# Patient Record
Sex: FEMALE | Race: WHITE | NOT HISPANIC OR LATINO | Employment: FULL TIME | URBAN - METROPOLITAN AREA
[De-identification: names, ages, dates, MRNs, and addresses within clinical notes are randomized per-mention and may not be internally consistent; named-entity substitution may affect disease eponyms.]

---

## 2023-09-28 DIAGNOSIS — M54.50 ACUTE BILATERAL LOW BACK PAIN, UNSPECIFIED WHETHER SCIATICA PRESENT: Primary | ICD-10-CM

## 2023-10-02 DIAGNOSIS — M54.50 ACUTE BILATERAL LOW BACK PAIN: Primary | ICD-10-CM

## 2023-10-04 PROBLEM — M25.552 LEFT HIP PAIN: Status: ACTIVE | Noted: 2019-10-09

## 2023-10-04 PROBLEM — I10 ESSENTIAL (PRIMARY) HYPERTENSION: Status: ACTIVE | Noted: 2018-07-30

## 2023-10-04 PROBLEM — S76.012A STRAIN OF LEFT HIP: Status: ACTIVE | Noted: 2019-10-09

## 2023-10-04 PROBLEM — N95.1 PERIMENOPAUSE: Status: ACTIVE | Noted: 2023-10-04

## 2023-10-04 PROBLEM — N91.4 SECONDARY OLIGOMENORRHEA: Status: ACTIVE | Noted: 2023-10-04

## 2023-10-04 PROBLEM — K21.9 GASTRO-ESOPHAGEAL REFLUX DISEASE WITHOUT ESOPHAGITIS: Status: ACTIVE | Noted: 2019-02-20

## 2023-10-04 PROBLEM — I20.9 CARDIAC ANGINA (CMS-HCC): Status: ACTIVE | Noted: 2023-10-04

## 2023-10-04 PROBLEM — E66.9 OBESITY: Status: ACTIVE | Noted: 2023-10-04

## 2023-10-04 PROBLEM — I71.9 AORTIC ANEURYSM (CMS-HCC): Status: ACTIVE | Noted: 2023-05-01

## 2023-10-04 PROBLEM — J40 BRONCHITIS: Status: ACTIVE | Noted: 2023-06-13

## 2023-10-04 PROBLEM — L23.7 POISON IVY DERMATITIS: Status: ACTIVE | Noted: 2021-08-18

## 2023-10-04 PROBLEM — N95.1 FEMALE CLIMACTERIC STATE: Status: ACTIVE | Noted: 2023-10-04

## 2023-10-04 PROBLEM — D64.9 ANEMIA: Status: ACTIVE | Noted: 2020-08-07

## 2023-10-04 PROBLEM — E78.00 PURE HYPERCHOLESTEROLEMIA: Status: ACTIVE | Noted: 2019-02-20

## 2023-10-04 PROBLEM — R93.1 ELEVATED CORONARY ARTERY CALCIUM SCORE: Status: ACTIVE | Noted: 2019-02-26

## 2023-10-04 RX ORDER — BENZONATATE 100 MG/1
100 CAPSULE ORAL 3 TIMES DAILY
COMMUNITY
Start: 2023-06-13 | End: 2023-11-13 | Stop reason: ALTCHOICE

## 2023-10-04 RX ORDER — ASPIRIN 81 MG/1
1 TABLET ORAL DAILY
COMMUNITY
Start: 2019-03-25 | End: 2023-11-13

## 2023-10-04 RX ORDER — OMEPRAZOLE 20 MG/1
20 CAPSULE, DELAYED RELEASE ORAL EVERY OTHER DAY
COMMUNITY
Start: 2019-03-25

## 2023-10-04 RX ORDER — LOSARTAN POTASSIUM 50 MG/1
50 TABLET ORAL DAILY
COMMUNITY
Start: 2023-05-16 | End: 2023-11-13 | Stop reason: ALTCHOICE

## 2023-10-04 RX ORDER — CHOLECALCIFEROL (VITAMIN D3) 50 MCG
1 TABLET ORAL DAILY
COMMUNITY
End: 2023-11-13

## 2023-10-04 RX ORDER — PENICILLIN V POTASSIUM 500 MG/1
TABLET, FILM COATED ORAL
COMMUNITY
Start: 2023-03-23

## 2023-10-04 RX ORDER — ALBUTEROL SULFATE 90 UG/1
2 AEROSOL, METERED RESPIRATORY (INHALATION) EVERY 4 HOURS PRN
COMMUNITY
Start: 2023-07-05 | End: 2024-05-13 | Stop reason: ALTCHOICE

## 2023-10-04 RX ORDER — LISINOPRIL 10 MG/1
10 TABLET ORAL DAILY
COMMUNITY
Start: 2019-03-25 | End: 2023-11-13 | Stop reason: ALTCHOICE

## 2023-10-04 RX ORDER — VALACYCLOVIR HYDROCHLORIDE 1 G/1
2 TABLET, FILM COATED ORAL DAILY PRN
COMMUNITY
End: 2024-04-16 | Stop reason: ALTCHOICE

## 2023-10-04 RX ORDER — DOXYCYCLINE HYCLATE 100 MG
100 TABLET ORAL DAILY
COMMUNITY
Start: 2023-06-19 | End: 2023-11-13 | Stop reason: ALTCHOICE

## 2023-10-04 RX ORDER — AMLODIPINE BESYLATE 5 MG/1
5 TABLET ORAL DAILY
COMMUNITY

## 2023-10-04 RX ORDER — EMOLLIENT COMBINATION NO.32
EMULSION, EXTENDED RELEASE TOPICAL
COMMUNITY
Start: 2023-08-15

## 2023-10-04 RX ORDER — IBUPROFEN 100 MG/5ML
1000 SUSPENSION, ORAL (FINAL DOSE FORM) ORAL DAILY
COMMUNITY

## 2023-10-04 RX ORDER — CHOLECALCIFEROL (VITAMIN D3) 125 MCG
CAPSULE ORAL
COMMUNITY
Start: 2019-03-25

## 2023-10-05 ENCOUNTER — TREATMENT (OUTPATIENT)
Dept: PHYSICAL THERAPY | Facility: CLINIC | Age: 58
End: 2023-10-05
Payer: COMMERCIAL

## 2023-10-05 DIAGNOSIS — M54.50 ACUTE BILATERAL LOW BACK PAIN, UNSPECIFIED WHETHER SCIATICA PRESENT: ICD-10-CM

## 2023-10-05 PROCEDURE — 97012 MECHANICAL TRACTION THERAPY: CPT | Mod: GP

## 2023-10-05 PROCEDURE — 97112 NEUROMUSCULAR REEDUCATION: CPT | Mod: GP

## 2023-10-05 PROCEDURE — 97014 ELECTRIC STIMULATION THERAPY: CPT | Mod: GP

## 2023-10-05 NOTE — PROGRESS NOTES
Physical Therapy Treatment    Patient Name: Tiki Gunter  MRN: [id number]  Today's Date: 10/5/2023  Time Calculation  Start Time: 1520  Stop Time: 1610  Time Calculation (min): 50 min    Current Problem   1. Acute bilateral low back pain, unspecified whether sciatica present  PT eval and treat          Subjective   General   General Comment: Cx last week due to illness. Was doing better until then, some symptoms still better but today having intense stiffness.  Pain   Pain Assessment:  (moderate stiffness lower LS region)    Objective   Findings:   R anterior innominate, FW bent with gait, L trunk lean, slow pete    Treatments:  Mechanical traction: Static: 85 lbs, for 12 minutes in Prone  Dry needling following informed consent: with e-stim; 5Hz, mA to tolerance, applied to R/L LPS and gluteals, for 10 minutes.    MET R anterior innominate, neutral pelvis achieved x8min    Assessment:   PT Assessment  Assessment Comment: More erect posture and fluid gait after tx    Plan:   OP PT Plan  Treatment/Interventions: Dry needling, Education/ Instruction, Electrical stimulation, Hot pack, Manual therapy, Mechanical traction, Neuromuscular re-education  PT Plan: Skilled PT  PT Frequency:  (1-2xwk)  Duration: 8visits  Number of Treatments Authorized: MN  Rehab Potential: Good  Plan of Care Agreement: Patient

## 2023-10-24 ENCOUNTER — TREATMENT (OUTPATIENT)
Dept: PHYSICAL THERAPY | Facility: CLINIC | Age: 58
End: 2023-10-24
Payer: COMMERCIAL

## 2023-10-24 DIAGNOSIS — M54.40 ACUTE BILATERAL LOW BACK PAIN WITH SCIATICA, SCIATICA LATERALITY UNSPECIFIED: Primary | ICD-10-CM

## 2023-10-24 PROCEDURE — 97112 NEUROMUSCULAR REEDUCATION: CPT | Mod: GP

## 2023-10-24 PROCEDURE — 97012 MECHANICAL TRACTION THERAPY: CPT | Mod: GP

## 2023-10-24 PROCEDURE — 97014 ELECTRIC STIMULATION THERAPY: CPT | Mod: GP

## 2023-10-24 NOTE — PROGRESS NOTES
Physical Therapy Treatment    Patient Name: Tiki Gunter  MRN: [id number]  Today's Date: 10/24/2023  Time Calculation  Start Time: 1140  Stop Time: 1240  Time Calculation (min): 60 min    Current Problem   1. Acute bilateral low back pain with sciatica, sciatica laterality unspecified              Subjective   General    Has been compliant with HEP but still in a lot of pain. Did have an hour drive to a wedding but was mostly seated, avoided dancing, and was not helping at this wedding like past one. Had exacerbation of pain that has since improved but still not finding relief last besides earlier in course of PT relief from burning has remained better. Feeling better after PT sessions is short-lived. Hard to tell if her back is causing her to move differently or her hip issues agitate her back. Has found that hooklying she can bear her hands into her thighs and get her back to pop. That gives relief. Still plans to look into inversion for home.  Pain    Severe recently, moderate today.    Objective   Neutral pelvis  Asymmetrical standing, FW bent    Treatments:  Mechanical traction: Static: 85 lbs, for 12 minutes in Prone  Dry needling following informed consent: with e-stim; 5Hz, mA to tolerance, applied to R/L LPS and gluteals, for 10 minutes.    Verbal review MET options, continues with alternating technique due to uncertainty with direction. Educated pt re potential benefit of change to aquatic PT.    Assessment:    Initial progress in relieving burning symptoms but more recently not retaining relief achieved at visits for other symptoms. Pt has f/u with primary in 2 wks. Anticipates referral to ortho or pain management.    Plan:    May benefit from aquatic PT. Will await physician follow up.

## 2023-10-30 ENCOUNTER — APPOINTMENT (OUTPATIENT)
Dept: PHYSICAL THERAPY | Facility: CLINIC | Age: 58
End: 2023-10-30
Payer: COMMERCIAL

## 2023-11-06 ENCOUNTER — APPOINTMENT (OUTPATIENT)
Dept: PHYSICAL THERAPY | Facility: CLINIC | Age: 58
End: 2023-11-06
Payer: COMMERCIAL

## 2023-11-09 ENCOUNTER — LAB (OUTPATIENT)
Dept: LAB | Facility: LAB | Age: 58
End: 2023-11-09
Payer: COMMERCIAL

## 2023-11-09 DIAGNOSIS — Z01.84 ENCOUNTER FOR ANTIBODY RESPONSE EXAMINATION: Primary | ICD-10-CM

## 2023-11-09 DIAGNOSIS — E78.00 PURE HYPERCHOLESTEROLEMIA, UNSPECIFIED: ICD-10-CM

## 2023-11-09 DIAGNOSIS — Z00.00 ENCOUNTER FOR GENERAL ADULT MEDICAL EXAMINATION WITHOUT ABNORMAL FINDINGS: ICD-10-CM

## 2023-11-09 DIAGNOSIS — I10 ESSENTIAL (PRIMARY) HYPERTENSION: ICD-10-CM

## 2023-11-09 LAB
ALBUMIN SERPL BCP-MCNC: 4.4 G/DL (ref 3.4–5)
ALP SERPL-CCNC: 81 U/L (ref 33–110)
ALT SERPL W P-5'-P-CCNC: 10 U/L (ref 7–45)
ANION GAP SERPL CALC-SCNC: 12 MMOL/L (ref 10–20)
APPEARANCE UR: ABNORMAL
AST SERPL W P-5'-P-CCNC: 18 U/L (ref 9–39)
BASOPHILS # BLD AUTO: 0.07 X10*3/UL (ref 0–0.1)
BASOPHILS NFR BLD AUTO: 1.5 %
BILIRUB SERPL-MCNC: 0.6 MG/DL (ref 0–1.2)
BILIRUB UR STRIP.AUTO-MCNC: NEGATIVE MG/DL
BUN SERPL-MCNC: 13 MG/DL (ref 6–23)
CALCIUM SERPL-MCNC: 9.7 MG/DL (ref 8.6–10.3)
CAOX CRY #/AREA UR COMP ASSIST: ABNORMAL /HPF
CHLORIDE SERPL-SCNC: 102 MMOL/L (ref 98–107)
CHOLEST SERPL-MCNC: 228 MG/DL (ref 0–199)
CHOLESTEROL/HDL RATIO: 3.6
CO2 SERPL-SCNC: 30 MMOL/L (ref 21–32)
COLOR UR: YELLOW
CREAT SERPL-MCNC: 0.73 MG/DL (ref 0.5–1.05)
EOSINOPHIL # BLD AUTO: 0.06 X10*3/UL (ref 0–0.7)
EOSINOPHIL NFR BLD AUTO: 1.3 %
ERYTHROCYTE [DISTWIDTH] IN BLOOD BY AUTOMATED COUNT: 15.8 % (ref 11.5–14.5)
GFR SERPL CREATININE-BSD FRML MDRD: >90 ML/MIN/1.73M*2
GLUCOSE SERPL-MCNC: 91 MG/DL (ref 74–99)
GLUCOSE UR STRIP.AUTO-MCNC: NEGATIVE MG/DL
HCT VFR BLD AUTO: 39.6 % (ref 36–46)
HDLC SERPL-MCNC: 63.1 MG/DL
HGB BLD-MCNC: 12.3 G/DL (ref 12–16)
HYALINE CASTS #/AREA URNS AUTO: ABNORMAL /LPF
IMM GRANULOCYTES # BLD AUTO: 0.01 X10*3/UL (ref 0–0.7)
IMM GRANULOCYTES NFR BLD AUTO: 0.2 % (ref 0–0.9)
KETONES UR STRIP.AUTO-MCNC: NEGATIVE MG/DL
LDLC SERPL CALC-MCNC: 142 MG/DL
LEUKOCYTE ESTERASE UR QL STRIP.AUTO: NEGATIVE
LYMPHOCYTES # BLD AUTO: 1.58 X10*3/UL (ref 1.2–4.8)
LYMPHOCYTES NFR BLD AUTO: 33.3 %
MCH RBC QN AUTO: 28 PG (ref 26–34)
MCHC RBC AUTO-ENTMCNC: 31.1 G/DL (ref 32–36)
MCV RBC AUTO: 90 FL (ref 80–100)
MONOCYTES # BLD AUTO: 0.38 X10*3/UL (ref 0.1–1)
MONOCYTES NFR BLD AUTO: 8 %
MUCOUS THREADS #/AREA URNS AUTO: ABNORMAL /LPF
NEUTROPHILS # BLD AUTO: 2.64 X10*3/UL (ref 1.2–7.7)
NEUTROPHILS NFR BLD AUTO: 55.7 %
NITRITE UR QL STRIP.AUTO: NEGATIVE
NON HDL CHOLESTEROL: 165 MG/DL (ref 0–149)
NRBC BLD-RTO: 0 /100 WBCS (ref 0–0)
PH UR STRIP.AUTO: 6 [PH]
PLATELET # BLD AUTO: 416 X10*3/UL (ref 150–450)
POTASSIUM SERPL-SCNC: 3.9 MMOL/L (ref 3.5–5.3)
PROT SERPL-MCNC: 7.1 G/DL (ref 6.4–8.2)
PROT UR STRIP.AUTO-MCNC: ABNORMAL MG/DL
RBC # BLD AUTO: 4.4 X10*6/UL (ref 4–5.2)
RBC # UR STRIP.AUTO: ABNORMAL /UL
RBC #/AREA URNS AUTO: ABNORMAL /HPF
SODIUM SERPL-SCNC: 140 MMOL/L (ref 136–145)
SP GR UR STRIP.AUTO: 1.02
SQUAMOUS #/AREA URNS AUTO: ABNORMAL /HPF
TRIGL SERPL-MCNC: 114 MG/DL (ref 0–149)
UROBILINOGEN UR STRIP.AUTO-MCNC: <2 MG/DL
VARICELLA ZOSTER IGG INDEX: <0.2 IA
VLDL: 23 MG/DL (ref 0–40)
VZV IGG SER QL IA: NEGATIVE
WBC # BLD AUTO: 4.7 X10*3/UL (ref 4.4–11.3)
WBC #/AREA URNS AUTO: ABNORMAL /HPF

## 2023-11-09 PROCEDURE — 86787 VARICELLA-ZOSTER ANTIBODY: CPT

## 2023-11-09 PROCEDURE — 85025 COMPLETE CBC W/AUTO DIFF WBC: CPT

## 2023-11-09 PROCEDURE — 81001 URINALYSIS AUTO W/SCOPE: CPT

## 2023-11-09 PROCEDURE — 80061 LIPID PANEL: CPT

## 2023-11-09 PROCEDURE — 80053 COMPREHEN METABOLIC PANEL: CPT

## 2023-11-09 PROCEDURE — 36415 COLL VENOUS BLD VENIPUNCTURE: CPT

## 2023-11-13 ENCOUNTER — OFFICE VISIT (OUTPATIENT)
Dept: PRIMARY CARE | Facility: CLINIC | Age: 58
End: 2023-11-13
Payer: COMMERCIAL

## 2023-11-13 VITALS
WEIGHT: 152 LBS | HEART RATE: 89 BPM | DIASTOLIC BLOOD PRESSURE: 78 MMHG | SYSTOLIC BLOOD PRESSURE: 124 MMHG | BODY MASS INDEX: 26.93 KG/M2 | RESPIRATION RATE: 16 BRPM | HEIGHT: 63 IN | OXYGEN SATURATION: 97 %

## 2023-11-13 DIAGNOSIS — I10 ESSENTIAL (PRIMARY) HYPERTENSION: Primary | ICD-10-CM

## 2023-11-13 DIAGNOSIS — E78.00 PURE HYPERCHOLESTEROLEMIA: ICD-10-CM

## 2023-11-13 DIAGNOSIS — Z00.00 WELL ADULT EXAM: ICD-10-CM

## 2023-11-13 DIAGNOSIS — K21.9 GASTRO-ESOPHAGEAL REFLUX DISEASE WITHOUT ESOPHAGITIS: ICD-10-CM

## 2023-11-13 PROBLEM — L23.7 POISON IVY DERMATITIS: Status: RESOLVED | Noted: 2021-08-18 | Resolved: 2023-11-13

## 2023-11-13 PROBLEM — J40 BRONCHITIS: Status: RESOLVED | Noted: 2023-06-13 | Resolved: 2023-11-13

## 2023-11-13 PROBLEM — M54.50 ACUTE BILATERAL LOW BACK PAIN: Status: RESOLVED | Noted: 2023-10-02 | Resolved: 2023-11-13

## 2023-11-13 PROBLEM — N91.4 SECONDARY OLIGOMENORRHEA: Status: RESOLVED | Noted: 2023-10-04 | Resolved: 2023-11-13

## 2023-11-13 PROBLEM — D64.9 ANEMIA: Status: RESOLVED | Noted: 2020-08-07 | Resolved: 2023-11-13

## 2023-11-13 PROBLEM — I20.9 CARDIAC ANGINA (CMS-HCC): Status: RESOLVED | Noted: 2023-10-04 | Resolved: 2023-11-13

## 2023-11-13 PROBLEM — M25.552 LEFT HIP PAIN: Status: RESOLVED | Noted: 2019-10-09 | Resolved: 2023-11-13

## 2023-11-13 PROBLEM — S76.012A STRAIN OF LEFT HIP: Status: RESOLVED | Noted: 2019-10-09 | Resolved: 2023-11-13

## 2023-11-13 PROCEDURE — 3074F SYST BP LT 130 MM HG: CPT | Performed by: FAMILY MEDICINE

## 2023-11-13 PROCEDURE — 99212 OFFICE O/P EST SF 10 MIN: CPT | Performed by: FAMILY MEDICINE

## 2023-11-13 PROCEDURE — 3078F DIAST BP <80 MM HG: CPT | Performed by: FAMILY MEDICINE

## 2023-11-13 PROCEDURE — 1036F TOBACCO NON-USER: CPT | Performed by: FAMILY MEDICINE

## 2023-11-13 PROCEDURE — 99396 PREV VISIT EST AGE 40-64: CPT | Performed by: FAMILY MEDICINE

## 2023-11-13 ASSESSMENT — PROMIS GLOBAL HEALTH SCALE
RATE_AVERAGE_PAIN: 6
RATE_SOCIAL_SATISFACTION: GOOD
CARRYOUT_SOCIAL_ACTIVITIES: GOOD
EMOTIONAL_PROBLEMS: RARELY
CARRYOUT_PHYSICAL_ACTIVITIES: COMPLETELY
RATE_QUALITY_OF_LIFE: GOOD
RATE_MENTAL_HEALTH: GOOD
RATE_PHYSICAL_HEALTH: GOOD
RATE_AVERAGE_FATIGUE: MILD
RATE_GENERAL_HEALTH: GOOD

## 2023-11-13 ASSESSMENT — ENCOUNTER SYMPTOMS
LOSS OF SENSATION IN FEET: 0
DEPRESSION: 0
OCCASIONAL FEELINGS OF UNSTEADINESS: 0

## 2023-11-13 ASSESSMENT — PATIENT HEALTH QUESTIONNAIRE - PHQ9
SUM OF ALL RESPONSES TO PHQ9 QUESTIONS 1 AND 2: 0
1. LITTLE INTEREST OR PLEASURE IN DOING THINGS: NOT AT ALL
2. FEELING DOWN, DEPRESSED OR HOPELESS: NOT AT ALL

## 2023-11-13 ASSESSMENT — PAIN SCALES - GENERAL: PAINLEVEL: 6

## 2023-11-13 NOTE — PROGRESS NOTES
Subjective   Patient ID: Tee Gunter is a 58 y.o. female who presents for Annual Exam.    HPI   1. TEE is seen for for her comprehensive physical exam. PMH, PSH, family history and social history were reviewed and updated. Colonoscopy in 5/19 by Dr. Poon showed internal hemorrhoids.  GYN history -.  Sees a Gynecologist.     2. TEE is here also for follow up of benign essential hypertension.  She is on amlodipine 5 daily.  This was switched from losartan which was causing fatigue.I found her home BP to be inaccurate today.  Repeat BP by me was 130/78.     3. TEE is seen today also for follow up of allergies.  She takes Zyrtec and Singulair on a PRN basis.     4. TEE is seen today also for follow up of High cholesterol. She is on no medication. She was on Zetia but this was stopped by cardiologist. She did not tolerate Crestor (rash) lovastatin and atorvastatin (leg cramps) in the past. Recent LDL is 142.     5. TEE is seen for also for for GERD.    She is on omeprazole QD. She had stopped it in 2020 but her symptoms returned so started back up again.  She states she still gets breakthrough heartburn intermittently.     6. She is also here for follow up of being over weight. She was on Phentermine and topiramate for 3 months in early 2015. She lost 10 lbs. but gained it all back since.      7.  She is also here for anemia.  Recent hemoglobin is normal.    In 2020 she had hemoglobin 10.1.  2 months later was repeated at 11.6.  She does donate blood about every 2 months.      8.    She is also here for degenerative joint disease.  She is status post left hip replacement in 2021. She has ongoing intermittent right and left hip pain and some back pain.  She is currently seeing physical therapy and she plans on going back to the orthopedic surgeon to check on her hip.  Review of Systems  Constitutional symptoms: No fever, loss of appetite, headaches, fatigue.  Eyes: Negative for vision loss or blurred or double  "vision.  ENT: Negative for hearing loss, tinnitus, nasal congestion, rhinorrhea, nosebleeds, teeth problems, mouth sores, sore throat or dysphagia.  Cardiovascular: Negative for chest pain/pressure, palpitations, edema, claudication.  Respiratory: Negative for shortness of breath, dyspnea on exertion, pain with breathing or coughing.  Breast: Negative for tenderness, masses, gynecomastia or discharge.  Gastrointestinal: Negative for anorexia, nausea, vomiting, indigestion, pain, change in bowel habits or blood in stool.  Musculoskeletal: Negative for joint pain, joint swelling, myalgias or cramps.  Skin: Negative for rash, changing skin lesions.  Neurological: Negative for headache, numbness, tingling, weakness or tremors.  Psychiatric: Negative for depression or anxiety.  Endocrine: Negative for marked change in weight, heat or cold intolerance, polyuria, polydipsia or polyphagia.  Hematologic: Negative for bruising or abnormal bleeding.    Objective   /78 (BP Location: Left arm, Patient Position: Sitting, BP Cuff Size: Large adult)   Pulse 89   Resp 16   Ht 1.6 m (5' 3\")   Wt 68.9 kg (152 lb)   SpO2 97%   BMI 26.93 kg/m²     Physical Exam  General Appearance: TEE is in no acute distress. She is overweight. The patient is awake and alert and appears stated age. TEE is cooperative with exam.  Head:   TEE's hair pattern is normal for patients age and The scalp is normal . The skull is normocephalic, atraumatic. The face is unremarkable with no facial droop.  Eyes: PERRLA, EOMI, no scleral icterus. Ophthalmoscopic exam of shows normal cup to disk ratio reveals normal light reflex.  Ears, Nose, Mouth, Throat: Both canals are normal.  Tympanic membranes are pearly gray, normal landmarks, good light reflex.   NOSE: Nasal mucosa is normal with no polyps, ulcers or lesions in Septum has no deviation. Tonsils are normal with no lesion. Uvula is normal. Posterior pharynx without lesions.  Neck: Inspection of the " neck reveals no enlarged nodes, no masses, no JVD, euthyroid and symmetric, trachea is midline, no mass or JVD . Palpation shows normal pulsation, no adenopathy, or mass.   Carotid auscultation reveals No murmur appreciated with No carotid bruit present.  Chest: Lungs are clear to auscultation and percussion, no respiratory distress.  Cardiovascular: Heart is regular in rhythm. No murmurs or gallops DP pulses are present. Extremities: No edema, clubbing or cyanosis.  Abdomen: Bowel sounds normal, non-tender no mass or organomegaly, no bruit. Auscultation: No tenderness, hepatosplenomegaly, or mass detection.. Light palpation reveals no tenderness, hepatosplenomegaly or mass detection..  Genitourinary: Genital/Rectal exam is deferred.  Musculoskeletal: Extremities: No deformity. No cyanosis, clubbing or edema. Right lower extremity is normal on inspection. Right hip has no deformity. Palpation reveals no tenderness and Hip range of motion is full ROM with mild pain .  Both feet have no evidence of lesions and nails are in good repair.  Neurological: Awake, alert and oriented X3. Cranial nerves II - XII are grossly intact  Assessment/Plan   Problem List Items Addressed This Visit             ICD-10-CM       High    Essential (primary) hypertension - Primary I10     Continue current medications.  Recheck in 6 months.           Gastro-esophageal reflux disease without esophagitis K21.9     Continue current medications.  Recheck in 6 months.           Pure hypercholesterolemia E78.00     Keep off medication due to side effects to them.  Encouraged her to follow a low-fat diet.  Increase exercise when she can after she hopefully gets improvement with her hip pain through physical therapy.         Well adult exam Z00.00     Anticipatory guidance given.

## 2023-11-13 NOTE — ASSESSMENT & PLAN NOTE
Keep off medication due to side effects to them.  Encouraged her to follow a low-fat diet.  Increase exercise when she can after she hopefully gets improvement with her hip pain through physical therapy.

## 2024-02-19 ENCOUNTER — DOCUMENTATION (OUTPATIENT)
Dept: PHYSICAL THERAPY | Facility: CLINIC | Age: 59
End: 2024-02-19
Payer: COMMERCIAL

## 2024-02-19 NOTE — PROGRESS NOTES
Physical Therapy    Discharge Summary    Name: Tiki Gunter  MRN: [id number]  : 1965  Date: 24    Discharge Summary: PT    Discharge Information: Date of discharge 2024, Date of last visit 10/24/2023, Date of evaluation 2023, Number of attended visits 12, Referred by El Tang MD, and Referred for LBP    Therapy Summary: Pt responded well to POC, but relief plateaued.     Discharge Status: Goals partly met, inconsistent relief.     Rehab Discharge Reason: Initial progress in relieving burning symptoms but more recently not retaining relief achieved at visits for other symptoms. Pt had upcoming f/u with primary, anticipated referral to ortho or pain management. Transfer to aquatic PT also considered.

## 2024-03-28 ENCOUNTER — HOSPITAL ENCOUNTER (OUTPATIENT)
Dept: RADIOLOGY | Facility: HOSPITAL | Age: 59
Discharge: HOME | End: 2024-03-28
Payer: COMMERCIAL

## 2024-03-28 DIAGNOSIS — M19.012 PRIMARY OSTEOARTHRITIS, LEFT SHOULDER: ICD-10-CM

## 2024-03-28 PROCEDURE — 73221 MRI JOINT UPR EXTREM W/O DYE: CPT | Mod: LEFT SIDE | Performed by: RADIOLOGY

## 2024-03-28 PROCEDURE — 73200 CT UPPER EXTREMITY W/O DYE: CPT | Mod: LT

## 2024-03-28 PROCEDURE — 73221 MRI JOINT UPR EXTREM W/O DYE: CPT | Mod: LT

## 2024-04-05 ENCOUNTER — PRE-ADMISSION TESTING (OUTPATIENT)
Dept: PREADMISSION TESTING | Facility: HOSPITAL | Age: 59
End: 2024-04-05
Payer: COMMERCIAL

## 2024-04-05 VITALS
DIASTOLIC BLOOD PRESSURE: 89 MMHG | SYSTOLIC BLOOD PRESSURE: 121 MMHG | WEIGHT: 153 LBS | OXYGEN SATURATION: 100 % | BODY MASS INDEX: 26.12 KG/M2 | RESPIRATION RATE: 16 BRPM | TEMPERATURE: 97.2 F | HEIGHT: 64 IN | HEART RATE: 98 BPM

## 2024-04-05 DIAGNOSIS — Z01.818 PRE-OP TESTING: Primary | ICD-10-CM

## 2024-04-05 LAB
ANION GAP SERPL CALC-SCNC: 10 MMOL/L
APPEARANCE UR: CLEAR
BASOPHILS # BLD AUTO: 0.1 X10*3/UL (ref 0–0.1)
BASOPHILS NFR BLD AUTO: 2 %
BILIRUB UR STRIP.AUTO-MCNC: NEGATIVE MG/DL
BUN SERPL-MCNC: 13 MG/DL (ref 8–25)
CALCIUM SERPL-MCNC: 9.9 MG/DL (ref 8.5–10.4)
CHLORIDE SERPL-SCNC: 98 MMOL/L (ref 97–107)
CO2 SERPL-SCNC: 31 MMOL/L (ref 24–31)
COLOR UR: YELLOW
CREAT SERPL-MCNC: 0.8 MG/DL (ref 0.4–1.6)
EGFRCR SERPLBLD CKD-EPI 2021: 87 ML/MIN/1.73M*2
EOSINOPHIL # BLD AUTO: 0.11 X10*3/UL (ref 0–0.7)
EOSINOPHIL NFR BLD AUTO: 2.2 %
ERYTHROCYTE [DISTWIDTH] IN BLOOD BY AUTOMATED COUNT: 15.9 % (ref 11.5–14.5)
GLUCOSE SERPL-MCNC: 101 MG/DL (ref 65–99)
GLUCOSE UR STRIP.AUTO-MCNC: NORMAL MG/DL
HCT VFR BLD AUTO: 36.4 % (ref 36–46)
HGB BLD-MCNC: 11.8 G/DL (ref 12–16)
HYALINE CASTS #/AREA URNS AUTO: ABNORMAL /LPF
IMM GRANULOCYTES # BLD AUTO: 0.02 X10*3/UL (ref 0–0.7)
IMM GRANULOCYTES NFR BLD AUTO: 0.4 % (ref 0–0.9)
KETONES UR STRIP.AUTO-MCNC: NEGATIVE MG/DL
LEUKOCYTE ESTERASE UR QL STRIP.AUTO: NEGATIVE
LYMPHOCYTES # BLD AUTO: 1.89 X10*3/UL (ref 1.2–4.8)
LYMPHOCYTES NFR BLD AUTO: 37.4 %
MCH RBC QN AUTO: 28.2 PG (ref 26–34)
MCHC RBC AUTO-ENTMCNC: 32.4 G/DL (ref 32–36)
MCV RBC AUTO: 87 FL (ref 80–100)
MONOCYTES # BLD AUTO: 0.45 X10*3/UL (ref 0.1–1)
MONOCYTES NFR BLD AUTO: 8.9 %
MUCOUS THREADS #/AREA URNS AUTO: ABNORMAL /LPF
NEUTROPHILS # BLD AUTO: 2.49 X10*3/UL (ref 1.2–7.7)
NEUTROPHILS NFR BLD AUTO: 49.1 %
NITRITE UR QL STRIP.AUTO: NEGATIVE
NRBC BLD-RTO: 0 /100 WBCS (ref 0–0)
PH UR STRIP.AUTO: 6.5 [PH]
PLATELET # BLD AUTO: 445 X10*3/UL (ref 150–450)
POTASSIUM SERPL-SCNC: 3.9 MMOL/L (ref 3.4–5.1)
PROT UR STRIP.AUTO-MCNC: ABNORMAL MG/DL
RBC # BLD AUTO: 4.19 X10*6/UL (ref 4–5.2)
RBC # UR STRIP.AUTO: ABNORMAL /UL
RBC #/AREA URNS AUTO: ABNORMAL /HPF
SODIUM SERPL-SCNC: 139 MMOL/L (ref 133–145)
SP GR UR STRIP.AUTO: 1.02
SQUAMOUS #/AREA URNS AUTO: ABNORMAL /HPF
UROBILINOGEN UR STRIP.AUTO-MCNC: NORMAL MG/DL
WBC # BLD AUTO: 5.1 X10*3/UL (ref 4.4–11.3)
WBC #/AREA URNS AUTO: ABNORMAL /HPF

## 2024-04-05 PROCEDURE — 85025 COMPLETE CBC W/AUTO DIFF WBC: CPT

## 2024-04-05 PROCEDURE — 36415 COLL VENOUS BLD VENIPUNCTURE: CPT

## 2024-04-05 PROCEDURE — 81001 URINALYSIS AUTO W/SCOPE: CPT

## 2024-04-05 PROCEDURE — 87081 CULTURE SCREEN ONLY: CPT | Mod: TRILAB,WESLAB

## 2024-04-05 PROCEDURE — 99204 OFFICE O/P NEW MOD 45 MIN: CPT | Performed by: PHYSICIAN ASSISTANT

## 2024-04-05 PROCEDURE — 80048 BASIC METABOLIC PNL TOTAL CA: CPT

## 2024-04-05 RX ORDER — CALCIUM CARBONATE 300MG(750)
400 TABLET,CHEWABLE ORAL DAILY
COMMUNITY

## 2024-04-05 RX ORDER — CHLORHEXIDINE GLUCONATE ORAL RINSE 1.2 MG/ML
SOLUTION DENTAL
Qty: 473 ML | Refills: 0 | Status: SHIPPED | OUTPATIENT
Start: 2024-04-18 | End: 2024-04-19

## 2024-04-05 ASSESSMENT — DUKE ACTIVITY SCORE INDEX (DASI)
CAN YOU DO YARD WORK LIKE RAKING LEAVES, WEEDING OR PUSHING A MOWER: YES
CAN YOU DO HEAVY WORK AROUND THE HOUSE LIKE SCRUBBING FLOORS OR LIFTING AND MOVING HEAVY FURNITURE: YES
CAN YOU HAVE SEXUAL RELATIONS: YES
CAN YOU PARTICIPATE IN STRENOUS SPORTS LIKE SWIMMING, SINGLES TENNIS, FOOTBALL, BASKETBALL, OR SKIING: NO
CAN YOU WALK A BLOCK OR TWO ON LEVEL GROUND: YES
DASI METS SCORE: 8
CAN YOU CLIMB A FLIGHT OF STAIRS OR WALK UP A HILL: YES
CAN YOU RUN A SHORT DISTANCE: NO
CAN YOU WALK INDOORS, SUCH AS AROUND YOUR HOUSE: YES
CAN YOU DO LIGHT WORK AROUND THE HOUSE LIKE DUSTING OR WASHING DISHES: YES
TOTAL_SCORE: 42.7
CAN YOU PARTICIPATE IN MODERATE RECREATIONAL ACTIVITIES LIKE GOLF, BOWLING, DANCING, DOUBLES TENNIS OR THROWING A BASEBALL OR FOOTBALL: YES
CAN YOU DO MODERATE WORK AROUND THE HOUSE LIKE VACUUMING, SWEEPING FLOORS OR CARRYING GROCERIES: YES
CAN YOU TAKE CARE OF YOURSELF (EAT, DRESS, BATHE, OR USE TOILET): YES

## 2024-04-05 ASSESSMENT — CHADS2 SCORE
CHF: NO
PRIOR STROKE OR TIA OR THROMBOEMBOLISM: NO
HYPERTENSION: YES
DIABETES: NO
CHADS2 SCORE: 1
AGE GREATER THAN OR EQUAL TO 75: NO

## 2024-04-05 ASSESSMENT — ENCOUNTER SYMPTOMS: ARTHRALGIAS: 1

## 2024-04-05 NOTE — CPM/PAT H&P
"CPM/PAT Evaluation       Name: Tiki Gunter (Tiki Gunter)  /Age: 1965/58 y.o.     In-Person       Chief Complaint: \"shoulder pain\"    HPI  The patient is a 58 year old female.  For the past 5 years she has experienced gradual, but progressive left shoulder ache with rest and shoulder pain with reaching, lifting and dressing.  The pain can radiate to the left upper arm.  She has associated left shoulder weakness and occasional numbness and tingling of the left arm, but no swelling.  She has had 3 cortisone injections in the past few years and the last injection in  was helpful for 6 weeks.  Surgical intervention is recommended at this time.    Past Medical History:   Diagnosis Date    Arthritis     Ascending aorta dilatation (CMS/HCC)     Delayed emergence from general anesthesia     GERD (gastroesophageal reflux disease)     Hyperlipidemia     Hypertension        Past Surgical History:   Procedure Laterality Date    BUNIONECTOMY Bilateral     COLONOSCOPY  2019    HIP ARTHROPLASTY Left     WA BREAST AUGMENTATION WITH IMPLANT      TONSILLECTOMY       Social History     Tobacco Use    Smoking status: Former     Packs/day: 0.25     Years: 1.00     Additional pack years: 0.00     Total pack years: 0.25     Types: Cigarettes     Quit date:      Years since quittin.2     Passive exposure: Never    Smokeless tobacco: Never   Substance Use Topics    Alcohol use: Yes     Alcohol/week: 2.0 standard drinks of alcohol     Types: 2 Glasses of wine per week     Social History     Substance and Sexual Activity   Drug Use Never     Allergies   Allergen Reactions    Rosuvastatin Rash and Myalgia    Statins-Hmg-Coa Reductase Inhibitors Rash and Myalgia     Current Outpatient Medications   Medication Sig Dispense Refill    albuterol 90 mcg/actuation inhaler Inhale 2 puffs every 4 hours if needed for wheezing.      amLODIPine (Norvasc) 5 mg tablet Take 1 tablet (5 mg) by mouth once daily.  " "    ascorbic acid (Vitamin C) 1,000 mg tablet Take 1 tablet (1,000 mg) by mouth once daily.      [START ON 4/18/2024] chlorhexidine (Peridex) 0.12 % solution Use as directed - patient may  prescription prior to 4/18/2024. Do not start before April 18, 2024. 473 mL 0    cholecalciferol (Vitamin D-3) 125 MCG (5000 UT) capsule Take by mouth.      EpiCeram lotion Apply topically. 2-3 times per day      magnesium oxide (Mag-Ox) 400 mg tablet Take 1 tablet (400 mg) by mouth once daily.      omeprazole (PriLOSEC) 20 mg DR capsule Take 1 capsule (20 mg) by mouth every other day.      penicillin v potassium (Veetid) 500 mg tablet TAKE 4 TABLETS BY MOUTH 1 HOUR PRIOR TO PROCEDURE, AND 2 TABLETS EVERY 6 HOURS POST PROCEDURE      valACYclovir (Valtrex) 1 gram tablet Take 2 tablets (2,000 mg) by mouth once daily as needed (COLD SORES).       No current facility-administered medications for this visit.     Review of Systems   Musculoskeletal:  Positive for arthralgias.   All other systems reviewed and are negative.    /89   Pulse 98   Temp 36.2 °C (97.2 °F) (Temporal)   Resp 16   Ht 1.626 m (5' 4\")   Wt 69.4 kg (153 lb)   SpO2 100%   BMI 26.26 kg/m²     Physical Exam  Vitals reviewed.   Constitutional:       Appearance: Normal appearance.   HENT:      Head: Normocephalic and atraumatic.      Mouth/Throat:      Mouth: Mucous membranes are moist.      Pharynx: Oropharynx is clear.   Eyes:      Extraocular Movements: Extraocular movements intact.      Pupils: Pupils are equal, round, and reactive to light.   Cardiovascular:      Rate and Rhythm: Normal rate and regular rhythm.      Heart sounds: Normal heart sounds.   Pulmonary:      Breath sounds: Normal breath sounds.   Abdominal:      General: Bowel sounds are normal.      Palpations: Abdomen is soft.   Musculoskeletal:      Comments: Tenderness with palpation and limited range of motion left shoulder.    Skin:     General: Skin is warm and dry. "   Neurological:      General: No focal deficit present.      Mental Status: She is alert and oriented to person, place, and time.   Psychiatric:         Mood and Affect: Mood normal.         Behavior: Behavior normal.        PAT AIRWAY:   Airway:     Mallampati::  II    TM distance::  >3 FB    Neck ROM::  Full   Teeth intact    ASA:  II  DASI RISK SCORE:  42.7  METS SCORE:  8  CHAD2 SCORE:  2.8%  REVISED CARDIAC RISK INDEX:  0.4%  STOP BANG SCORE:  4    Assessment and Plan:     Primary osteoarthritis of left shoulder:  Left reverse total shoulder arthroplasty  Essential hypertension - currently taking Norvasc  Dilated ascending thoracic aorta - 4.0 cm per 2017 CT - cardiac clearance by Marlen Sky CNP (Cardiology)  4/16/2024     Gayle Rosas PA-C

## 2024-04-05 NOTE — PREPROCEDURE INSTRUCTIONS
Why must I stop eating and drinking near surgery time?  With sedation, food or liquid in your stomach can enter your lungs causing serious complications  Increases nausea and vomiting    When do I need to stop eating and drinking before my surgery?   Do not eat or drink after midnight the night before your surgery/procedure.  You may have small sips of water to take your medication.    PAT DISCHARGE INSTRUCTIONS    Please call the Same Day Surgery (SDS) Department of the hospital where your procedure will be performed after 2:00 PM the day before your surgery. If you are scheduled on a Monday, or a Tuesday following a Monday holiday, you will need to call on the last business day prior to your surgery.    [place] [id number] [place] [id number] [phone]      Please let your surgeon know if:      You develop any open sores, shingles, burning or painful urination as these may increase your risk of an infection.   You no longer wish to have the surgery.   Any other personal circumstances change that may lead to the need to cancel or defer this surgery-such as being sick or getting admitted to any hospital within one week of your planned procedure.    Your contact details change, such as a change of address or phone number.    Starting now:     Please DO NOT drink alcohol or smoke for 24 hours before surgery. It is well known that quitting smoking can make a huge difference to your health and recovery from surgery. The longer you abstain from smoking, the better your chances of a healthy recovery. If you need help with quitting, call 3-478-QUIT-NOW to be connected to a trained counselor who will discuss the best methods to help you quit.     Before your surgery:    Please stop all supplements 7 days prior to surgery. Or as directed by your surgeon.   Please stop taking NSAID pain medicine such as Advil and Motrin 7 days before surgery.    If you develop any fever,  cough, cold, rashes, cuts, scratches, scrapes, urinary symptoms or infection anywhere on your body (including teeth and gums) prior to surgery, please call your surgeon’s office as soon as possible. This may require treatment to reduce the chance of cancellation on the day of surgery.    The day before your surgery:   DIET- Please follow the diet instructions at the top of your packet.   Get a good night’s rest.  Use the special soap for bathing if you have been instructed to use one.    Scheduled surgery times may change and you will be notified if this occurs - please check your personal voicemail for any updates.     On the morning of surgery:   Wear comfortable, loose fitting clothes which open in the front. Please do not wear moisturizers, creams, lotions, makeup or perfume.    Please bring with you to surgery:   Photo ID and insurance card   Current list of medicines and allergies   Pacemaker/ Defibrillator/Heart stent cards   CPAP machine and mask    Slings/ splints/ crutches   A copy of your complete advanced directive/DHPOA.    Please do NOT bring with you to surgery:   All jewelry and valuables should be left at home.   Prosthetic devices such as contact lenses, hearing aids, dentures, eyelash extensions, hairpins and body piercings must be removed prior to going in to the surgical suite.    After outpatient surgery:   A responsible adult MUST accompany you at the time of discharge and stay with you for 24 hours after your surgery. You may NOT drive yourself home after surgery.    Do not drive, operate machinery, make critical decisions or do activities that require co-ordination or balance until after a night’s sleep.   Do not drink alcoholic beverages for 24 hours.   Instructions for resuming your medications will be provided by your surgeon.    CALL YOUR DOCTOR AFTER SURGERY IF YOU HAVE:     Chills and/or a fever of 101° F or higher.    Redness, swelling, pus or drainage from your surgical wound or a  bad smell from the wound.    Lightheadedness, fainting or confusion.    Persistent vomiting (throwing up) and are not able to eat or drink for 12 hours.    Three or more loose, watery bowel movements in 24 hours (diarrhea).   Difficulty or pain while urinating( after non-urological surgery)    Pain and swelling in your legs, especially if it is only on one side.    Difficulty breathing or are breathing faster than normal.    Any new concerning symptoms.        Patient Information: Pre-Operative Infection Prevention Measures     Why did I have my nose, under my arms, and groin swabbed?  The purpose of the swab is to identify Staphylococcus aureus inside your nose or on your skin.  The swab was sent to the laboratory for culture.  A positive swab/culture for Staphylococcus aureus is called colonization or carriage.      What is Staphylococcus aureus?  Staphylococcus aureus, also known as “staph”, is a germ found on the skin or in the nose of healthy people.  Sometimes Staphylococcus aureus can get into the body and cause an infection.  This can be minor (such as pimples, boils, or other skin problems).  It might also be serious (such as a blood infection, pneumonia, or a surgical site infection).    What is Staphylococcus aureus colonization or carriage?  Colonization or carriage means that a person has the germ but is not sick from it.  These bacteria can be spread on the hands or when breathing or sneezing.    How is Staphylococcus aureus spread?  It is most often spread by close contact with a person or item that carries it.    What happens if my culture is positive for Staphylococcus aureus?  Your doctor/medical team will use this information to guide any antibiotic treatment which may be necessary.  Regardless of the culture results, we will clean the inside of your nose with a betadine swab just before you have your surgery.      Will I get an infection if I have Staphylococcus aureus in my nose or on my  skin?  Anyone can get an infection with Staphylococcus aureus.  However, the best way to reduce your risk of infection is to follow the instructions provided to you for the use of your CHG soap and dental rinse.        Patient Information: Oral/Dental Rinse    What is oral/dental rinse?   It is a mouthwash. It is a way of cleaning the mouth with a germ-killing solution before your surgery.  The solution contains chlorhexidine, commonly known as CHG.   It is used inside the mouth to kill a bacteria known as Staphylococcus aureus.  Let your doctor know if you are allergic to Chlorhexidine.    Why do I need to use CHG oral/dental rinse?  The CHG oral/dental rinse helps to kill a bacteria in your mouth known as Staphylococcus aureus.     This reduces the risk of infection at the surgical site.      Using your CHG oral/dental rinse  STEPS:  Use your CHG oral/dental rinse after you brush your teeth the night before (at bedtime) and the morning of your surgery.  Follow all directions on your prescription label.    Use the cap on the container to measure 15ml   Swish (gargle if you can) the mouthwash in your mouth for at least 30 seconds, (do not swallow) and spit out  After you use your CHG rinse, do not rinse your mouth with water, drink or eat.  Please refer to the prescription label for the appropriate time to resume oral intake      What side effects might I have using the CHG oral/dental rinse?  CHG rinse will stick to plaque on the teeth.  Brush and floss just before use.  Teeth brushing will help avoid staining of plaque during use.      Patient Information: Home Preoperative Antibacterial Shower      What is a home preoperative antibacterial shower?  This shower is a way of cleaning the skin with a germ-killing solution before surgery.  The solution contains chlorhexidine, commonly known as CHG.  CHG is a skin cleanser with germ-killing ability.  Let your doctor know if you are allergic to chlorhexidine.    Why do  I need to take a preoperative antibacterial shower?  Skin is not sterile.  It is best to try to make your skin as free of germs as possible before surgery.  Proper cleansing with a germ-killing soap before surgery can lower the number of germs on your skin.  This helps to reduce the risk of infection at the surgical site.  Following the instructions listed below will help you prepare your skin for surgery.      How do I use the solution?  Steps:  Begin using your CHG soap 5 days before your scheduled surgery on ________________________.    First, wash and rinse your hair using the CHG soap. Keep CHG soap away from ear canals and eyes.  Rinse completely, do not condition.  Hair extensions should be removed.  Wash your face with your normal soap and rinse.    Apply the CHG solution to a clean wet washcloth.  Turn the water off or move away from the water spray to avoid premature rinsing of the CHG soap as you are applying.   Firmly lather your entire body from the neck down.  Do not use on your face.  Pay special attention to the area(s) where your incision(s) will be located unless they are on your face.  Avoid scrubbing your skin too hard.  The important point is to have the CHG soap sit on your skin for 3 minutes.    When the 3 minutes are up, turn on the water and rinse the CHG solution off your body completely.   DO NOT wash with regular soap after you have used the CHG soap solution  Pat yourself dry with a clean, freshly-laundered towel.  DO NOT apply powders, deodorants, or lotions.  Dress in clean, freshly laundered nightclothes.    Be sure to sleep with clean, freshly laundered sheets.  Be aware that CHG will cause stains on fabrics; if you wash them with bleach after use.  Rinse your washcloth and other linens that have contact with CHG completely.  Use only non-chlorine detergents to launder the items used.   The morning of surgery is the fifth day.  Repeat the above steps and dress in clean comfortable  clothing     Whom should I contact if I have any questions regarding the use of CHG soap?  Call the [facility] at [phone] if you have any questions.              Medication List            Accurate as of April 5, 2024 10:10 AM. Always use your most recent med list.                albuterol 90 mcg/actuation inhaler  Notes to patient: Take as needed.     amLODIPine 5 mg tablet  Commonly known as: Norvasc  Medication Adjustments for Surgery: Take morning of surgery with sip of water, no other fluids     chlorhexidine 0.12 % solution  Commonly known as: Peridex  Use as directed - patient may  prescription prior to 4/18/2024. Do not start before April 18, 2024.  Start taking on: April 18, 2024     cholecalciferol 125 MCG (5000 UT) capsule  Commonly known as: Vitamin D-3  Medication Adjustments for Surgery: Stop 7 days before surgery     EpiCeram lotion  Generic drug: emollient combination no.32  Notes to patient: Take as needed.     magnesium oxide 400 mg tablet  Commonly known as: Mag-Ox  Medication Adjustments for Surgery: Stop 7 days before surgery     omeprazole 20 mg DR capsule  Commonly known as: PriLOSEC  Medication Adjustments for Surgery: Take morning of surgery with sip of water, no other fluids     penicillin v potassium 500 mg tablet  Commonly known as: Veetid  Notes to patient: Take as needed.     valACYclovir 1 gram tablet  Commonly known as: Valtrex  Notes to patient: Take as needed.     Vitamin C 1,000 mg tablet  Generic drug: ascorbic acid  Medication Adjustments for Surgery: Stop 7 days before surgery

## 2024-04-05 NOTE — NURSING NOTE
Patient seen in PAT today. States had ECG in February with [facility] in Dr. Citlalli Prater office. Called office and they ar no longer with office. Transferred to medical records. Message left.

## 2024-04-07 LAB — STAPHYLOCOCCUS SPEC CULT: NORMAL

## 2024-04-16 ENCOUNTER — OFFICE VISIT (OUTPATIENT)
Dept: CARDIOLOGY | Facility: HOSPITAL | Age: 59
End: 2024-04-16
Payer: COMMERCIAL

## 2024-04-16 VITALS
DIASTOLIC BLOOD PRESSURE: 81 MMHG | SYSTOLIC BLOOD PRESSURE: 122 MMHG | HEART RATE: 92 BPM | OXYGEN SATURATION: 98 % | WEIGHT: 154.98 LBS | BODY MASS INDEX: 26.6 KG/M2

## 2024-04-16 DIAGNOSIS — Z01.818 PRE-OPERATIVE CLEARANCE: ICD-10-CM

## 2024-04-16 DIAGNOSIS — I77.810 ASCENDING AORTA DILATION (CMS-HCC): Primary | ICD-10-CM

## 2024-04-16 PROCEDURE — 99204 OFFICE O/P NEW MOD 45 MIN: CPT | Performed by: NURSE PRACTITIONER

## 2024-04-16 PROCEDURE — 93010 ELECTROCARDIOGRAM REPORT: CPT | Performed by: INTERNAL MEDICINE

## 2024-04-16 PROCEDURE — 3074F SYST BP LT 130 MM HG: CPT | Performed by: NURSE PRACTITIONER

## 2024-04-16 PROCEDURE — 3079F DIAST BP 80-89 MM HG: CPT | Performed by: NURSE PRACTITIONER

## 2024-04-16 PROCEDURE — 99214 OFFICE O/P EST MOD 30 MIN: CPT | Performed by: NURSE PRACTITIONER

## 2024-04-16 PROCEDURE — 1036F TOBACCO NON-USER: CPT | Performed by: NURSE PRACTITIONER

## 2024-04-16 PROCEDURE — 93005 ELECTROCARDIOGRAM TRACING: CPT | Performed by: NURSE PRACTITIONER

## 2024-04-16 RX ORDER — EZETIMIBE 10 MG/1
10 TABLET ORAL DAILY
Qty: 90 TABLET | Refills: 3 | Status: SHIPPED | OUTPATIENT
Start: 2024-04-16 | End: 2025-04-16

## 2024-04-16 NOTE — H&P (VIEW-ONLY)
Referred by Gayle KAMARA for Pre-operative cardiac evaluation prior to non-cardiac surgery      History Of Present Illness:    Tiki Gunter is a 58 y.o. female with h/o hypertension, dyslipidemia (allergic to statins-- hives/rash and leg pain), elevated CCS (229.34 11/6/2017, Dilated AscAo 4cm (CT for CCS 11/2017), GERD, osteoarthritis presenting today to the [facility] for pre-operative cardiac evaluation prior to upcoming shoulder surgery as well as surveillance of dilated ascending aorta.  Tiki denies having symptoms of chest pain or pressure, SOB, dizziness.  She is able to ambulate up a flight of stairs without anginal equivalent symptoms.        Past Medical History:  She has a past medical history of Arthritis, Ascending aorta dilatation (CMS-HCC), Delayed emergence from general anesthesia, GERD (gastroesophageal reflux disease), Hyperlipidemia, and Hypertension.    Past Surgical History:  She has a past surgical history that includes Colonoscopy (2019); pr breast augmentation with implant (2006); Tonsillectomy; Hip Arthroplasty (Left, 2021); and Bunionectomy (Bilateral).      Social History:  She reports that she quit smoking about 27 years ago. Her smoking use included cigarettes. She started smoking about 28 years ago. She has a 0.3 pack-year smoking history. She has never been exposed to tobacco smoke. She has never used smokeless tobacco. She reports current alcohol use of about 2.0 standard drinks of alcohol per week. She reports that she does not use drugs.    Family History:  No family history on file.     Allergies:  Rosuvastatin and Statins-hmg-coa reductase inhibitors    Outpatient Medications:  Current Outpatient Medications   Medication Instructions    albuterol 90 mcg/actuation inhaler 2 puffs, inhalation, Every 4 hours PRN    amLODIPine (NORVASC) 5 mg, oral, Daily    ascorbic acid (VITAMIN C) 1,000 mg, oral, Daily    [START ON 4/18/2024] chlorhexidine (Peridex)  0.12 % solution Use as directed - patient may  prescription prior to 4/18/2024.    cholecalciferol (Vitamin D-3) 125 MCG (5000 UT) capsule oral    EpiCeram lotion Topical, 2-3 times per day    ezetimibe (ZETIA) 10 mg, oral, Daily    magnesium oxide (MAG-OX) 400 mg, oral, Daily    omeprazole (PRILOSEC) 20 mg, oral, Every other day    penicillin v potassium (Veetid) 500 mg tablet TAKE 4 TABLETS BY MOUTH 1 HOUR PRIOR TO PROCEDURE, AND 2 TABLETS EVERY 6 HOURS POST PROCEDURE        Last Recorded Vitals:  Vitals:    04/16/24 1303   BP: 122/81   Pulse: 92   SpO2: 98%   Weight: 70.3 kg (154 lb 15.7 oz)       Physical Exam:  Constitutional: Pleasant, Awake/Alert/Oriented to person place and time. No distress  Head: Atraumatic, Normocephalic  Eyes: EOMI. DAMION  Neck:  No JVD  Cardiovascular: Regular rate and rhythm, S1, S2. No extra heart sounds or murmurs  Respiratory: Clear to auscultation bilaterally. No wheezing, rales or rhonchi. Good chest wall expansion  Abdomen: Soft, Nontender. Bowel sounds appreciated  Musculoskeletal: ROM intact. Muscle strength grossly intact upper and lower extremities 5/5.   Neurological: CNII-XII intact. Sensation grossly intact  Extremities: Warm and dry. No acute rashes and lesions  Psychiatric: Appropriate mood and affect         Last Labs:  CBC -  Lab Results   Component Value Date    WBC 5.1 04/05/2024    HGB 11.8 (L) 04/05/2024    HCT 36.4 04/05/2024    MCV 87 04/05/2024     04/05/2024       CMP -  Lab Results   Component Value Date    CALCIUM 9.9 04/05/2024    PROT 7.1 11/09/2023    ALBUMIN 4.4 11/09/2023    AST 18 11/09/2023    ALT 10 11/09/2023    ALKPHOS 81 11/09/2023    BILITOT 0.6 11/09/2023       LIPID PANEL -   Lab Results   Component Value Date    CHOL 228 (H) 11/09/2023    TRIG 114 11/09/2023    HDL 63.1 11/09/2023    CHHDL 3.6 11/09/2023    VLDL 23 11/09/2023    NHDL 165 (H) 11/09/2023       RENAL FUNCTION PANEL -   Lab Results   Component Value Date    GLUCOSE 101  "(H) 2024     2024    K 3.9 2024    CL 98 2024    CO2 31 2024    ANIONGAP 10 2024    ANIONGAP 12 2023    BUN 13 2024    CREATININE 0.80 2024    CALCIUM 9.9 2024    ALBUMIN 4.4 2023        No results found for: \"BNP\", \"HGBA1C\"    Last Cardiology Tests:  ECG:  NSR, HR 88bpm     Stress Test:  2019  IMPRESSION:  1. Normal stress myocardial perfusion imaging without evidence of  stress induced ischemia or prior myocardial infarction.  2. Well-maintained left ventricular function.  LV ejection fraction  of 65%.  3. LV size within normal limits.    Cardiac Imagin2017 CCS  FINDINGS:  The score and distribution of calcium in the coronary arteries is as  follows:     LM 0  .87  LCx 8.37  RCA 45.1     Total   229.34     The visualized mid/lower ascending thoracic aorta measures 4 cm in  diameter. The heart is normal in size. No pericardial effusion.     No gross mediastinal or hilar adenopathy. The visualized lungs are  grossly clear. Bilateral breast implants are noted.     The visualized upper abdomen is grossly unremarkable.    Lab review: I have personally reviewed the laboratory result(s)   Diagnostic review: I have personally reviewed the result(s) of the EKG, stress test, and CCS     Assessment/Plan   Very pleasant 58 y.o. female with h/o hypertension, dyslipidemia (allergic to statins-- hives/rash and leg pain), elevated CCS (229.34 2017, Dilated AscAo 4cm (CT for CCS 2017), GERD, osteoarthritis presenting today to the [facility] for pre-operative cardiac evaluation prior to upcoming shoulder surgery as well as surveillance of dilated ascending aorta.    Plan:  May proceed to the OR with mild risk for perioperative cardiovascular event.     Recommend obtaining an echocardiogram after recovery from shoulder surgery for surveillance of dilated AscAo.  Will call with results to discuss/arrange " further follow up pending results.     Continue amlodipine 5mg daily and restart zetia 10mg daily       Marlen Sky, APRN-CNP

## 2024-04-16 NOTE — PROGRESS NOTES
Referred by Gayle KAMARA for Pre-operative cardiac evaluation prior to non-cardiac surgery      History Of Present Illness:    Tiki Gunter is a 58 y.o. female with h/o hypertension, dyslipidemia (allergic to statins-- hives/rash and leg pain), elevated CCS (229.34 11/6/2017, Dilated AscAo 4cm (CT for CCS 11/2017), GERD, osteoarthritis presenting today to the [facility] for pre-operative cardiac evaluation prior to upcoming shoulder surgery as well as surveillance of dilated ascending aorta.  Tiki denies having symptoms of chest pain or pressure, SOB, dizziness.  She is able to ambulate up a flight of stairs without anginal equivalent symptoms.        Past Medical History:  She has a past medical history of Arthritis, Ascending aorta dilatation (CMS-HCC), Delayed emergence from general anesthesia, GERD (gastroesophageal reflux disease), Hyperlipidemia, and Hypertension.    Past Surgical History:  She has a past surgical history that includes Colonoscopy (2019); pr breast augmentation with implant (2006); Tonsillectomy; Hip Arthroplasty (Left, 2021); and Bunionectomy (Bilateral).      Social History:  She reports that she quit smoking about 27 years ago. Her smoking use included cigarettes. She started smoking about 28 years ago. She has a 0.3 pack-year smoking history. She has never been exposed to tobacco smoke. She has never used smokeless tobacco. She reports current alcohol use of about 2.0 standard drinks of alcohol per week. She reports that she does not use drugs.    Family History:  No family history on file.     Allergies:  Rosuvastatin and Statins-hmg-coa reductase inhibitors    Outpatient Medications:  Current Outpatient Medications   Medication Instructions    albuterol 90 mcg/actuation inhaler 2 puffs, inhalation, Every 4 hours PRN    amLODIPine (NORVASC) 5 mg, oral, Daily    ascorbic acid (VITAMIN C) 1,000 mg, oral, Daily    [START ON 4/18/2024] chlorhexidine (Peridex)  0.12 % solution Use as directed - patient may  prescription prior to 4/18/2024.    cholecalciferol (Vitamin D-3) 125 MCG (5000 UT) capsule oral    EpiCeram lotion Topical, 2-3 times per day    ezetimibe (ZETIA) 10 mg, oral, Daily    magnesium oxide (MAG-OX) 400 mg, oral, Daily    omeprazole (PRILOSEC) 20 mg, oral, Every other day    penicillin v potassium (Veetid) 500 mg tablet TAKE 4 TABLETS BY MOUTH 1 HOUR PRIOR TO PROCEDURE, AND 2 TABLETS EVERY 6 HOURS POST PROCEDURE        Last Recorded Vitals:  Vitals:    04/16/24 1303   BP: 122/81   Pulse: 92   SpO2: 98%   Weight: 70.3 kg (154 lb 15.7 oz)       Physical Exam:  Constitutional: Pleasant, Awake/Alert/Oriented to person place and time. No distress  Head: Atraumatic, Normocephalic  Eyes: EOMI. DAMION  Neck:  No JVD  Cardiovascular: Regular rate and rhythm, S1, S2. No extra heart sounds or murmurs  Respiratory: Clear to auscultation bilaterally. No wheezing, rales or rhonchi. Good chest wall expansion  Abdomen: Soft, Nontender. Bowel sounds appreciated  Musculoskeletal: ROM intact. Muscle strength grossly intact upper and lower extremities 5/5.   Neurological: CNII-XII intact. Sensation grossly intact  Extremities: Warm and dry. No acute rashes and lesions  Psychiatric: Appropriate mood and affect         Last Labs:  CBC -  Lab Results   Component Value Date    WBC 5.1 04/05/2024    HGB 11.8 (L) 04/05/2024    HCT 36.4 04/05/2024    MCV 87 04/05/2024     04/05/2024       CMP -  Lab Results   Component Value Date    CALCIUM 9.9 04/05/2024    PROT 7.1 11/09/2023    ALBUMIN 4.4 11/09/2023    AST 18 11/09/2023    ALT 10 11/09/2023    ALKPHOS 81 11/09/2023    BILITOT 0.6 11/09/2023       LIPID PANEL -   Lab Results   Component Value Date    CHOL 228 (H) 11/09/2023    TRIG 114 11/09/2023    HDL 63.1 11/09/2023    CHHDL 3.6 11/09/2023    VLDL 23 11/09/2023    NHDL 165 (H) 11/09/2023       RENAL FUNCTION PANEL -   Lab Results   Component Value Date    GLUCOSE 101  "(H) 2024     2024    K 3.9 2024    CL 98 2024    CO2 31 2024    ANIONGAP 10 2024    ANIONGAP 12 2023    BUN 13 2024    CREATININE 0.80 2024    CALCIUM 9.9 2024    ALBUMIN 4.4 2023        No results found for: \"BNP\", \"HGBA1C\"    Last Cardiology Tests:  ECG:  NSR, HR 88bpm     Stress Test:  2019  IMPRESSION:  1. Normal stress myocardial perfusion imaging without evidence of  stress induced ischemia or prior myocardial infarction.  2. Well-maintained left ventricular function.  LV ejection fraction  of 65%.  3. LV size within normal limits.    Cardiac Imagin2017 CCS  FINDINGS:  The score and distribution of calcium in the coronary arteries is as  follows:     LM 0  .87  LCx 8.37  RCA 45.1     Total   229.34     The visualized mid/lower ascending thoracic aorta measures 4 cm in  diameter. The heart is normal in size. No pericardial effusion.     No gross mediastinal or hilar adenopathy. The visualized lungs are  grossly clear. Bilateral breast implants are noted.     The visualized upper abdomen is grossly unremarkable.    Lab review: I have personally reviewed the laboratory result(s)   Diagnostic review: I have personally reviewed the result(s) of the EKG, stress test, and CCS     Assessment/Plan   Very pleasant 58 y.o. female with h/o hypertension, dyslipidemia (allergic to statins-- hives/rash and leg pain), elevated CCS (229.34 2017, Dilated AscAo 4cm (CT for CCS 2017), GERD, osteoarthritis presenting today to the [facility] for pre-operative cardiac evaluation prior to upcoming shoulder surgery as well as surveillance of dilated ascending aorta.    Plan:  May proceed to the OR with mild risk for perioperative cardiovascular event.     Recommend obtaining an echocardiogram after recovery from shoulder surgery for surveillance of dilated AscAo.  Will call with results to discuss/arrange " further follow up pending results.     Continue amlodipine 5mg daily and restart zetia 10mg daily       Marlen Sky, APRN-CNP

## 2024-04-17 LAB
ATRIAL RATE: 88 BPM
P AXIS: 30 DEGREES
P OFFSET: 199 MS
P ONSET: 151 MS
PR INTERVAL: 140 MS
Q ONSET: 221 MS
QRS COUNT: 15 BEATS
QRS DURATION: 86 MS
QT INTERVAL: 364 MS
QTC CALCULATION(BAZETT): 440 MS
QTC FREDERICIA: 413 MS
R AXIS: 11 DEGREES
T AXIS: 8 DEGREES
T OFFSET: 403 MS
VENTRICULAR RATE: 88 BPM

## 2024-04-19 ENCOUNTER — APPOINTMENT (OUTPATIENT)
Dept: RADIOLOGY | Facility: HOSPITAL | Age: 59
End: 2024-04-19
Payer: COMMERCIAL

## 2024-04-19 ENCOUNTER — HOSPITAL ENCOUNTER (OUTPATIENT)
Facility: HOSPITAL | Age: 59
Setting detail: OUTPATIENT SURGERY
Discharge: HOME | End: 2024-04-19
Attending: ORTHOPAEDIC SURGERY | Admitting: ORTHOPAEDIC SURGERY
Payer: COMMERCIAL

## 2024-04-19 ENCOUNTER — ANESTHESIA (OUTPATIENT)
Dept: OPERATING ROOM | Facility: HOSPITAL | Age: 59
End: 2024-04-19
Payer: COMMERCIAL

## 2024-04-19 ENCOUNTER — ANESTHESIA EVENT (OUTPATIENT)
Dept: OPERATING ROOM | Facility: HOSPITAL | Age: 59
End: 2024-04-19
Payer: COMMERCIAL

## 2024-04-19 ENCOUNTER — PHARMACY VISIT (OUTPATIENT)
Dept: PHARMACY | Facility: CLINIC | Age: 59
End: 2024-04-19
Payer: MEDICARE

## 2024-04-19 VITALS
SYSTOLIC BLOOD PRESSURE: 132 MMHG | HEIGHT: 64 IN | OXYGEN SATURATION: 98 % | BODY MASS INDEX: 26.29 KG/M2 | TEMPERATURE: 97.2 F | WEIGHT: 154 LBS | HEART RATE: 93 BPM | DIASTOLIC BLOOD PRESSURE: 88 MMHG | RESPIRATION RATE: 16 BRPM

## 2024-04-19 DIAGNOSIS — M19.012 PRIMARY OSTEOARTHRITIS OF LEFT SHOULDER: ICD-10-CM

## 2024-04-19 DIAGNOSIS — M19.019 SHOULDER ARTHRITIS: Primary | ICD-10-CM

## 2024-04-19 PROCEDURE — 7100000002 HC RECOVERY ROOM TIME - EACH INCREMENTAL 1 MINUTE: Performed by: ORTHOPAEDIC SURGERY

## 2024-04-19 PROCEDURE — A23472 PR RECONSTR TOTAL SHOULDER IMPLANT: Performed by: ANESTHESIOLOGIST ASSISTANT

## 2024-04-19 PROCEDURE — 2500000004 HC RX 250 GENERAL PHARMACY W/ HCPCS (ALT 636 FOR OP/ED): Performed by: ORTHOPAEDIC SURGERY

## 2024-04-19 PROCEDURE — 2780000003 HC OR 278 NO HCPCS: Performed by: ORTHOPAEDIC SURGERY

## 2024-04-19 PROCEDURE — 64415 NJX AA&/STRD BRCH PLXS IMG: CPT | Performed by: ANESTHESIOLOGY

## 2024-04-19 PROCEDURE — 2720000007 HC OR 272 NO HCPCS: Performed by: ORTHOPAEDIC SURGERY

## 2024-04-19 PROCEDURE — C1713 ANCHOR/SCREW BN/BN,TIS/BN: HCPCS | Performed by: ORTHOPAEDIC SURGERY

## 2024-04-19 PROCEDURE — A23472 PR RECONSTR TOTAL SHOULDER IMPLANT: Performed by: ANESTHESIOLOGY

## 2024-04-19 PROCEDURE — 7100000010 HC PHASE TWO TIME - EACH INCREMENTAL 1 MINUTE: Performed by: ORTHOPAEDIC SURGERY

## 2024-04-19 PROCEDURE — 23472 RECONSTRUCT SHOULDER JOINT: CPT

## 2024-04-19 PROCEDURE — 73030 X-RAY EXAM OF SHOULDER: CPT | Mod: LT

## 2024-04-19 PROCEDURE — C1776 JOINT DEVICE (IMPLANTABLE): HCPCS | Performed by: ORTHOPAEDIC SURGERY

## 2024-04-19 PROCEDURE — 3700000001 HC GENERAL ANESTHESIA TIME - INITIAL BASE CHARGE: Performed by: ORTHOPAEDIC SURGERY

## 2024-04-19 PROCEDURE — 2500000001 HC RX 250 WO HCPCS SELF ADMINISTERED DRUGS (ALT 637 FOR MEDICARE OP): Performed by: ANESTHESIOLOGY

## 2024-04-19 PROCEDURE — 3600000005 HC OR TIME - INITIAL BASE CHARGE - PROCEDURE LEVEL FIVE: Performed by: ORTHOPAEDIC SURGERY

## 2024-04-19 PROCEDURE — 2500000004 HC RX 250 GENERAL PHARMACY W/ HCPCS (ALT 636 FOR OP/ED): Performed by: ANESTHESIOLOGY

## 2024-04-19 PROCEDURE — 7100000001 HC RECOVERY ROOM TIME - INITIAL BASE CHARGE: Performed by: ORTHOPAEDIC SURGERY

## 2024-04-19 PROCEDURE — 73030 X-RAY EXAM OF SHOULDER: CPT | Mod: LEFT SIDE | Performed by: RADIOLOGY

## 2024-04-19 PROCEDURE — 2500000001 HC RX 250 WO HCPCS SELF ADMINISTERED DRUGS (ALT 637 FOR MEDICARE OP): Performed by: ORTHOPAEDIC SURGERY

## 2024-04-19 PROCEDURE — RXMED WILLOW AMBULATORY MEDICATION CHARGE

## 2024-04-19 PROCEDURE — 2500000004 HC RX 250 GENERAL PHARMACY W/ HCPCS (ALT 636 FOR OP/ED): Mod: JZ | Performed by: ORTHOPAEDIC SURGERY

## 2024-04-19 PROCEDURE — 7100000009 HC PHASE TWO TIME - INITIAL BASE CHARGE: Performed by: ORTHOPAEDIC SURGERY

## 2024-04-19 PROCEDURE — 3600000010 HC OR TIME - EACH INCREMENTAL 1 MINUTE - PROCEDURE LEVEL FIVE: Performed by: ORTHOPAEDIC SURGERY

## 2024-04-19 PROCEDURE — 2500000005 HC RX 250 GENERAL PHARMACY W/O HCPCS: Performed by: ORTHOPAEDIC SURGERY

## 2024-04-19 PROCEDURE — 2500000005 HC RX 250 GENERAL PHARMACY W/O HCPCS: Performed by: ANESTHESIOLOGIST ASSISTANT

## 2024-04-19 PROCEDURE — 3700000002 HC GENERAL ANESTHESIA TIME - EACH INCREMENTAL 1 MINUTE: Performed by: ORTHOPAEDIC SURGERY

## 2024-04-19 PROCEDURE — 2500000004 HC RX 250 GENERAL PHARMACY W/ HCPCS (ALT 636 FOR OP/ED): Performed by: ANESTHESIOLOGIST ASSISTANT

## 2024-04-19 DEVICE — IMPLANTABLE DEVICE: Type: IMPLANTABLE DEVICE | Site: SHOULDER | Status: FUNCTIONAL

## 2024-04-19 DEVICE — DYNANITE VIP GLENOID PIN, NITINOL, 2.8MM
Type: IMPLANTABLE DEVICE | Site: SHOULDER | Status: FUNCTIONAL
Brand: ARTHREX®

## 2024-04-19 DEVICE — FULL DOSE BONE CEMENT, 10 PACK CATALOG NUMBER IS 6191-1-010
Type: IMPLANTABLE DEVICE | Site: SHOULDER | Status: FUNCTIONAL
Brand: SIMPLEX

## 2024-04-19 RX ORDER — FAMOTIDINE 20 MG/1
20 TABLET, FILM COATED ORAL ONCE
Status: COMPLETED | OUTPATIENT
Start: 2024-04-19 | End: 2024-04-19

## 2024-04-19 RX ORDER — OXYCODONE AND ACETAMINOPHEN 5; 325 MG/1; MG/1
1 TABLET ORAL EVERY 6 HOURS PRN
Qty: 25 TABLET | Refills: 0 | Status: SHIPPED | OUTPATIENT
Start: 2024-04-19 | End: 2024-04-26

## 2024-04-19 RX ORDER — TRANEXAMIC ACID 100 MG/ML
INJECTION, SOLUTION INTRAVENOUS AS NEEDED
Status: DISCONTINUED | OUTPATIENT
Start: 2024-04-19 | End: 2024-04-19 | Stop reason: HOSPADM

## 2024-04-19 RX ORDER — ROCURONIUM BROMIDE 10 MG/ML
INJECTION, SOLUTION INTRAVENOUS AS NEEDED
Status: DISCONTINUED | OUTPATIENT
Start: 2024-04-19 | End: 2024-04-19

## 2024-04-19 RX ORDER — FENTANYL CITRATE 50 UG/ML
INJECTION, SOLUTION INTRAMUSCULAR; INTRAVENOUS AS NEEDED
Status: DISCONTINUED | OUTPATIENT
Start: 2024-04-19 | End: 2024-04-19

## 2024-04-19 RX ORDER — ALBUTEROL SULFATE 0.83 MG/ML
2.5 SOLUTION RESPIRATORY (INHALATION) ONCE
Status: DISCONTINUED | OUTPATIENT
Start: 2024-04-19 | End: 2024-04-19 | Stop reason: HOSPADM

## 2024-04-19 RX ORDER — ONDANSETRON HYDROCHLORIDE 2 MG/ML
INJECTION, SOLUTION INTRAVENOUS AS NEEDED
Status: DISCONTINUED | OUTPATIENT
Start: 2024-04-19 | End: 2024-04-19

## 2024-04-19 RX ORDER — ACETAMINOPHEN 500 MG
500 TABLET ORAL ONCE
Status: COMPLETED | OUTPATIENT
Start: 2024-04-19 | End: 2024-04-19

## 2024-04-19 RX ORDER — IBUPROFEN 200 MG
600 TABLET ORAL EVERY 6 HOURS
COMMUNITY

## 2024-04-19 RX ORDER — DIPHENHYDRAMINE HYDROCHLORIDE 50 MG/ML
12.5 INJECTION INTRAMUSCULAR; INTRAVENOUS ONCE AS NEEDED
Status: DISCONTINUED | OUTPATIENT
Start: 2024-04-19 | End: 2024-04-19 | Stop reason: HOSPADM

## 2024-04-19 RX ORDER — CELECOXIB 200 MG/1
400 CAPSULE ORAL ONCE
Status: COMPLETED | OUTPATIENT
Start: 2024-04-19 | End: 2024-04-19

## 2024-04-19 RX ORDER — MIDAZOLAM HYDROCHLORIDE 1 MG/ML
2 INJECTION, SOLUTION INTRAMUSCULAR; INTRAVENOUS ONCE
Status: COMPLETED | OUTPATIENT
Start: 2024-04-19 | End: 2024-04-19

## 2024-04-19 RX ORDER — MEPERIDINE HYDROCHLORIDE 25 MG/ML
12.5 INJECTION INTRAMUSCULAR; INTRAVENOUS; SUBCUTANEOUS EVERY 10 MIN PRN
Status: DISCONTINUED | OUTPATIENT
Start: 2024-04-19 | End: 2024-04-19 | Stop reason: HOSPADM

## 2024-04-19 RX ORDER — KETOROLAC TROMETHAMINE 30 MG/ML
15 INJECTION, SOLUTION INTRAMUSCULAR; INTRAVENOUS ONCE AS NEEDED
Status: DISCONTINUED | OUTPATIENT
Start: 2024-04-19 | End: 2024-04-19 | Stop reason: HOSPADM

## 2024-04-19 RX ORDER — LABETALOL HYDROCHLORIDE 5 MG/ML
10 INJECTION, SOLUTION INTRAVENOUS ONCE AS NEEDED
Status: DISCONTINUED | OUTPATIENT
Start: 2024-04-19 | End: 2024-04-19 | Stop reason: HOSPADM

## 2024-04-19 RX ORDER — NORETHINDRONE AND ETHINYL ESTRADIOL 0.5-0.035
KIT ORAL AS NEEDED
Status: DISCONTINUED | OUTPATIENT
Start: 2024-04-19 | End: 2024-04-19

## 2024-04-19 RX ORDER — FENTANYL CITRATE 50 UG/ML
50 INJECTION, SOLUTION INTRAMUSCULAR; INTRAVENOUS ONCE
Status: COMPLETED | OUTPATIENT
Start: 2024-04-19 | End: 2024-04-19

## 2024-04-19 RX ORDER — PREGABALIN 75 MG/1
75 CAPSULE ORAL ONCE
Status: COMPLETED | OUTPATIENT
Start: 2024-04-19 | End: 2024-04-19

## 2024-04-19 RX ORDER — HYDRALAZINE HYDROCHLORIDE 20 MG/ML
10 INJECTION INTRAMUSCULAR; INTRAVENOUS EVERY 30 MIN PRN
Status: DISCONTINUED | OUTPATIENT
Start: 2024-04-19 | End: 2024-04-19 | Stop reason: HOSPADM

## 2024-04-19 RX ORDER — CEFAZOLIN SODIUM 2 G/100ML
2 INJECTION, SOLUTION INTRAVENOUS ONCE
Status: COMPLETED | OUTPATIENT
Start: 2024-04-19 | End: 2024-04-19

## 2024-04-19 RX ORDER — LIDOCAINE HYDROCHLORIDE 10 MG/ML
INJECTION INFILTRATION; PERINEURAL AS NEEDED
Status: DISCONTINUED | OUTPATIENT
Start: 2024-04-19 | End: 2024-04-19

## 2024-04-19 RX ORDER — ONDANSETRON HYDROCHLORIDE 2 MG/ML
4 INJECTION, SOLUTION INTRAVENOUS ONCE AS NEEDED
Status: DISCONTINUED | OUTPATIENT
Start: 2024-04-19 | End: 2024-04-19 | Stop reason: HOSPADM

## 2024-04-19 RX ORDER — VANCOMYCIN HYDROCHLORIDE 1 G/20ML
INJECTION, POWDER, LYOPHILIZED, FOR SOLUTION INTRAVENOUS AS NEEDED
Status: DISCONTINUED | OUTPATIENT
Start: 2024-04-19 | End: 2024-04-19 | Stop reason: HOSPADM

## 2024-04-19 RX ORDER — PROPOFOL 10 MG/ML
INJECTION, EMULSION INTRAVENOUS AS NEEDED
Status: DISCONTINUED | OUTPATIENT
Start: 2024-04-19 | End: 2024-04-19

## 2024-04-19 RX ORDER — ACETAMINOPHEN 500 MG
1000 TABLET ORAL EVERY 6 HOURS PRN
COMMUNITY

## 2024-04-19 RX ORDER — METOCLOPRAMIDE 10 MG/1
10 TABLET ORAL ONCE
Status: COMPLETED | OUTPATIENT
Start: 2024-04-19 | End: 2024-04-19

## 2024-04-19 RX ORDER — ALBUTEROL SULFATE 0.83 MG/ML
2.5 SOLUTION RESPIRATORY (INHALATION) ONCE AS NEEDED
Status: DISCONTINUED | OUTPATIENT
Start: 2024-04-19 | End: 2024-04-19 | Stop reason: HOSPADM

## 2024-04-19 RX ORDER — PHENYLEPHRINE HYDROCHLORIDE 10 MG/ML
INJECTION INTRAVENOUS AS NEEDED
Status: DISCONTINUED | OUTPATIENT
Start: 2024-04-19 | End: 2024-04-19

## 2024-04-19 RX ORDER — TRANEXAMIC ACID 100 MG/ML
INJECTION, SOLUTION INTRAVENOUS AS NEEDED
Status: DISCONTINUED | OUTPATIENT
Start: 2024-04-19 | End: 2024-04-19

## 2024-04-19 RX ADMIN — FENTANYL CITRATE 50 MCG: 50 INJECTION INTRAMUSCULAR; INTRAVENOUS at 11:01

## 2024-04-19 RX ADMIN — PHENYLEPHRINE HYDROCHLORIDE 100 MCG: 10 INJECTION INTRAVENOUS at 12:18

## 2024-04-19 RX ADMIN — MIDAZOLAM HYDROCHLORIDE 2 MG: 1 INJECTION, SOLUTION INTRAMUSCULAR; INTRAVENOUS at 11:01

## 2024-04-19 RX ADMIN — PHENYLEPHRINE HYDROCHLORIDE 100 MCG: 10 INJECTION INTRAVENOUS at 12:14

## 2024-04-19 RX ADMIN — ONDANSETRON HYDROCHLORIDE 4 MG: 2 INJECTION INTRAMUSCULAR; INTRAVENOUS at 11:56

## 2024-04-19 RX ADMIN — EPHEDRINE SULFATE 7.5 MG: 50 INJECTION, SOLUTION INTRAVENOUS at 12:26

## 2024-04-19 RX ADMIN — EPHEDRINE SULFATE 10 MG: 50 INJECTION, SOLUTION INTRAVENOUS at 13:19

## 2024-04-19 RX ADMIN — METOCLOPRAMIDE 10 MG: 10 TABLET ORAL at 09:49

## 2024-04-19 RX ADMIN — SUGAMMADEX 200 MG: 100 INJECTION, SOLUTION INTRAVENOUS at 14:22

## 2024-04-19 RX ADMIN — EPHEDRINE SULFATE 2.5 MG: 50 INJECTION, SOLUTION INTRAVENOUS at 12:32

## 2024-04-19 RX ADMIN — SODIUM CHLORIDE, SODIUM LACTATE, POTASSIUM CHLORIDE, AND CALCIUM CHLORIDE: 600; 310; 30; 20 INJECTION, SOLUTION INTRAVENOUS at 11:52

## 2024-04-19 RX ADMIN — PROPOFOL 160 MG: 10 INJECTION, EMULSION INTRAVENOUS at 11:56

## 2024-04-19 RX ADMIN — PHENYLEPHRINE HYDROCHLORIDE 100 MCG: 10 INJECTION INTRAVENOUS at 12:11

## 2024-04-19 RX ADMIN — EPHEDRINE SULFATE 10 MG: 50 INJECTION, SOLUTION INTRAVENOUS at 12:46

## 2024-04-19 RX ADMIN — EPHEDRINE SULFATE 10 MG: 50 INJECTION, SOLUTION INTRAVENOUS at 12:38

## 2024-04-19 RX ADMIN — ROCURONIUM BROMIDE 50 MG: 10 INJECTION, SOLUTION INTRAVENOUS at 11:56

## 2024-04-19 RX ADMIN — POVIDONE-IODINE 1 APPLICATION: 5 SOLUTION TOPICAL at 09:56

## 2024-04-19 RX ADMIN — PHENYLEPHRINE HYDROCHLORIDE 100 MCG: 10 INJECTION INTRAVENOUS at 12:16

## 2024-04-19 RX ADMIN — CEFAZOLIN SODIUM 2 G: 2 INJECTION, SOLUTION INTRAVENOUS at 11:56

## 2024-04-19 RX ADMIN — EPHEDRINE SULFATE 10 MG: 50 INJECTION, SOLUTION INTRAVENOUS at 13:45

## 2024-04-19 RX ADMIN — SODIUM CHLORIDE, SODIUM LACTATE, POTASSIUM CHLORIDE, AND CALCIUM CHLORIDE: 600; 310; 30; 20 INJECTION, SOLUTION INTRAVENOUS at 12:55

## 2024-04-19 RX ADMIN — DEXAMETHASONE SODIUM PHOSPHATE 8 MG: 4 INJECTION, SOLUTION INTRAMUSCULAR; INTRAVENOUS at 11:56

## 2024-04-19 RX ADMIN — TRANEXAMIC ACID 1000 MG: 100 INJECTION, SOLUTION INTRAVENOUS at 12:02

## 2024-04-19 RX ADMIN — PREGABALIN 75 MG: 75 CAPSULE ORAL at 09:50

## 2024-04-19 RX ADMIN — FAMOTIDINE 20 MG: 20 TABLET ORAL at 09:49

## 2024-04-19 RX ADMIN — LIDOCAINE HYDROCHLORIDE 50 MG: 10 INJECTION, SOLUTION INFILTRATION; PERINEURAL at 11:56

## 2024-04-19 RX ADMIN — ROCURONIUM BROMIDE 10 MG: 10 INJECTION, SOLUTION INTRAVENOUS at 13:32

## 2024-04-19 RX ADMIN — ROCURONIUM BROMIDE 10 MG: 10 INJECTION, SOLUTION INTRAVENOUS at 12:59

## 2024-04-19 RX ADMIN — ACETAMINOPHEN 500 MG: 500 TABLET ORAL at 09:45

## 2024-04-19 RX ADMIN — FENTANYL CITRATE 100 MCG: 0.05 INJECTION, SOLUTION INTRAMUSCULAR; INTRAVENOUS at 11:56

## 2024-04-19 RX ADMIN — CELECOXIB 400 MG: 200 CAPSULE ORAL at 09:47

## 2024-04-19 ASSESSMENT — PAIN - FUNCTIONAL ASSESSMENT
PAIN_FUNCTIONAL_ASSESSMENT: 0-10

## 2024-04-19 ASSESSMENT — PAIN SCALES - GENERAL
PAIN_LEVEL: 0
PAINLEVEL_OUTOF10: 0 - NO PAIN
PAINLEVEL_OUTOF10: 5 - MODERATE PAIN
PAINLEVEL_OUTOF10: 0 - NO PAIN
PAINLEVEL_OUTOF10: 0 - NO PAIN
PAINLEVEL_OUTOF10: 5 - MODERATE PAIN
PAINLEVEL_OUTOF10: 0 - NO PAIN
PAINLEVEL_OUTOF10: 0 - NO PAIN

## 2024-04-19 ASSESSMENT — PAIN DESCRIPTION - DESCRIPTORS
DESCRIPTORS: NUMBNESS
DESCRIPTORS: NUMBNESS
DESCRIPTORS: ACHING;DULL

## 2024-04-19 ASSESSMENT — COLUMBIA-SUICIDE SEVERITY RATING SCALE - C-SSRS
6. HAVE YOU EVER DONE ANYTHING, STARTED TO DO ANYTHING, OR PREPARED TO DO ANYTHING TO END YOUR LIFE?: NO
2. HAVE YOU ACTUALLY HAD ANY THOUGHTS OF KILLING YOURSELF?: NO
1. IN THE PAST MONTH, HAVE YOU WISHED YOU WERE DEAD OR WISHED YOU COULD GO TO SLEEP AND NOT WAKE UP?: NO

## 2024-04-19 ASSESSMENT — PAIN DESCRIPTION - LOCATION: LOCATION: SHOULDER

## 2024-04-19 ASSESSMENT — PAIN DESCRIPTION - ORIENTATION: ORIENTATION: LEFT

## 2024-04-19 NOTE — OP NOTE
left anatomic shoulder (L) Operative Note     Date: 2024  OR Location: [facility]    Name: Tiki Gunter, : 1965, Age: 58 y.o., MRN: [id number], Sex: female    Diagnosis  Pre-op Diagnosis     * Primary osteoarthritis of left shoulder [M19.012] Post-op Diagnosis     * Primary osteoarthritis of left shoulder [M19.012]     Procedures  left anatomic shoulder  85008 - [place] ARTHROPLASTY GLENOHUMERAL JOINT TOTAL SHOULDER      Surgeons      * Walt Reece - Primary    Resident/Fellow/Other Assistant:  Surgeons and Role:  * No surgeons found with a matching role *    Procedure Summary  Anesthesia: General  ASA: III  Anesthesia Staff: Anesthesiologist: Roscoe Hardy DO  C-AA: FRANK Yuen  Estimated Blood Loss: 100 cc mL  Intra-op Medications:   Administrations occurring from 1045 to 1345 on 24:   Medication Name Total Dose   vancomycin (Vancocin) vial for injection 1 g   tranexamic acid (Cyklokapron) injection 1 g   ceFAZolin in dextrose (iso-os) (Ancef) IVPB 2 g 2 g   fentaNYL PF (Sublimaze) injection 50 mcg 50 mcg   midazolam (Versed) injection 2 mg 2 mg              Anesthesia Record               Intraprocedure I/O Totals          Intake    LR bolus 1500.00 mL    Tranexamic Acid 0.00 mL    The total shown is the total volume documented since Anesthesia Start was filed.    ceFAZolin in dextrose (iso-os) (Ancef) IVPB 2 g 100.00 mL    Total Intake 1600 mL       Output    Urine 0 mL    Est. Blood Loss 150 mL    Total Output 150 mL       Net    Net Volume 1450 mL          Specimen: No specimens collected     Staff:   Circulator: Justus Chang RN  Scrub Person: Jose Roblero         Drains and/or Catheters: * None in log *    Tourniquet Times:         Implants:  Implants       Type Name Action Serial No.      Joint GLENOID PIN, TARGETER, 2.8MM, STAINLESS - [id number] Implanted      Implant CEMENT, BONE, SIMPLEX P, RADIOPAQUE, FULL DOSE, 40 GM - [id number] Implanted       SIZE 15 ARTHREX  VAULTLOCK AUGMENTED GLENOID, LEFT, MEDIUM Implanted [id number]     Joint SCREW, ECLIPSE CAGE, SMALL, 30MM - [id number] Implanted      Joint ECLIPSE TRUNNION, SLOTTED, TPS CAP, 43MM - [id number] Implanted       SIZE 43/18 ARTHREX ECLIPSE HUMERAL HEAD Implanted               Findings: Same    Indications: Tiki Gunter is an 58 y.o. female who is having surgery for LEFT SHOULDER ARTHRITIS.  Plan is to proceed with left anatomic total shoulder replacement.    The patient was seen in the preoperative area. The risks, benefits, complications, treatment options, non-operative alternatives, expected recovery and outcomes were discussed with the patient. The possibilities of reaction to medication, pulmonary aspiration, injury to surrounding structures, bleeding, recurrent infection, the need for additional procedures, failure to diagnose a condition, and creating a complication requiring transfusion or operation were discussed with the patient. The patient concurred with the proposed plan, giving informed consent.  The site of surgery was properly noted/marked if necessary per policy. The patient has been actively warmed in preoperative area. Preoperative antibiotics have been ordered and given within 1 hours of incision. Venous thrombosis prophylaxis are not indicated.    Procedure Details:   Patient was taken to room and administered a preoperative block and general anesthesia.  She was then prepped and draped in the usual sterile fashion in the beachchair position.  Bony landmarks were identified and marked.  Standard deltopectoral incision was made starting the coracoid process extending over the upper arm.  Subcu tissues were dissected with the Bovie.  Flaps were developed.  Cephalic vein was carefully dissected laterally with the deltoid.  Small branches were cauterized as needed.  The deltoid was mobilized on the humeral head.  Brown retractor was inserted.  The clavipectoral fascia was incised.  The axillary nerve was  identified and protected throughout the case.  Subscapularis peel was performed.  Stay sutures were applied into the edge of the subscapularis.  The bicipital groove was opened.  The biceps was traced proximally through the rotator interval which was released.  The upper biceps was then tenodesed to the upper end of the pectoralis tendon with a #2 FiberWire suture.  Remainder the biceps was tenotomized and removed.  The humeral head was delivered into the field.  Inferior capsule was released.  Anatomic head cut was performed.  Peripheral osteophytes were removed.  The central portion was then sized to a size 43 trunnion.  This was the sizer was impacted down.  A  was then used to ream the upper end of the humerus.  Chest piece was inserted.  The cage screw was sized to a small.  Endplate was inserted.  Attention was directed to the glenoid.  The subscapularis was released superior medial and inferior with my finger over the axillary nerve.  Biceps stump and peripheral labral tissue was removed.  The inferior capsule was released with my finger over the axillary nerve.  Findings demonstrated significant retroversion of the glenoid of approximate 20 degrees which is seen on preoperative planning.  There was no B2 glenoid present.  And there was minimal humeral subluxation on preoperative planning.  Therefore based on patient's young age decision was made to proceed with a augmented glenoid.  This was sized to a 15 based on preoperative planning.  Next using a 15 degree guide the guidewire was inserted.  The native glenoid was reamed first.  Next using a 15 degree guide the oblique reaming was performed.  A trial was used to make sure that the surfaces were smooth and flat and conform well.  At that point central hole was drilled the drill guide was then inserted and superior inferior holes were drilled.  The inferior holes were impacted centrally and peripherally.  A trial medium sized glenoid was inserted  and appeared to fit well.  At that point the medium sized glenoid with a 15 degree augment was prepared with bone from the humeral head around the central post.  The glenoid was irrigated and a dry sponge was inserted.  Cement was then mixed to doughy consistency.  It was impacted and superior and inferior holes 3 times.  Additional cement was placed into the vault.  The glenoid was impacted down.  Attention was directed towards the humerus.  The chess piece was inserted.  Anchors were placed into the medial rim of the glenoid.  Superior and inferior anchors were placed.  Drill holes were placed in the greater tuberosity and bicipital groove for anchor placement.  Next several sizes were trialed for the humeral head.  Felt that a size 43 x 18 was appropriate size and allowed for 40 degrees external, 50 degrees of posterior translation with good pushback, and 60 degrees internal rotation.  The trial was removed the implant was prepared was impacted down the shoulder was reduced attention was directed subscapularis repair.  I fiber loop suture was used to pass the 6 sutures through the superior and inferior portion of the subscapularis.  A figure-of-eight suture was placed in the rotator interval and tied down.  A ripstop suture was then connected and passed through the subscap.  There was tension down through it was snug.  Attention was directed towards anchor placement.  1 limb from each of the fiber tape sutures were placed through a 3.5 corkscrew anchor.  They were first placed in the greater tuberosity with excellent purchase.  There is remaining 2 limbs were placed in the humeral shaft.  Excellent repair was obtained.  Extensive rogation performed.  Vancomycin powder was inserted.  Deltopectoral closure was performed with a 0.0 Vicryl.  Subcuticular was 3.0 Vicryl.  Skin was closed with 4 Monocryl subcuticular sutures.  Wound closure system was applied.  A patient placed in a sterile silver dressing and a DonJoy  eligio.                Complications:  None; patient tolerated the procedure well.    Disposition: PACU - hemodynamically stable.  Condition: stable         Additional Details: None    Attending Attestation: I was present and scrubbed for the entire procedure.    Walt Reece  Phone Number: [phone]

## 2024-04-19 NOTE — POST-PROCEDURE NOTE
Patient sat at the bedside, denies dizziness. Assisted her with getting dressed. Patient ambulated to the restroom and voided without difficulty.

## 2024-04-19 NOTE — ANESTHESIA PROCEDURE NOTES
Airway  Date/Time: 4/19/2024 11:59 AM  Urgency: elective    Airway not difficult    Staffing  Performed: FRANK   Authorized by: Roscoe Hardy DO    Performed by: FRANK Yuen  Patient location during procedure: OR    Indications and Patient Condition  Indications for airway management: airway protection and anesthesia  Spontaneous ventilation: present  Sedation level: deep  Preoxygenated: yes  Patient position: sniffing  MILS maintained throughout  Mask difficulty assessment: 1 - vent by mask    Final Airway Details  Final airway type: endotracheal airway      Successful airway: ETT  Cuffed: yes   Successful intubation technique: direct laryngoscopy  Facilitating devices/methods: intubating stylet  Endotracheal tube insertion site: oral  Blade: Laura  Blade size: #3  ETT size (mm): 7.0  Cormack-Lehane Classification: grade I - full view of glottis  Placement verified by: chest auscultation and capnometry   Measured from: lips  ETT to lips (cm): 21  Number of attempts at approach: 1  Number of other approaches attempted: 0    Additional Comments  Easy grade I view. Teeth and lips in pre-op condition. Soft bite block placed.

## 2024-04-19 NOTE — DISCHARGE INSTRUCTIONS
Left arm sling.  Keep dressing clean and dry.  Ice to left shoulder as needed.  Okay to move left elbow wrist and hand.  No motion left shoulder.  Patient should have PT appointment in 1 week.  Patient to follow-up with me in 10 days.

## 2024-04-19 NOTE — ANESTHESIA PROCEDURE NOTES
Peripheral Block    Patient location during procedure: pre-op  Start time: 4/19/2024 11:08 AM  End time: 4/19/2024 11:10 AM  Reason for block: at surgeon's request and post-op pain management  Staffing  Performed: attending   Authorized by: Roscoe Hardy DO    Performed by: Roscoe Hardy DO  Preanesthetic Checklist  Completed: patient identified, IV checked, site marked, risks and benefits discussed, surgical consent, monitors and equipment checked, pre-op evaluation and timeout performed   Timeout performed at: 4/19/2024 11:05 AM  Peripheral Block  Patient position: laying flat  Prep: ChloraPrep  Patient monitoring: heart rate, cardiac monitor and continuous pulse ox  Block type: brachial plexus  Laterality: left  Injection technique: single-shot  Guidance: nerve stimulator and ultrasound guided  Local infiltration: bupivicaine  Infiltration strength: 0.5 %  Dose: 22 mL  Needle  Needle type: short-bevel   Needle gauge: 20 G  Needle length: 8 cm  Needle localization: anatomical landmarks, nerve stimulator and ultrasound guidance  Needle insertion depth: 5 cm  Assessment  Injection assessment: no paresthesia on injection, incremental injection, local visualized surrounding nerve on ultrasound and negative aspiration for heme  Paresthesia pain: none  Heart rate change: no  Slow fractionated injection: no

## 2024-04-19 NOTE — POST-PROCEDURE NOTE
Patient pulled from PACU, report from TERI Pelaez. Patient awake, dressing to left shoulder dry and intact. Patient denies pain, able to wiggle fingers but feels numbness. Fingers pink and warm, good cap refill. Head to toe assessment is unchanged from previously. Patient has an ice pack on shoulder and sling in place. Patient give cookies and ginger ale, family called to room.    1540- Discharge instructions reviewed with patient and her sister, both verbalize understanding. RX to go here with scripts.

## 2024-04-19 NOTE — ANESTHESIA PREPROCEDURE EVALUATION
Patient: Tiki Gunter    Procedure Information       Date/Time: 04/19/24 1045    Procedure: Arthroplasty Reverse Shoulder (Left: Shoulder) - ARTHREX VIP SYSTEM - ECLIPCE, AND APEX AND REVERSE    [facility] / [facility]    Surgeons: Walt Reece MD            Relevant Problems   Cardiac   (+) Aortic aneurysm (CMS-HCC)   (+) Essential (primary) hypertension   (+) Pure hypercholesterolemia      GI   (+) Gastro-esophageal reflux disease without esophagitis      Endocrine   (+) Obesity       Clinical information reviewed:                   NPO Detail:  No data recorded     Physical Exam    Airway  Mallampati: II     Cardiovascular   Rhythm: regular  Rate: normal     Dental - normal exam     Pulmonary   Breath sounds clear to auscultation     Abdominal   Abdomen: soft             Anesthesia Plan    History of general anesthesia?: yes  History of complications of general anesthesia?: no    ASA 3     general     intravenous induction   Postoperative administration of opioids is intended.  Anesthetic plan and risks discussed with patient.    Plan discussed with CRNA, attending and CAA.

## 2024-04-19 NOTE — ANESTHESIA POSTPROCEDURE EVALUATION
Patient: Tiki Gunter    Procedure Summary       Date: 04/19/24 Room / Location: [facility]    Anesthesia Start: 1152 Anesthesia Stop: 1439    Procedure: left anatomic shoulder (Left: Shoulder) Diagnosis:       Primary osteoarthritis of left shoulder      (LEFT SHOULDER ARTHRITIS)    Surgeons: Walt Reece MD Responsible Provider: Roscoe Hardy DO    Anesthesia Type: general ASA Status: 3            Anesthesia Type: general    Vitals Value Taken Time   /98 04/19/24 1445   Temp nf 04/19/24 1446   Pulse 97 04/19/24 1446   Resp 19 04/19/24 1446   SpO2 100 % 04/19/24 1446   Vitals shown include unfiled device data.    Anesthesia Post Evaluation    Patient location during evaluation: bedside  Patient participation: complete - patient participated  Level of consciousness: awake  Pain score: 0  Pain management: adequate  Airway patency: patent  Two or more strategies used to mitigate risk of obstructive sleep apnea  Cardiovascular status: acceptable  Respiratory status: acceptable  Hydration status: acceptable  Postoperative Nausea and Vomiting: none        No notable events documented.

## 2024-05-13 ENCOUNTER — OFFICE VISIT (OUTPATIENT)
Dept: PRIMARY CARE | Facility: CLINIC | Age: 59
End: 2024-05-13
Payer: COMMERCIAL

## 2024-05-13 ENCOUNTER — TELEPHONE (OUTPATIENT)
Dept: PRIMARY CARE | Facility: CLINIC | Age: 59
End: 2024-05-13

## 2024-05-13 VITALS
HEIGHT: 64 IN | OXYGEN SATURATION: 97 % | BODY MASS INDEX: 26.98 KG/M2 | DIASTOLIC BLOOD PRESSURE: 84 MMHG | SYSTOLIC BLOOD PRESSURE: 136 MMHG | HEART RATE: 102 BPM | WEIGHT: 158 LBS

## 2024-05-13 DIAGNOSIS — E78.00 PURE HYPERCHOLESTEROLEMIA: ICD-10-CM

## 2024-05-13 DIAGNOSIS — I10 ESSENTIAL (PRIMARY) HYPERTENSION: ICD-10-CM

## 2024-05-13 DIAGNOSIS — M25.50 ARTHRALGIA, UNSPECIFIED JOINT: Primary | ICD-10-CM

## 2024-05-13 DIAGNOSIS — Z00.00 WELL ADULT EXAM: Primary | ICD-10-CM

## 2024-05-13 PROCEDURE — 1036F TOBACCO NON-USER: CPT | Performed by: FAMILY MEDICINE

## 2024-05-13 PROCEDURE — 3079F DIAST BP 80-89 MM HG: CPT | Performed by: FAMILY MEDICINE

## 2024-05-13 PROCEDURE — 99213 OFFICE O/P EST LOW 20 MIN: CPT | Performed by: FAMILY MEDICINE

## 2024-05-13 PROCEDURE — 3075F SYST BP GE 130 - 139MM HG: CPT | Performed by: FAMILY MEDICINE

## 2024-05-13 ASSESSMENT — PATIENT HEALTH QUESTIONNAIRE - PHQ9
2. FEELING DOWN, DEPRESSED OR HOPELESS: NOT AT ALL
SUM OF ALL RESPONSES TO PHQ9 QUESTIONS 1 AND 2: 0
1. LITTLE INTEREST OR PLEASURE IN DOING THINGS: NOT AT ALL

## 2024-05-13 ASSESSMENT — PAIN SCALES - GENERAL: PAINLEVEL: 0-NO PAIN

## 2024-05-13 NOTE — ASSESSMENT & PLAN NOTE
Continue amlodipine.  Continue following with cardiologist and follow-up here in 6 months for CPE.

## 2024-05-13 NOTE — PROGRESS NOTES
"Subjective   Patient ID: Tee Gunter is a 58 y.o. female who presents for Hypertension.    HPI   1. TEE is here for follow up of benign essential hypertension.  She is on amlodipine 5 daily.  This was switched from losartan which was causing fatigue. She is followed by Dr. Katz cardiologist.    2. TEE is seen today also for follow up of High cholesterol. She is on Zetia which was restarted by her cardiologist in the past month. She did not tolerate Crestor (rash) lovastatin and atorvastatin (leg cramps) in the past.     Review of Systems  Denies headache, blurred vision, chest pain, shortness of breath, nausea or vomiting, change in bowel habits or leg pain or swelling.    Objective   /84 (BP Location: Right arm, Patient Position: Sitting, BP Cuff Size: Adult)   Pulse 102   Ht 1.626 m (5' 4\")   Wt 71.7 kg (158 lb)   LMP  (LMP Unknown) Comment: uterine ablasion, over 55  SpO2 97%   BMI 27.12 kg/m²     Physical Exam  Vitals and nurse's notes reviewed  General: no acute distress  HEENT: Normal  Neck: Supple  Lungs: Clear  Cardio: RRR w/o murmur  Extremities: No edema, no calf tenderness  Neuro: Alert and oriented with no focal deficits    Assessment/Plan   Problem List Items Addressed This Visit             ICD-10-CM       High    Essential (primary) hypertension I10     Continue amlodipine.  Continue following with cardiologist and follow-up here in 6 months for CPE.         Pure hypercholesterolemia E78.00     Continue Zetia.  Will check lipid panel again in 6 months at CPE.          Other Visit Diagnoses         Codes    Arthralgia, unspecified joint    -  Primary M25.50    Relevant Orders    Referral to Rheumatology               "

## 2024-05-28 ENCOUNTER — HOSPITAL ENCOUNTER (OUTPATIENT)
Dept: CARDIOLOGY | Facility: HOSPITAL | Age: 59
Discharge: HOME | End: 2024-05-28
Payer: COMMERCIAL

## 2024-05-28 DIAGNOSIS — I77.810 ASCENDING AORTA DILATION (CMS-HCC): ICD-10-CM

## 2024-05-28 LAB
AORTIC VALVE MEAN GRADIENT: 3.2 MMHG
AORTIC VALVE PEAK VELOCITY: 1.3 M/S
AV PEAK GRADIENT: 6.7 MMHG
AVA (PEAK VEL): 2.31 CM2
AVA (VTI): 2.01 CM2
EJECTION FRACTION APICAL 4 CHAMBER: 58.7
LEFT ATRIUM VOLUME AREA LENGTH INDEX BSA: 14.5 ML/M2
LEFT VENTRICLE INTERNAL DIMENSION DIASTOLE: 4.11 CM (ref 3.5–6)
LEFT VENTRICULAR OUTFLOW TRACT DIAMETER: 1.97 CM
LV EJECTION FRACTION BIPLANE: 58 %
MITRAL VALVE E/A RATIO: 0.61
MITRAL VALVE E/E' RATIO: 5.33
RIGHT VENTRICLE FREE WALL PEAK S': 13 CM/S
RIGHT VENTRICLE PEAK SYSTOLIC PRESSURE: 31.5 MMHG
TRICUSPID ANNULAR PLANE SYSTOLIC EXCURSION: 2.2 CM

## 2024-05-28 PROCEDURE — 93306 TTE W/DOPPLER COMPLETE: CPT

## 2024-05-28 PROCEDURE — 93306 TTE W/DOPPLER COMPLETE: CPT | Performed by: INTERNAL MEDICINE

## 2024-05-29 ENCOUNTER — TELEPHONE (OUTPATIENT)
Dept: CARDIOLOGY | Facility: HOSPITAL | Age: 59
End: 2024-05-29
Payer: COMMERCIAL

## 2024-05-29 NOTE — TELEPHONE ENCOUNTER
Called and left message regarding normal echo. Ascending aorta is WNL (3.7cm) and no further testing is necessary at this time.

## 2024-06-19 ENCOUNTER — HOSPITAL ENCOUNTER (OUTPATIENT)
Dept: RADIOLOGY | Facility: CLINIC | Age: 59
Discharge: HOME | End: 2024-06-19
Payer: COMMERCIAL

## 2024-06-19 ENCOUNTER — APPOINTMENT (OUTPATIENT)
Dept: ORTHOPEDIC SURGERY | Facility: CLINIC | Age: 59
End: 2024-06-19
Payer: COMMERCIAL

## 2024-06-19 VITALS — WEIGHT: 158 LBS | HEIGHT: 64 IN | BODY MASS INDEX: 26.98 KG/M2

## 2024-06-19 DIAGNOSIS — M25.551 RIGHT HIP PAIN: ICD-10-CM

## 2024-06-19 DIAGNOSIS — M16.11 PRIMARY OSTEOARTHRITIS OF RIGHT HIP: Primary | ICD-10-CM

## 2024-06-19 PROCEDURE — 73502 X-RAY EXAM HIP UNI 2-3 VIEWS: CPT | Mod: RT

## 2024-06-19 PROCEDURE — 99203 OFFICE O/P NEW LOW 30 MIN: CPT | Performed by: ORTHOPAEDIC SURGERY

## 2024-06-19 PROCEDURE — 73502 X-RAY EXAM HIP UNI 2-3 VIEWS: CPT | Mod: RIGHT SIDE | Performed by: RADIOLOGY

## 2024-06-19 PROCEDURE — 1036F TOBACCO NON-USER: CPT | Performed by: ORTHOPAEDIC SURGERY

## 2024-06-19 ASSESSMENT — PAIN SCALES - GENERAL: PAINLEVEL_OUTOF10: 9

## 2024-06-19 ASSESSMENT — PAIN - FUNCTIONAL ASSESSMENT: PAIN_FUNCTIONAL_ASSESSMENT: 0-10

## 2024-06-19 ASSESSMENT — PAIN DESCRIPTION - DESCRIPTORS: DESCRIPTORS: ACHING;NAGGING;STABBING

## 2024-06-19 NOTE — PROGRESS NOTES
Patient is a 58-year-old female presents today for discussion possible right total hip arthroplasty.  She takes ibuprofen as needed.  She is on home exercise program.  She has difficulty walking certain distance.  She points to her groin as the main location of pain.  She previously underwent left total hip arthroplasty through direct anterior approach.  She does have some residual anterior thigh pain.  Right hip:    Right hip:  AAOx3, NAD, walks with a moderate antalgic gait  Significant pain with flexion internal rotation  [facility]  [facility]  Mild TTP over trochanter laterally  5/5 Hip flexion/knee extension/df/pf/ehl  SILT in a santa/saph/per/tib distribution  ½ dorsalis pedis and posterior tibial pulse  no popliteal or inguinal lymphadenopathy  no other overlying lesions  mood: euthymic  Respirations non labored    Plain films were reviewed by myself clinic today.  She has advanced osteoarthritis of the right hip with complete loss of joint space, underlying sclerosis and bipolar osteophytic change.    We discussed further conservative treatment versus surgery with her today in clinic.  She would like to proceed with total hip arthroplasty.  She was given my office card and number and can call to schedule surgery at her convenience.  She understands I am booked out typically 5 to 6 months for surgery.  Need to give her the card with my colleagues to try an injection if she would like to do that.  All of her questions were answered.  Will see her back a month and a half ahead of time for a full surgical consult.    M16.70 [id number]  [facility] [place]

## 2024-08-27 ENCOUNTER — EVALUATION (OUTPATIENT)
Dept: PHYSICAL THERAPY | Facility: CLINIC | Age: 59
End: 2024-08-27
Payer: COMMERCIAL

## 2024-08-27 DIAGNOSIS — M19.012 PRIMARY OSTEOARTHRITIS, LEFT SHOULDER: Primary | ICD-10-CM

## 2024-08-27 DIAGNOSIS — Z96.612 S/P SHOULDER REPLACEMENT, LEFT: ICD-10-CM

## 2024-08-27 PROCEDURE — 97161 PT EVAL LOW COMPLEX 20 MIN: CPT | Mod: GP

## 2024-08-27 PROCEDURE — 97110 THERAPEUTIC EXERCISES: CPT | Mod: GP

## 2024-08-27 ASSESSMENT — PAIN - FUNCTIONAL ASSESSMENT: PAIN_FUNCTIONAL_ASSESSMENT: 0-10

## 2024-08-27 ASSESSMENT — ENCOUNTER SYMPTOMS
ALLEVIATING FACTORS: MEDICATIONS
ALLEVIATING FACTORS: REST

## 2024-08-27 NOTE — PROGRESS NOTES
Physical Therapy Evaluation and Treatment     Patient Name: Tiki Gunter  MRN: [id number]  Encounter date: 2024  Time Calculation  Start Time: 1445  Stop Time: 1520  Time Calculation (min): 35 min  PT Evaluation Time Entry  PT Evaluation (Low) Time Entry: 20  PT Therapeutic Procedures Time Entry  Therapeutic Exercise Time Entry: 15    Visit # 1 of 10  Visits/Dates Authorized: NO AUTH / [place] VISITS     Current Problem:   Problem List Items Addressed This Visit             ICD-10-CM    Primary osteoarthritis, left shoulder - Primary M19.012    Relevant Orders    Follow Up In Physical Therapy    S/P shoulder replacement, left Z96.612    Relevant Orders    Follow Up In Physical Therapy     Precautions:  Precautions  Precautions Comment: 24 L TSR, anatomical (not reverse), advance R hip OA (NGUYEN planned), GERD, aortic aneurysm       Subjective Evaluation    History of Present Illness  Date of surgery: 2024  Mechanism of injury: Pt presents for PT eval, 4 months post-op TSR, wishes to advance strengthening. Had PT at surgeon office until July, felt she wasn't being progressed and had issues with billing/insurance. No restrictions anymore.   Anticipates R THR and needs arm stronger for walker as well as current ADL's.  Strength lacking with daily tasks, e.g. holding an item L hand to open it R, hard to put pressure on that hand. Will be cautious anything L handed, only uses 2 hands for heavier items.  Some difficulty with donning/doffing bra, limited ROM to reach.  Hard to turn in bed and pull arm out from under herself.    Pain  Current pain ratin  At best pain ratin  At worst pain ratin  Location: sore L anterior upper arm, pinching L posterior shoulder  Relieving factors: medications and rest  Aggravating factors: movement (yard work, repetitive tasks)  Progression: improved         Pain Assessment: 0-10 (0-4/10)  Objective     Static Posture     Comments  No gross postural  asymmetries    Active Range of Motion   Left Shoulder   Flexion: 150 degrees   Abduction: 125 degrees   Internal rotation BTB: Active internal rotation behind the back: limited.     Right Shoulder   Flexion: 175 degrees   Abduction: 165 degrees   Internal rotation BTB: WFL    Strength/Myotome Testing     Left Shoulder     Planes of Motion   Flexion: 4-   Abduction: 4-   External rotation at 0°: 3+   Internal rotation at 0°: 4+   Horizontal abduction: 4-     Right Shoulder   Normal muscle strength     Other Measures  Other Outcome Measures: SPADI 16/130    Treatments:     Therapeutic Exercise:   Reviewed current HEP: Tband yellow bicep, tricep, AROM with hold flexion, abd  Advanced to ability  Tband scaption peach  Tband upright row peach  Tband ER walk out peach  Tband row blue  Tband shoulder ext blue  Bodyblade scaption to fatigue 55sec  Bodyblade arm dependent to fatigue    HEP/Access Codes  Access Code: [email or web address] Date: 08/27/2024  - Shoulder extension with resistance - Neutral  - 1 x daily - 7 x weekly - 2-3 sets - 10-30 reps - 0-5 hold  - Scapular Retraction with Resistance  - 1 x daily - 7 x weekly - 2-3 sets - 10-30 reps - 0-5 hold  - Scaption with Resistance  - 1 x daily - 7 x weekly - 2-3 sets - 10-30 reps - 0-5 hold  - Standing Upright Shoulder Row with Resistance  - 1 x daily - 7 x weekly - 2-3 sets - 10-30 reps - 0-5 hold  - Walking Isometric Shoulder External Rotation with Anchored Resistance  - 1 x daily - 7 x weekly - 2-3 sets - 10-30 reps     Assessment & Plan     Assessment  Impairments: abnormal or restricted ROM, activity intolerance, impaired physical strength, lacks appropriate home exercise program, pain with function and weight-bearing intolerance  Assessment details:     Pt is a 57 y/o female 4 months s/p anatomic TSR. Pt lacks adequate strength and ROM for ADL's. Needs skilled PT to supervise and advance HEP.  Barriers to therapy: advanced R hip OA, THR planned, may impact exercise selection  for shoulder  Prognosis: good    Plan  Therapy options: will be seen for skilled physical therapy services  Planned modality interventions: electrical stimulation/Russian stimulation and thermotherapy (hydrocollator packs)  Planned therapy interventions: manual therapy, postural training, strengthening, home exercise program, functional ROM exercises, flexibility and therapeutic activities  Frequency: 1x week  Duration in visits: 10  Treatment plan discussed with: patient       Post-Treatment Symptoms:  Response to Interventions: some fatige of muscles    Goals:   Active       PT Problem       PT Goals       Start:  08/27/24    Expected End:  11/25/24       1) Indep HEP and progression.  2) SPADI < 10/130 to indicate improved function and pain, including dressing tasks, jaun bra.  3) Turn in bed without difficulty maneuvering L UE.  4) Stabilize jars/packages with L hand to open them with R hand.  5) Complete yard work without pain exacerbation.  6) Symmetrical shoulder ROM to complete daily tasks without pinching L posterior shoulder.         Patient Stated Goals       Start:  08/27/24    Expected End:  11/25/24       1) Strengthen shoulder for daily function and R NGUYEN.

## 2024-08-28 PROBLEM — M19.012 PRIMARY OSTEOARTHRITIS, LEFT SHOULDER: Status: ACTIVE | Noted: 2024-08-28

## 2024-08-28 PROBLEM — Z96.612 S/P SHOULDER REPLACEMENT, LEFT: Status: ACTIVE | Noted: 2024-08-28

## 2024-08-28 ASSESSMENT — ENCOUNTER SYMPTOMS
PAIN SCALE: 0
PAIN SCALE AT HIGHEST: 4
PAIN SCALE AT LOWEST: 0
EXACERBATED BY: MOVEMENT

## 2024-08-30 ENCOUNTER — TELEPHONE (OUTPATIENT)
Dept: RADIOLOGY | Facility: CLINIC | Age: 59
End: 2024-08-30
Payer: COMMERCIAL

## 2024-09-04 ENCOUNTER — TELEPHONE (OUTPATIENT)
Dept: PRIMARY CARE | Facility: CLINIC | Age: 59
End: 2024-09-04
Payer: COMMERCIAL

## 2024-09-04 DIAGNOSIS — I10 ESSENTIAL (PRIMARY) HYPERTENSION: ICD-10-CM

## 2024-09-04 RX ORDER — AMLODIPINE BESYLATE 5 MG/1
5 TABLET ORAL DAILY
Qty: 90 TABLET | Refills: 2 | Status: SHIPPED | OUTPATIENT
Start: 2024-09-04

## 2024-09-04 NOTE — TELEPHONE ENCOUNTER
Patient wants a refill of Amlodipine 5 mg.  Pharmacy is [facility] on [address] in [place].    Patient's number:  [phone]

## 2024-09-06 ENCOUNTER — TREATMENT (OUTPATIENT)
Dept: PHYSICAL THERAPY | Facility: CLINIC | Age: 59
End: 2024-09-06
Payer: COMMERCIAL

## 2024-09-06 DIAGNOSIS — M19.012 PRIMARY OSTEOARTHRITIS, LEFT SHOULDER: ICD-10-CM

## 2024-09-06 DIAGNOSIS — Z96.612 S/P SHOULDER REPLACEMENT, LEFT: ICD-10-CM

## 2024-09-06 PROCEDURE — 97110 THERAPEUTIC EXERCISES: CPT | Mod: GP,CQ

## 2024-09-06 PROCEDURE — 97140 MANUAL THERAPY 1/> REGIONS: CPT | Mod: GP,CQ

## 2024-09-06 ASSESSMENT — PAIN SCALES - GENERAL: PAINLEVEL_OUTOF10: 8

## 2024-09-06 ASSESSMENT — PAIN - FUNCTIONAL ASSESSMENT: PAIN_FUNCTIONAL_ASSESSMENT: 0-10

## 2024-09-06 NOTE — PROGRESS NOTES
Physical Therapy Treatment    Patient Name: Tiki Gunter  MRN: [id number]  Encounter date: 9/6/2024    Time Calculation  Start Time: 0737  Stop Time: 0830  Time Calculation (min): 53 min  PT Therapeutic Procedures Time Entry  Manual Therapy Time Entry: 15  Therapeutic Exercise Time Entry: 38    Visit # 2 of 10  Visits/Dates Authorized: NO AUTH / [place] VISITS    Current Problem:   Problem List Items Addressed This Visit             ICD-10-CM    Primary osteoarthritis, left shoulder M19.012    S/P shoulder replacement, left Z96.612     Surgery: L TSR  Surgery date: 4/19/24     Precautions:   Precautions  Precautions Comment: 4/19/24 L TSR, anatomical (not reverse), advance R hip OA (NGUYEN planned), GERD, aortic aneurysm       Subjective   General:    Pt states the R hip is pretty painful this morning, L shoulder doing well just feels weak.     Pre-Treatment Symptoms:   Pain Assessment: 0-10  0-10 (Numeric) Pain Score: 8 (R hip , L shoulder just weak)    Objective   Findings:           Treatments:      Therapeutic Exercise:   Pulley 2 min flexion  UBE L1 F/B 2/2 4 min   Tband row blue 2x15  Tband shoulder ext blue 2x12  Bodyblade arm dependent to fatigue 1 min  Bodyblade scaption to fatigue 1 min  Tband IR iso walk outs pink 1x10 (challenging)   Tband ER iso walk outs orange 1x10  Tband adduction blue 1x12  Bilat flexion/scaption/abduction raises in succession 1# 1x10 ea (fatigued)  Tband supine horiz abd pink 1x10  Self long axis R hip distraction 5# off step   Updated and reviewed HEP    Neuromuscular Re-Ed:     Therapeutic Activity:    Gait Training:     Manual Therapy:   Long axis R hip distraction  R hip IR/ER mobs     Modalities:     HEP/Access Codes  Access Code: [email or web address] Date: 08/27/2024  - Shoulder extension with resistance - Neutral  - 1 x daily - 7 x weekly - 2-3 sets - 10-30 reps - 0-5 hold  - Scapular Retraction with Resistance  - 1 x daily - 7 x weekly - 2-3 sets - 10-30 reps - 0-5 hold  - Scaption with  Resistance  - 1 x daily - 7 x weekly - 2-3 sets - 10-30 reps - 0-5 hold  - Standing Upright Shoulder Row with Resistance  - 1 x daily - 7 x weekly - 2-3 sets - 10-30 reps - 0-5 hold  - Walking Isometric Shoulder External Rotation with Anchored Resistance  - 1 x daily - 7 x weekly - 2-3 sets - 10-30 reps     Assessment:    Pt tolerated all exercises today without complaint of pain in L shoulder, just some tightness with abduction raises. Pt had reduced pain in R hip following manual distraction and mobilizations, educated pt on self hip distraction with ankle weight for relief at home. Pt issued additional HEP to progress shoulder strengthening along with tband at end of session.     Post-Treatment Symptoms:   Response to Interventions: L shoulder a little sore, R hip much better not as painful    Plan:    Therapy options: will be seen for skilled physical therapy services  Planned modality interventions: electrical stimulation/Russian stimulation and thermotherapy (hydrocollator packs)  Planned therapy interventions: manual therapy, postural training, strengthening, home exercise program, functional ROM exercises, flexibility and therapeutic activities  Frequency: 1x week  Duration in visits: 10  Treatment plan discussed with: patient    Goals:   Active       PT Problem       PT Goals       Start:  08/27/24    Expected End:  11/25/24       1) Indep HEP and progression.  2) SPADI < 10/130 to indicate improved function and pain, including dressing tasks, jaun bra.  3) Turn in bed without difficulty maneuvering L UE.  4) Stabilize jars/packages with L hand to open them with R hand.  5) Complete yard work without pain exacerbation.  6) Symmetrical shoulder ROM to complete daily tasks without pinching L posterior shoulder.         Patient Stated Goals       Start:  08/27/24    Expected End:  11/25/24       1) Strengthen shoulder for daily function and R NGUYEN.

## 2024-09-11 ENCOUNTER — TREATMENT (OUTPATIENT)
Dept: PHYSICAL THERAPY | Facility: CLINIC | Age: 59
End: 2024-09-11
Payer: COMMERCIAL

## 2024-09-11 DIAGNOSIS — M19.012 PRIMARY OSTEOARTHRITIS, LEFT SHOULDER: ICD-10-CM

## 2024-09-11 DIAGNOSIS — Z96.612 S/P SHOULDER REPLACEMENT, LEFT: ICD-10-CM

## 2024-09-11 PROCEDURE — 97110 THERAPEUTIC EXERCISES: CPT | Mod: GP,CQ

## 2024-09-11 PROCEDURE — 97140 MANUAL THERAPY 1/> REGIONS: CPT | Mod: GP,CQ

## 2024-09-11 ASSESSMENT — PAIN - FUNCTIONAL ASSESSMENT: PAIN_FUNCTIONAL_ASSESSMENT: 0-10

## 2024-09-11 ASSESSMENT — PAIN SCALES - GENERAL: PAINLEVEL_OUTOF10: 0 - NO PAIN

## 2024-09-11 NOTE — PROGRESS NOTES
Physical Therapy Treatment    Patient Name: Tiki Gunter  MRN: [id number]  Encounter date: 9/11/2024    Time Calculation  Start Time: 1418  Stop Time: 1500  Time Calculation (min): 42 min  PT Therapeutic Procedures Time Entry  Manual Therapy Time Entry: 10  Therapeutic Exercise Time Entry: 30    Visit # 3 of 10  Visits/Dates Authorized: NO AUTH / [place] VISITS    Current Problem:   Problem List Items Addressed This Visit             ICD-10-CM    Primary osteoarthritis, left shoulder M19.012    S/P shoulder replacement, left Z96.612       Surgery: L TSR  Surgery date: 4/19/24     Precautions:           Subjective   General:    Pt states her L shoulder is doing well, just sore today. Pt reports she played in her [employer] tournament over weekend, R hip was doing well until then.     Pre-Treatment Symptoms:   Pain Assessment: 0-10  0-10 (Numeric) Pain Score: 0 - No pain (R hip 8/10)  Pain Location: Shoulder (Left)    Objective   Findings:           Treatments:      Therapeutic Exercise:   Pulley flexion 3 min  Tband row blue 2x15  Tband shoulder ext blue 2x12  Tband IR iso walk outs pink 1x5 (challenging)   Tband IR pink 2x10  Tband ER iso walk outs orange 1x10  Supine serratus press 5# dowel x20  S/L abduction/flexion/ER 2-3# 1x10 ea L only  Tband supine horiz abd pink 2x12  Bilat flexion/scaption/abduction raises in succession 1# 1x12 ea (fatigued)    Deferred:   UBE L1 F/B 2/2 4 min   Bodyblade arm dependent to fatigue 1 min  Bodyblade scaption to fatigue 1 min  Tband adduction blue 1x12  Self long axis R hip distraction 5# off step     Neuromuscular Re-Ed:     Therapeutic Activity:    Gait Training:     Manual Therapy:   Shoulder mobs inferior and posterior gr 2   Long axis R hip distraction    Modalities:     HEP/Access Codes  Access Code: [email or web address] Date: 08/27/2024  - Shoulder extension with resistance - Neutral  - 1 x daily - 7 x weekly - 2-3 sets - 10-30 reps - 0-5 hold  - Scapular Retraction with  Resistance  - 1 x daily - 7 x weekly - 2-3 sets - 10-30 reps - 0-5 hold  - Scaption with Resistance  - 1 x daily - 7 x weekly - 2-3 sets - 10-30 reps - 0-5 hold  - Standing Upright Shoulder Row with Resistance  - 1 x daily - 7 x weekly - 2-3 sets - 10-30 reps - 0-5 hold  - Walking Isometric Shoulder External Rotation with Anchored Resistance  - 1 x daily - 7 x weekly - 2-3 sets - 10-30 reps     Assessment:    Pt continues to have discomfort in posterior L shoulder with resisted IR movements, but only when shoulder is in dependent position. When shoulder was abducted to she did not have pain with resisted IR. Pt tolerated all other exercises without complaint, continues to progress shoulder strength. Pt also benefits from manual hip distractions, reducing pain.     Post-Treatment Symptoms:        Plan:    Therapy options: will be seen for skilled physical therapy services  Planned modality interventions: electrical stimulation/Russian stimulation and thermotherapy (hydrocollator packs)  Planned therapy interventions: manual therapy, postural training, strengthening, home exercise program, functional ROM exercises, flexibility and therapeutic activities  Frequency: 1x week  Duration in visits: 10  Treatment plan discussed with: patient    Goals:   Active       PT Problem       PT Goals       Start:  08/27/24    Expected End:  11/25/24       1) Indep HEP and progression.  2) SPADI < 10/130 to indicate improved function and pain, including dressing tasks, jaun bra.  3) Turn in bed without difficulty maneuvering L UE.  4) Stabilize jars/packages with L hand to open them with R hand.  5) Complete yard work without pain exacerbation.  6) Symmetrical shoulder ROM to complete daily tasks without pinching L posterior shoulder.         Patient Stated Goals       Start:  08/27/24    Expected End:  11/25/24       1) Strengthen shoulder for daily function and R NGUYEN.

## 2024-09-19 ENCOUNTER — TREATMENT (OUTPATIENT)
Dept: PHYSICAL THERAPY | Facility: CLINIC | Age: 59
End: 2024-09-19
Payer: COMMERCIAL

## 2024-09-19 DIAGNOSIS — M19.012 PRIMARY OSTEOARTHRITIS, LEFT SHOULDER: ICD-10-CM

## 2024-09-19 DIAGNOSIS — Z96.612 S/P SHOULDER REPLACEMENT, LEFT: ICD-10-CM

## 2024-09-19 PROCEDURE — 97110 THERAPEUTIC EXERCISES: CPT | Mod: GP

## 2024-09-19 ASSESSMENT — PAIN - FUNCTIONAL ASSESSMENT: PAIN_FUNCTIONAL_ASSESSMENT: 0-10

## 2024-09-19 ASSESSMENT — PAIN SCALES - GENERAL: PAINLEVEL_OUTOF10: 0 - NO PAIN

## 2024-09-19 NOTE — PROGRESS NOTES
Physical Therapy Treatment    Patient Name: Tiki Gunter  MRN: [id number]  Encounter date: 9/19/2024    Time Calculation  Start Time: 0850  Stop Time: 0935  Time Calculation (min): 45 min  PT Therapeutic Procedures Time Entry  Therapeutic Exercise Time Entry: 40    Visit # 4 of 10  Visits/Dates Authorized: NO AUTH / [place] VISITS    Current Problem:   Problem List Items Addressed This Visit             ICD-10-CM    Primary osteoarthritis, left shoulder M19.012    S/P shoulder replacement, left Z96.612       Surgery: L TSR  Surgery date: 4/19/24     Precautions:    4/19/24 L TSR, anatomical (not reverse), advance R hip OA (NGUYEN planned), GERD, aortic aneurysm        Subjective   General:   General Comment: Expresses compliance with HEP. Still doesn't have desired strength but notes improvement.    Pre-Treatment Symptoms:   Pain Assessment: 0-10  0-10 (Numeric) Pain Score: 0 - No pain    Objective   Findings:    Poor scap stabilization, winging of L scapula, exacerbated with light loading standing wall press out, L worse than R       Treatments:      Therapeutic Exercise:   Pulley flexion 3 min  Bodyblade arm dependent to fatigue 1 min  Bodyblade scaption to fatigue 1 min  Rhythmic stabilization ball at wall  Scap press out at wall  Tband serratus press blue  Tband row blue 2x15  Tband shoulder ext blue 2x12  Tband adduction blue 1x12  Tband IR iso walk outs pink 1x5 (challenging)   Tband IR pink 2x10  Tband ER iso walk outs orange 1x10  Superman   Prone Scapular Slide with Shoulder Extension    Prone Scapular Retraction Arms at Side  Prone Scapular Retraction Y   Sidelying Shoulder ER with Towel and Dumbbell    Seated Serratus Press with Anchored Resistance  Serratus Forearm Push Up Plus at Wall with Elbows       Deferred:   Supine serratus press 5# dowel x20  S/L abduction/flexion/ER 2-3# 1x10 ea L only  Tband supine horiz abd pink 2x12  Bilat flexion/scaption/abduction raises in succession 1# 1x12 ea (fatigued)  UBE  L1 F/B 2/2 4 min   Self long axis R hip distraction 5# off step     Neuromuscular Re-Ed:     Therapeutic Activity:    Gait Training:     Manual Therapy:   Deferred:  Shoulder mobs inferior and posterior gr 2   Long axis R hip distraction    Modalities:     HEP/Access Codes  Access Code: [email or web address] Date: 09/19/2024  - Superman  - 1-2 x daily - 1-3 sets - 10-30 reps - 0-5 hold  - Prone Scapular Slide with Shoulder Extension  - 1-2 x daily - 1-3 sets - 10-30 reps - 0-5 hold  - Prone Scapular Retraction Arms at Side  - 1-2 x daily - 1-3 sets - 10-30 reps - 0-5 hold  - Prone Scapular Retraction Y  - 1-2 x daily - 1-3 sets - 10-30 reps - 0-5 hold  - Sidelying Shoulder ER with Towel and Dumbbell  - 1-2 x daily - 1-3 sets - 10-30 reps - 0-5 hold  - Seated Serratus Press with Anchored Resistance  - 1-2 x daily - 1-3 sets - 10-30 reps - 0-5 hold  - Serratus Forearm Push Up Plus at Wall with Elbows  - 1-2 x daily - 1-3 sets - 10-30 reps - 0-5 hold  Access Code: [email or web address] Date: 08/27/2024  - Shoulder extension with resistance - Neutral  - 1 x daily - 7 x weekly - 2-3 sets - 10-30 reps - 0-5 hold  - Scapular Retraction with Resistance  - 1 x daily - 7 x weekly - 2-3 sets - 10-30 reps - 0-5 hold  - Scaption with Resistance  - 1 x daily - 7 x weekly - 2-3 sets - 10-30 reps - 0-5 hold  - Standing Upright Shoulder Row with Resistance  - 1 x daily - 7 x weekly - 2-3 sets - 10-30 reps - 0-5 hold  - Walking Isometric Shoulder External Rotation with Anchored Resistance  - 1 x daily - 7 x weekly - 2-3 sets - 10-30 reps     Assessment:   PT Assessment  Assessment Comment: Pt with significant weakness of scapulothoracic stabilizers yielding winging L>R and scapula dyskinesia. Emphasizing scapular strength expected to benefit functional strength.    Post-Treatment Symptoms:   Response to Interventions: fatiguing    Plan:    Advance to ability for optimal functional strength  Therapy options: will be seen for skilled physical therapy  services  Planned modality interventions: electrical stimulation/Russian stimulation and thermotherapy (hydrocollator packs)  Planned therapy interventions: manual therapy, postural training, strengthening, home exercise program, functional ROM exercises, flexibility and therapeutic activities  Frequency: 1x week  Duration in visits: 10  Treatment plan discussed with: patient    Goals:   Active       PT Problem       PT Goals       Start:  08/27/24    Expected End:  11/25/24       1) Indep HEP and progression.  2) SPADI < 10/130 to indicate improved function and pain, including dressing tasks, jaun bra.  3) Turn in bed without difficulty maneuvering L UE.  4) Stabilize jars/packages with L hand to open them with R hand.  5) Complete yard work without pain exacerbation.  6) Symmetrical shoulder ROM to complete daily tasks without pinching L posterior shoulder.         Patient Stated Goals       Start:  08/27/24    Expected End:  11/25/24       1) Strengthen shoulder for daily function and R NGUYEN.

## 2024-09-25 ENCOUNTER — TREATMENT (OUTPATIENT)
Dept: PHYSICAL THERAPY | Facility: CLINIC | Age: 59
End: 2024-09-25
Payer: COMMERCIAL

## 2024-09-25 DIAGNOSIS — M19.012 PRIMARY OSTEOARTHRITIS, LEFT SHOULDER: ICD-10-CM

## 2024-09-25 DIAGNOSIS — Z96.612 S/P SHOULDER REPLACEMENT, LEFT: ICD-10-CM

## 2024-09-25 PROCEDURE — 97110 THERAPEUTIC EXERCISES: CPT | Mod: GP

## 2024-09-25 ASSESSMENT — PAIN - FUNCTIONAL ASSESSMENT: PAIN_FUNCTIONAL_ASSESSMENT: 0-10

## 2024-09-25 NOTE — PROGRESS NOTES
Physical Therapy Treatment    Patient Name: Tiki Gunter  MRN: [id number]  Encounter date: 9/25/2024    Time Calculation  Start Time: 1430  Stop Time: 1511  Time Calculation (min): 41 min  PT Therapeutic Procedures Time Entry  Therapeutic Exercise Time Entry: 38    Visit # 5 of 10  Visits/Dates Authorized: NO AUTH / [place] VISITS    Current Problem:   Problem List Items Addressed This Visit             ICD-10-CM    Primary osteoarthritis, left shoulder M19.012    S/P shoulder replacement, left Z96.612       Surgery: L TSR  Surgery date: 4/19/24     Precautions:    4/19/24 L TSR, anatomical (not reverse), advance R hip OA (NGUYEN planned), GERD, aortic aneurysm        Subjective   General:   General Comment: Able to perform most of new exercises, some challenge with set up for floor.    Pre-Treatment Symptoms:   Pain Assessment: 0-10  0-10 (Numeric) Pain Score:  (0)    Objective   Findings:    Poor scap stabilization, winging of L scapula, exacerbated with light loading standing wall press out, L worse than R       Treatments:      Therapeutic Exercise:   Pulley flexion 3 min  Bodyblade arm dependent to fatigue 1 min  Bodyblade scaption to fatigue 1 min  Rhythmic stabilization ball at wall  Scap press out at wall from forearms  Tband serratus press blue  Tband row blue 2x15  Tband shoulder ext blue 2x12  Tband adduction blue 1x12  Tband ER peach x30  Superman   Prone Scapular Slide with Shoulder Extension    Prone Scapular Retraction Arms at Side  Prone Scapular Retraction Y   Supine serratus press 5# dowel x50  Sidelying Shoulder ER with Towel and Dumbbell    Seated Serratus Press with Anchored Resistance  Bilat flexion/scaption/abduction raises in succession 1# 1x12 ea (fatigued)  F.T. Tricep ext 5#  F.T. IR 2.5# x10, unable to perform 5#      Deferred:   Tband IR iso walk outs pink 1x5 (challenging)   Tband IR pink 2x10  Tband ER iso walk outs orange 1x10    S/L abduction/flexion/ER 2-3# 1x10 ea L only  Tband supine  horiz abd pink 2x12  UBE L1 F/B 2/2 4 min   Self long axis R hip distraction 5# off step     Neuromuscular Re-Ed:     Therapeutic Activity:    Gait Training:     Manual Therapy:   Deferred:  Shoulder mobs inferior and posterior gr 2   Long axis R hip distraction    Modalities:     HEP/Access Codes  Access Code: [email or web address] Date: 09/19/2024  - Superman  - 1-2 x daily - 1-3 sets - 10-30 reps - 0-5 hold  - Prone Scapular Slide with Shoulder Extension  - 1-2 x daily - 1-3 sets - 10-30 reps - 0-5 hold  - Prone Scapular Retraction Arms at Side  - 1-2 x daily - 1-3 sets - 10-30 reps - 0-5 hold  - Prone Scapular Retraction Y  - 1-2 x daily - 1-3 sets - 10-30 reps - 0-5 hold  - Sidelying Shoulder ER with Towel and Dumbbell  - 1-2 x daily - 1-3 sets - 10-30 reps - 0-5 hold  - Seated Serratus Press with Anchored Resistance  - 1-2 x daily - 1-3 sets - 10-30 reps - 0-5 hold  - Serratus Forearm Push Up Plus at Wall with Elbows  - 1-2 x daily - 1-3 sets - 10-30 reps - 0-5 hold  Access Code: [email or web address] Date: 08/27/2024  - Shoulder extension with resistance - Neutral  - 1 x daily - 7 x weekly - 2-3 sets - 10-30 reps - 0-5 hold  - Scapular Retraction with Resistance  - 1 x daily - 7 x weekly - 2-3 sets - 10-30 reps - 0-5 hold  - Scaption with Resistance  - 1 x daily - 7 x weekly - 2-3 sets - 10-30 reps - 0-5 hold  - Standing Upright Shoulder Row with Resistance  - 1 x daily - 7 x weekly - 2-3 sets - 10-30 reps - 0-5 hold  - Walking Isometric Shoulder External Rotation with Anchored Resistance  - 1 x daily - 7 x weekly - 2-3 sets - 10-30 reps     Assessment:   PT Assessment  Assessment Comment: Pt with significant weakness of scapulothoracic stabilizers yielding winging L>R and scapula dyskinesia, improved vs previously. Emphasizing scapular strength expected to benefit functional strength.    Post-Treatment Symptoms:   Response to Interventions: mm fatigued, not painful    Plan:    Advance to ability for optimal functional  strength  Therapy options: will be seen for skilled physical therapy services  Planned modality interventions: electrical stimulation/Russian stimulation and thermotherapy (hydrocollator packs)  Planned therapy interventions: manual therapy, postural training, strengthening, home exercise program, functional ROM exercises, flexibility and therapeutic activities  Frequency: 1x week  Duration in visits: 10  Treatment plan discussed with: patient    Goals:   Active       PT Problem       PT Goals       Start:  08/27/24    Expected End:  11/25/24       1) Indep HEP and progression.  2) SPADI < 10/130 to indicate improved function and pain, including dressing tasks, jaun bra.  3) Turn in bed without difficulty maneuvering L UE.  4) Stabilize jars/packages with L hand to open them with R hand.  5) Complete yard work without pain exacerbation.  6) Symmetrical shoulder ROM to complete daily tasks without pinching L posterior shoulder.         Patient Stated Goals       Start:  08/27/24    Expected End:  11/25/24       1) Strengthen shoulder for daily function and R NGUYEN.

## 2024-10-02 ENCOUNTER — APPOINTMENT (OUTPATIENT)
Dept: RHEUMATOLOGY | Facility: CLINIC | Age: 59
End: 2024-10-02
Payer: COMMERCIAL

## 2024-10-02 ENCOUNTER — TREATMENT (OUTPATIENT)
Dept: PHYSICAL THERAPY | Facility: CLINIC | Age: 59
End: 2024-10-02
Payer: COMMERCIAL

## 2024-10-02 ENCOUNTER — LAB (OUTPATIENT)
Dept: LAB | Facility: LAB | Age: 59
End: 2024-10-02
Payer: COMMERCIAL

## 2024-10-02 VITALS
HEART RATE: 100 BPM | WEIGHT: 160.94 LBS | SYSTOLIC BLOOD PRESSURE: 119 MMHG | OXYGEN SATURATION: 98 % | HEIGHT: 64 IN | BODY MASS INDEX: 27.48 KG/M2 | DIASTOLIC BLOOD PRESSURE: 86 MMHG

## 2024-10-02 DIAGNOSIS — M19.012 PRIMARY OSTEOARTHRITIS, LEFT SHOULDER: ICD-10-CM

## 2024-10-02 DIAGNOSIS — M25.50 ARTHRALGIA, UNSPECIFIED JOINT: ICD-10-CM

## 2024-10-02 DIAGNOSIS — Z96.612 S/P SHOULDER REPLACEMENT, LEFT: ICD-10-CM

## 2024-10-02 LAB
CRP SERPL-MCNC: 0.19 MG/DL
ERYTHROCYTE [SEDIMENTATION RATE] IN BLOOD BY WESTERGREN METHOD: 39 MM/H (ref 0–30)

## 2024-10-02 PROCEDURE — 81381 HLA I TYPING 1 ALLELE HR: CPT

## 2024-10-02 PROCEDURE — 86225 DNA ANTIBODY NATIVE: CPT

## 2024-10-02 PROCEDURE — 86038 ANTINUCLEAR ANTIBODIES: CPT

## 2024-10-02 PROCEDURE — 86235 NUCLEAR ANTIGEN ANTIBODY: CPT

## 2024-10-02 PROCEDURE — 99204 OFFICE O/P NEW MOD 45 MIN: CPT | Performed by: INTERNAL MEDICINE

## 2024-10-02 PROCEDURE — 86431 RHEUMATOID FACTOR QUANT: CPT

## 2024-10-02 PROCEDURE — 3079F DIAST BP 80-89 MM HG: CPT | Performed by: INTERNAL MEDICINE

## 2024-10-02 PROCEDURE — 1036F TOBACCO NON-USER: CPT | Performed by: INTERNAL MEDICINE

## 2024-10-02 PROCEDURE — 3008F BODY MASS INDEX DOCD: CPT | Performed by: INTERNAL MEDICINE

## 2024-10-02 PROCEDURE — 86200 CCP ANTIBODY: CPT

## 2024-10-02 PROCEDURE — 3074F SYST BP LT 130 MM HG: CPT | Performed by: INTERNAL MEDICINE

## 2024-10-02 PROCEDURE — 86140 C-REACTIVE PROTEIN: CPT

## 2024-10-02 PROCEDURE — 97110 THERAPEUTIC EXERCISES: CPT | Mod: GP

## 2024-10-02 PROCEDURE — 85652 RBC SED RATE AUTOMATED: CPT

## 2024-10-02 PROCEDURE — 36415 COLL VENOUS BLD VENIPUNCTURE: CPT

## 2024-10-02 ASSESSMENT — ENCOUNTER SYMPTOMS
ABDOMINAL PAIN: 0
SHORTNESS OF BREATH: 0

## 2024-10-02 ASSESSMENT — PAIN SCALES - GENERAL: PAINLEVEL_OUTOF10: 0 - NO PAIN

## 2024-10-02 ASSESSMENT — PAIN - FUNCTIONAL ASSESSMENT: PAIN_FUNCTIONAL_ASSESSMENT: 0-10

## 2024-10-02 NOTE — PROGRESS NOTES
Subjective   Patient ID: Tiki Gunter is a 59 y.o. female who presents for Joint Pain (NPV for Arthralgia).  HPI  Patient referred by Dr. Tang for joint symptoms.  She also has a history of hypertension, hypercholesterolemia.    She has been seen by Dr. Hernandez she has had previous left total hip replacement and had seen Dr. Hernandez for possible right hip arthroplasty.  She has also had a left total shoulder replacement.  She has had history of bunionectomy.  There is positive family history of rheumatoid arthritis.  She had been tested previously years ago.  She has a lot of fatigue and aches and pains.    She did have an injury in her 20s when she fell onto her left side.  She did not have any insurance at the time and did not seek medical help.  She did injure her left shoulder.  She used to do a lot of weight training but no longer.  She has a sedentary job.  She may walk her dogs.    She has a history of eczema and rosacea but no psoriasis.    Sometimes in the morning she wakes up with her arms tingling.  She has had right trigger thumb    She does have hypermobility.    Social history includes past tobacco use.  Expect 3-4 alcoholic beverages per week.  Has an office job.  Low  Review of Systems   HENT:  Negative for mouth sores.         No dry mouth   Eyes:         +dry eye, getting cataracs   Respiratory:  Negative for shortness of breath.    Cardiovascular:  Negative for chest pain.   Gastrointestinal:  Negative for abdominal pain.   Musculoskeletal:         Per HPI   Skin:  Positive for rash.       Objective   Physical Exam  GEN: NAD A&O x3 appropriate affect  HENT:no enlarged glands or thyroid  EYES:  no conjunctival redness, eyelids normal  SKIN: no rashes, ulcers, or subcutaneous nodules  PULSES: +radials  TENDER POINTS: 0/18   MSK:  Flexor tendon nodule right thumb  No synovitis DIP PIP MCP and no tenderness no swelling elbows left shoulder replacement no swelling in the knees decreased  range of motion of the right hip no swelling in the ankles midfoot or MTPs  Assessment/Plan        Reviewed her available radiology.  She does have significant degenerative change of the right hip.  No inflammatory arthritis seen on exam.  May just have osteoarthritis and no inflammatory arthropathy.  May have contributed from hypermobility arthralgia.  Discussed about strengthening around her joints.  Discussed about anti-inflammatory diet.  Will get blood work and discuss her results once they have returned.    Jimena Miller MD 10/02/24 1:09 PM

## 2024-10-02 NOTE — PROGRESS NOTES
Physical Therapy Treatment    Patient Name: Tiki Gunter  MRN: [id number]  Encounter date: 10/2/2024    Time Calculation  Start Time: 1530  Stop Time: 1612  Time Calculation (min): 42 min  PT Therapeutic Procedures Time Entry  Therapeutic Exercise Time Entry: 35  Gait Training Time Entry: 3    Visit # 6 of 10  Visits/Dates Authorized: NO AUTH / [place] VISITS    Current Problem:   Problem List Items Addressed This Visit             ICD-10-CM    Primary osteoarthritis, left shoulder M19.012    S/P shoulder replacement, left Z96.612       Surgery: L TSR  Surgery date: 4/19/24     Precautions:    4/19/24 L TSR, anatomical (not reverse), advance R hip OA (NGUYEN planned), GERD, aortic aneurysm        Subjective   General:   General Comment: Increased R hip pain since mid day upon turning over planted R foot. Limping heavily today. Does not have cane yet.    Pre-Treatment Symptoms:   Pain Assessment: 0-10  0-10 (Numeric) Pain Score: 0 - No pain (shoulder; Does have exac of R hip pain today)    Objective   Findings:    Poor scap stabilization but improving, winging of L scapula, exacerbated with light loading standing wall press out, L worse than R       Treatments:      Therapeutic Exercise:   Pulley flexion 3 min  Bodyblade arm dependent to fatigue 1 min  Bodyblade scaption to fatigue 1 min  Rhythmic stabilization ball at wall  Scap press out at wall from forearms  Tband serratus press blue  F.T. row 5# x30  F.T. shoulder ext 5# x30  F.T. adduction x  Tband ER peach x30  Bilat flexion/scaption/abduction raises in succession 1# 1x12 ea (fatigued)  F.T. Tricep ext 5# 25  F.T. IR 2.5# x25  F.T. upright row 5# 25    Deferred:   Superman   Prone Scapular Slide with Shoulder Extension    Prone Scapular Retraction Arms at Side  Prone Scapular Retraction Y   Supine serratus press 5# dowel x50  Sidelying Shoulder ER with Towel and Dumbbell    Seated Serratus Press with Anchored Resistance  Tband IR iso walk outs pink 1x5 (challenging)    Tband IR pink 2x10  Tband ER iso walk outs orange 1x10    S/L abduction/flexion/ER 2-3# 1x10 ea L only  Tband supine horiz abd pink 2x12  UBE L1 F/B 2/2 4 min   Self long axis R hip distraction 5# off step     Neuromuscular Re-Ed:     Therapeutic Activity:    Gait Training:   Instructed cane use    Manual Therapy:   Deferred:  Shoulder mobs inferior and posterior gr 2   Long axis R hip distraction    Modalities:     HEP/Access Codes  Access Code: [email or web address] Date: 09/19/2024  - Superman  - 1-2 x daily - 1-3 sets - 10-30 reps - 0-5 hold  - Prone Scapular Slide with Shoulder Extension  - 1-2 x daily - 1-3 sets - 10-30 reps - 0-5 hold  - Prone Scapular Retraction Arms at Side  - 1-2 x daily - 1-3 sets - 10-30 reps - 0-5 hold  - Prone Scapular Retraction Y  - 1-2 x daily - 1-3 sets - 10-30 reps - 0-5 hold  - Sidelying Shoulder ER with Towel and Dumbbell  - 1-2 x daily - 1-3 sets - 10-30 reps - 0-5 hold  - Seated Serratus Press with Anchored Resistance  - 1-2 x daily - 1-3 sets - 10-30 reps - 0-5 hold  - Serratus Forearm Push Up Plus at Wall with Elbows  - 1-2 x daily - 1-3 sets - 10-30 reps - 0-5 hold  Access Code: [email or web address] Date: 08/27/2024  - Shoulder extension with resistance - Neutral  - 1 x daily - 7 x weekly - 2-3 sets - 10-30 reps - 0-5 hold  - Scapular Retraction with Resistance  - 1 x daily - 7 x weekly - 2-3 sets - 10-30 reps - 0-5 hold  - Scaption with Resistance  - 1 x daily - 7 x weekly - 2-3 sets - 10-30 reps - 0-5 hold  - Standing Upright Shoulder Row with Resistance  - 1 x daily - 7 x weekly - 2-3 sets - 10-30 reps - 0-5 hold  - Walking Isometric Shoulder External Rotation with Anchored Resistance  - 1 x daily - 7 x weekly - 2-3 sets - 10-30 reps     Assessment:   PT Assessment  Assessment Comment: Pt with significant weakness of scapulothoracic stabilizers yielding winging L>R and scapula dyskinesia, further improved vs previously. Emphasizing scapular strength expected to benefit functional strength.  Able to tolerate L cane use for R hip pain.    Post-Treatment Symptoms:   Response to Interventions: shoulder mm fatigued, R hip pain better with cane    Plan:    Advance to ability for optimal functional strength  Therapy options: will be seen for skilled physical therapy services  Planned modality interventions: electrical stimulation/Russian stimulation and thermotherapy (hydrocollator packs)  Planned therapy interventions: manual therapy, postural training, strengthening, home exercise program, functional ROM exercises, flexibility and therapeutic activities  Frequency: 1x week  Duration in visits: 10  Treatment plan discussed with: patient    Goals:   Active       PT Problem       PT Goals       Start:  08/27/24    Expected End:  11/25/24       1) Indep HEP and progression.  2) SPADI < 10/130 to indicate improved function and pain, including dressing tasks, jaun bra.  3) Turn in bed without difficulty maneuvering L UE.  4) Stabilize jars/packages with L hand to open them with R hand.  5) Complete yard work without pain exacerbation.  6) Symmetrical shoulder ROM to complete daily tasks without pinching L posterior shoulder.         Patient Stated Goals       Start:  08/27/24    Expected End:  11/25/24       1) Strengthen shoulder for daily function and R NGUYEN.

## 2024-10-03 LAB
ANA PATTERN: ABNORMAL
ANA SER QL HEP2 SUBST: POSITIVE
ANA TITR SER IF: ABNORMAL {TITER}
CCP IGG SERPL-ACNC: <1 U/ML
CENTROMERE B AB SER-ACNC: <0.2 AI
CHROMATIN AB SERPL-ACNC: <0.2 AI
DSDNA AB SER-ACNC: <1 IU/ML
ENA JO1 AB SER QL IA: <0.2 AI
ENA RNP AB SER IA-ACNC: <0.2 AI
ENA SCL70 AB SER QL IA: <0.2 AI
ENA SM AB SER IA-ACNC: <0.2 AI
ENA SM+RNP AB SER QL IA: <0.2 AI
ENA SS-A AB SER IA-ACNC: <0.2 AI
ENA SS-B AB SER IA-ACNC: <0.2 AI
RHEUMATOID FACT SER NEPH-ACNC: <10 IU/ML (ref 0–15)
RIBOSOMAL P AB SER-ACNC: <0.2 AI

## 2024-10-07 DIAGNOSIS — R76.8 ANA POSITIVE: Primary | ICD-10-CM

## 2024-10-07 LAB — HLAB27 TYPING: NEGATIVE

## 2024-10-09 ENCOUNTER — TREATMENT (OUTPATIENT)
Dept: PHYSICAL THERAPY | Facility: CLINIC | Age: 59
End: 2024-10-09
Payer: COMMERCIAL

## 2024-10-09 DIAGNOSIS — Z96.612 S/P SHOULDER REPLACEMENT, LEFT: ICD-10-CM

## 2024-10-09 DIAGNOSIS — M19.012 PRIMARY OSTEOARTHRITIS, LEFT SHOULDER: ICD-10-CM

## 2024-10-09 PROCEDURE — 97110 THERAPEUTIC EXERCISES: CPT | Mod: GP

## 2024-10-09 ASSESSMENT — PAIN - FUNCTIONAL ASSESSMENT: PAIN_FUNCTIONAL_ASSESSMENT: 0-10

## 2024-10-09 ASSESSMENT — PAIN SCALES - GENERAL: PAINLEVEL_OUTOF10: 0 - NO PAIN

## 2024-10-09 NOTE — PROGRESS NOTES
Physical Therapy Treatment    Patient Name: Tiki Gunter  MRN: [id number]  Encounter date: 10/9/2024    Time Calculation  Start Time: 1300  Stop Time: 1345  Time Calculation (min): 45 min  PT Therapeutic Procedures Time Entry  Therapeutic Exercise Time Entry: 40    Visit # 7 of 10  Visits/Dates Authorized: NO AUTH / [place] VISITS    Current Problem:   Problem List Items Addressed This Visit             ICD-10-CM    Primary osteoarthritis, left shoulder M19.012    S/P shoulder replacement, left Z96.612       Surgery: L TSR  Surgery date: 4/19/24     Precautions:    4/19/24 L TSR, anatomical (not reverse), advance R hip OA (NGUYEN planned), GERD, aortic aneurysm        Subjective   General:   General Comment: Shoulder tired at times but managing extra exercises ok and now using cane L hand for R hip.    Pre-Treatment Symptoms:   Pain Assessment: 0-10  0-10 (Numeric) Pain Score: 0 - No pain    Objective   Findings:    Poor scap stabilization but improving, winging of L scapula, exacerbated with light loading standing wall press out, L worse than R       Treatments:      Therapeutic Exercise:   Pulley flexion 3 min  Bodyblade arm dependent to fatigue 1 min  Bodyblade scaption to fatigue 1 min  Rhythmic stabilization ball at wall too fatigued  Scap press out at wall from forearms x12  Tband serratus press blue  F.T. row 5# x30  F.T. shoulder ext 5# x36  F.T. adduction 5# x36  Tband ER peach x32  Bilat flexion/scaption/abduction raises in succession 1# 1x12 ea (fatigued)  F.T. Tricep ext 5# 30  F.T. IR 2.5# x17  F.T. upright row 5# 40  UBE L1 F/B 2/2 4 min     Deferred:   Superman   Prone Scapular Slide with Shoulder Extension    Prone Scapular Retraction Arms at Side  Prone Scapular Retraction Y   Supine serratus press 5# dowel x50  Sidelying Shoulder ER with Towel and Dumbbell    Seated Serratus Press with Anchored Resistance  Tband IR iso walk outs pink 1x5 (challenging)   Tband IR pink 2x10  Tband ER iso walk outs orange  1x10  S/L abduction/flexion/ER 2-3# 1x10 ea L only  Tband supine horiz abd pink 2x12  Self long axis R hip distraction 5# off step     Neuromuscular Re-Ed:     Therapeutic Activity:      Manual Therapy:   Deferred:  Shoulder mobs inferior and posterior gr 2   Long axis R hip distraction    Modalities:     HEP/Access Codes  Access Code: [email or web address] Date: 09/19/2024  - Superman  - 1-2 x daily - 1-3 sets - 10-30 reps - 0-5 hold  - Prone Scapular Slide with Shoulder Extension  - 1-2 x daily - 1-3 sets - 10-30 reps - 0-5 hold  - Prone Scapular Retraction Arms at Side  - 1-2 x daily - 1-3 sets - 10-30 reps - 0-5 hold  - Prone Scapular Retraction Y  - 1-2 x daily - 1-3 sets - 10-30 reps - 0-5 hold  - Sidelying Shoulder ER with Towel and Dumbbell  - 1-2 x daily - 1-3 sets - 10-30 reps - 0-5 hold  - Seated Serratus Press with Anchored Resistance  - 1-2 x daily - 1-3 sets - 10-30 reps - 0-5 hold  - Serratus Forearm Push Up Plus at Wall with Elbows  - 1-2 x daily - 1-3 sets - 10-30 reps - 0-5 hold  Access Code: [email or web address] Date: 08/27/2024  - Shoulder extension with resistance - Neutral  - 1 x daily - 7 x weekly - 2-3 sets - 10-30 reps - 0-5 hold  - Scapular Retraction with Resistance  - 1 x daily - 7 x weekly - 2-3 sets - 10-30 reps - 0-5 hold  - Scaption with Resistance  - 1 x daily - 7 x weekly - 2-3 sets - 10-30 reps - 0-5 hold  - Standing Upright Shoulder Row with Resistance  - 1 x daily - 7 x weekly - 2-3 sets - 10-30 reps - 0-5 hold  - Walking Isometric Shoulder External Rotation with Anchored Resistance  - 1 x daily - 7 x weekly - 2-3 sets - 10-30 reps     Assessment:   PT Assessment  Assessment Comment: Pt with significant weakness of scapulothoracic stabilizers yielding winging L>R and scapula dyskinesia, but continues to improve. Emphasizing scapular strength expected to benefit functional strength. Able to tolerate L cane use for R hip pain.    Post-Treatment Symptoms:   Response to Interventions: mm fatigue jaun serratus  anterior    Plan:    Advance to ability for optimal functional strength  Therapy options: will be seen for skilled physical therapy services  Planned modality interventions: electrical stimulation/Russian stimulation and thermotherapy (hydrocollator packs)  Planned therapy interventions: manual therapy, postural training, strengthening, home exercise program, functional ROM exercises, flexibility and therapeutic activities  Frequency: 1x week  Duration in visits: 10  Treatment plan discussed with: patient    Goals:   Active       PT Problem       PT Goals       Start:  08/27/24    Expected End:  11/25/24       1) Indep HEP and progression.  2) SPADI < 10/130 to indicate improved function and pain, including dressing tasks, jaun bra.  3) Turn in bed without difficulty maneuvering L UE.  4) Stabilize jars/packages with L hand to open them with R hand.  5) Complete yard work without pain exacerbation.  6) Symmetrical shoulder ROM to complete daily tasks without pinching L posterior shoulder.         Patient Stated Goals       Start:  08/27/24    Expected End:  11/25/24       1) Strengthen shoulder for daily function and R NGUYEN.

## 2024-10-18 ENCOUNTER — LAB (OUTPATIENT)
Dept: LAB | Facility: LAB | Age: 59
End: 2024-10-18
Payer: COMMERCIAL

## 2024-10-18 DIAGNOSIS — R76.8 ANA POSITIVE: ICD-10-CM

## 2024-10-18 PROCEDURE — 84481 FREE ASSAY (FT-3): CPT

## 2024-10-18 PROCEDURE — 86160 COMPLEMENT ANTIGEN: CPT

## 2024-10-18 PROCEDURE — 86376 MICROSOMAL ANTIBODY EACH: CPT

## 2024-10-18 PROCEDURE — 84443 ASSAY THYROID STIM HORMONE: CPT

## 2024-10-18 PROCEDURE — 85613 RUSSELL VIPER VENOM DILUTED: CPT

## 2024-10-18 PROCEDURE — 85730 THROMBOPLASTIN TIME PARTIAL: CPT

## 2024-10-18 PROCEDURE — 86147 CARDIOLIPIN ANTIBODY EA IG: CPT

## 2024-10-18 PROCEDURE — 84439 ASSAY OF FREE THYROXINE: CPT

## 2024-10-18 PROCEDURE — 36415 COLL VENOUS BLD VENIPUNCTURE: CPT

## 2024-10-18 PROCEDURE — 86146 BETA-2 GLYCOPROTEIN ANTIBODY: CPT

## 2024-10-19 LAB
B2 GLYCOPROT1 IGA SER-ACNC: <0.6 U/ML
B2 GLYCOPROT1 IGG SER-ACNC: <1.4 U/ML
B2 GLYCOPROT1 IGM SER-ACNC: 2.1 U/ML
C3 SERPL-MCNC: 145 MG/DL (ref 87–200)
C4 SERPL-MCNC: 31 MG/DL (ref 10–50)
CARDIOLIPIN IGA SERPL-ACNC: <0.5 APL U/ML
CARDIOLIPIN IGG SER IA-ACNC: <1.6 GPL U/ML
CARDIOLIPIN IGM SER IA-ACNC: 3.1 MPL U/ML
THYROPEROXIDASE AB SERPL-ACNC: >1000 IU/ML

## 2024-10-21 LAB
DRVVT SCREEN TO CONFIRM RATIO: 0.96 RATIO
DRVVT/DRVVT CFM NRMLZD PPP-RTO: 1.09 RATIO
DRVVT/DRVVT CFM P DOAC NEUT NORM PPP-RTO: 0.89 RATIO
LA 2 SCREEN W REFLEX-IMP: NORMAL
NORMALIZED SCT PPP-RTO: 0.82 RATIO
SILICA CLOTTING TIME CONFIRMATION: 1.4 RATIO
SILICA CLOTTING TIME SCREEN: 1.15 RATIO

## 2024-10-22 DIAGNOSIS — E06.3 CHRONIC AUTOIMMUNE THYROIDITIS: Primary | ICD-10-CM

## 2024-10-22 LAB
T3FREE SERPL-MCNC: 3.2 PG/ML (ref 2.3–4.2)
T4 FREE SERPL-MCNC: 0.98 NG/DL (ref 0.78–1.48)
TSH SERPL-ACNC: 2.27 MIU/L (ref 0.44–3.98)

## 2024-10-28 PROBLEM — M16.11 UNILATERAL PRIMARY OSTEOARTHRITIS, RIGHT HIP: Status: ACTIVE | Noted: 2024-10-27

## 2024-11-05 ENCOUNTER — TREATMENT (OUTPATIENT)
Dept: PHYSICAL THERAPY | Facility: CLINIC | Age: 59
End: 2024-11-05
Payer: COMMERCIAL

## 2024-11-05 DIAGNOSIS — Z96.612 S/P SHOULDER REPLACEMENT, LEFT: ICD-10-CM

## 2024-11-05 DIAGNOSIS — M19.012 PRIMARY OSTEOARTHRITIS, LEFT SHOULDER: ICD-10-CM

## 2024-11-05 PROCEDURE — 97110 THERAPEUTIC EXERCISES: CPT | Mod: GP

## 2024-11-05 ASSESSMENT — PAIN SCALES - GENERAL: PAINLEVEL_OUTOF10: 0 - NO PAIN

## 2024-11-05 ASSESSMENT — PAIN - FUNCTIONAL ASSESSMENT: PAIN_FUNCTIONAL_ASSESSMENT: 0-10

## 2024-11-05 NOTE — PROGRESS NOTES
Physical Therapy Treatment    Patient Name: Tiki Gunter  MRN: [id number]  Encounter date: 11/5/2024    Time Calculation  Start Time: 0745  Stop Time: 0835  Time Calculation (min): 50 min  PT Therapeutic Procedures Time Entry  Therapeutic Exercise Time Entry: 45    Visit # 8 of 10  Visits/Dates Authorized: NO AUTH / [place] VISITS    Current Problem:   Problem List Items Addressed This Visit             ICD-10-CM    Primary osteoarthritis, left shoulder M19.012    S/P shoulder replacement, left Z96.612       Surgery: L TSR  Surgery date: 4/19/24     Precautions:    4/19/24 L TSR, anatomical (not reverse), advance R hip OA (NGUYEN planned), GERD, aortic aneurysm        Subjective   General:   General Comment: Feeling ROM continues to be good, still aware of strength deficit L shoulder Vs R, e.g. stabilizing a jar, using cane for hip, etc. Variable consistency with HEP due to time constraints. Does have diffuse jt issues she's seeing rheumatology for.    Pre-Treatment Symptoms:   Pain Assessment: 0-10  0-10 (Numeric) Pain Score: 0 - No pain (does have R hip pain, NGUYEN 12/16/2024)  Pain Location: Shoulder    Objective   Findings:    Limited scap stabilization but improving     Treatments:      Therapeutic Exercise: intermittent vc/tc for posture/technique  UBE seat 1: L3 5min   Scap press out at wall from forearms x15  Tband IR iso walk outs pink 1x5 (challenging)   Tband IR pink 2x10  Tband ER iso walk outs orange 1x10  Tband supine horiz abd pink 2x12  Tband scaption peach x15  S/L abduction 3# x30  S/L ER 5# x6, # 4x5 L only    Deferred:   Bodyblade arm dependent to fatigue 1 min  Bodyblade scaption to fatigue 1 min  Superman   Prone Scapular Slide with Shoulder Extension    Prone Scapular Retraction Arms at Side  Prone Scapular Retraction Y   Supine serratus press 5# dowel x50  Sidelying Shoulder ER with Towel and Dumbbell    Seated Serratus Press with Anchored Resistance  Rhythmic stabilization supine  Self long axis R  hip distraction 5# off step   Tband serratus press blue  F.T. row 5# x30  F.T. shoulder ext 5# x36  F.T. adduction 5# x36  Tband ER peach x32  Bilat flexion/scaption/abduction raises in succession 1# 1x12 ea (fatigued)  F.T. Tricep ext 5# 30  F.T. IR 2.5# x17  F.T. upright row 5# 40  Pulleys: discontinued due to full AROM    Neuromuscular Re-Ed:     Therapeutic Activity:      Manual Therapy:       Modalities:     HEP/Access Codes  Access Code: [email or web address] Date: 09/19/2024  - Superman  - 1-2 x daily - 1-3 sets - 10-30 reps - 0-5 hold  - Prone Scapular Slide with Shoulder Extension  - 1-2 x daily - 1-3 sets - 10-30 reps - 0-5 hold  - Prone Scapular Retraction Arms at Side  - 1-2 x daily - 1-3 sets - 10-30 reps - 0-5 hold  - Prone Scapular Retraction Y  - 1-2 x daily - 1-3 sets - 10-30 reps - 0-5 hold  - Sidelying Shoulder ER with Towel and Dumbbell  - 1-2 x daily - 1-3 sets - 10-30 reps - 0-5 hold  - Seated Serratus Press with Anchored Resistance  - 1-2 x daily - 1-3 sets - 10-30 reps - 0-5 hold  - Serratus Forearm Push Up Plus at Wall with Elbows  - 1-2 x daily - 1-3 sets - 10-30 reps - 0-5 hold  Access Code: [email or web address] Date: 08/27/2024  - Shoulder extension with resistance - Neutral  - 1 x daily - 7 x weekly - 2-3 sets - 10-30 reps - 0-5 hold  - Scapular Retraction with Resistance  - 1 x daily - 7 x weekly - 2-3 sets - 10-30 reps - 0-5 hold  - Scaption with Resistance  - 1 x daily - 7 x weekly - 2-3 sets - 10-30 reps - 0-5 hold  - Standing Upright Shoulder Row with Resistance  - 1 x daily - 7 x weekly - 2-3 sets - 10-30 reps - 0-5 hold  - Walking Isometric Shoulder External Rotation with Anchored Resistance  - 1 x daily - 7 x weekly - 2-3 sets - 10-30 reps     Assessment:   PT Assessment  Assessment Comment: Pt with weakness of scapulothoracic stabilizers yielding winging L>R and scapula dyskinesia, but continues to improve. Emphasizing scapular strength expected to benefit functional strength.    Post-Treatment  Symptoms:   Response to Interventions: mm fatigue L shoulder, no pain onset    Plan:    Advance to ability for optimal functional strength  Therapy options: will be seen for skilled physical therapy services  Planned modality interventions: electrical stimulation/Russian stimulation and thermotherapy (hydrocollator packs)  Planned therapy interventions: manual therapy, postural training, strengthening, home exercise program, functional ROM exercises, flexibility and therapeutic activities  Frequency: 1x week  Duration in visits: 10  Treatment plan discussed with: patient    Goals:   Active       PT Problem       PT Goals       Start:  08/27/24    Expected End:  11/25/24       1) Indep HEP and progression.  2) SPADI < 10/130 to indicate improved function and pain, including dressing tasks, jaun bra.  3) Turn in bed without difficulty maneuvering L UE.  4) Stabilize jars/packages with L hand to open them with R hand.  5) Complete yard work without pain exacerbation.  6) Symmetrical shoulder ROM to complete daily tasks without pinching L posterior shoulder.         Patient Stated Goals       Start:  08/27/24    Expected End:  11/25/24       1) Strengthen shoulder for daily function and R NGUYEN.

## 2024-11-14 ENCOUNTER — OFFICE VISIT (OUTPATIENT)
Dept: ORTHOPEDIC SURGERY | Facility: CLINIC | Age: 59
End: 2024-11-14
Payer: COMMERCIAL

## 2024-11-14 DIAGNOSIS — M16.11 PRIMARY OSTEOARTHRITIS OF RIGHT HIP: Primary | ICD-10-CM

## 2024-11-14 PROCEDURE — 99214 OFFICE O/P EST MOD 30 MIN: CPT | Performed by: ORTHOPAEDIC SURGERY

## 2024-11-14 PROCEDURE — 1036F TOBACCO NON-USER: CPT | Performed by: ORTHOPAEDIC SURGERY

## 2024-11-14 NOTE — PROGRESS NOTES
Patient is a 59-year-old female presents today for discussion having decided to proceed with right total hip arthroplasty.  She is failed a greater than 3-month trial of reasonable conservative treat including ibuprofen, Tylenol and using a cane.  She has difficulty walking sort of distance or putting on her shoes and socks.  She rates her pain as 8 out of 10.  She previously underwent left total hip arthroplasty by another surgeon.  She would like to proceed with surgery which is indicated at this time.    Right hip:    AAOx3, NAD, walks with a moderate antalgic gait  Significant pain with flexion internal rotation  Positive Stinchfield  Mild TTP over trochanter laterally  5/5 Hip flexion/knee extension/df/pf/ehl  SILT in a santa/saph/per/tib distribution  ½ dorsalis pedis and posterior tibial pulse  no popliteal or inguinal lymphadenopathy  no other overlying lesions  mood: euthymic  Respirations non    Plain films were reviewed by myself in clinic today.  They have advanced osteoarthritis of their hip with significant joint space narrowing, bone on bone changes, underlying sclerosis and bipolar osteophytic change.    Patient is now failed a greater than 3-month trial of reasonable conservative treatment and wishes to proceed with total hip arthroplasty which is indicated at this time.  We discussed the risk benefits alternatives to surgery.  All of their questions were answered.    Procedure: right total hip  Location: [facility] [place]  ICD10: M16.11  CPT: 53341  Stay: outpatient  Equipment: [employer]    I talked with the patient at length about risks, limitations, benefits and alternatives to total hip replacement today. I reviewed concerns about implant wear, loosening, breakage, infection, infection prophylaxis, DVT, PE, death and other medical and anesthetic complications of surgery. We talked about the potential for persistent pain following surgery since there are many possible causes for hip and leg pain. The  patient was advised that hip replacement will only relieve pain that is coming from the hip. We talked about leg length discrepancy, neurovascular problems and dislocation after surgery. The patient understands that we may have to lengthen the leg slightly to provide for adequate stability of the hip. I reviewed dislocation precautions and activity restrictions in detail. We discussed the concerns about intraoperative fracture, ingrowth failure, thigh pain and possible post-operative weight bearing restrictions following cementless hip replacement. We discussed advantages and disadvantages of different surgical approaches. We discussed the possible need for a homologous blood transfusion. We discussed the fact that many of our patients are able to go home in 1 day or less depending on their health, mobility, pre-op preparation, individual home situation and personal preference. The patient has identified their personal goals of their joint replacement surgery and recovery and we have discussed them. These are documented in our [employer] patient engagement platform. In addition, we have discussed the advantages and disadvantages of various implant and fixation options, as well as various surgical approaches.  The basic concepts of the joint replacement procedure has been reviewed with the patient and the patient has been provided the opportunity to see an actual implant either in the office or in our pre-op education class. All of the patients questions were answered. The patient can call my office to schedule surgery and the pre-op teaching class. I told the patient that they should contact their primary care physician to discuss fitness for surgery.     This note was created using voice recognition software and was not corrected for typographical or grammatical errors.

## 2024-11-18 ENCOUNTER — TREATMENT (OUTPATIENT)
Dept: PHYSICAL THERAPY | Facility: CLINIC | Age: 59
End: 2024-11-18
Payer: COMMERCIAL

## 2024-11-18 DIAGNOSIS — M19.012 PRIMARY OSTEOARTHRITIS, LEFT SHOULDER: ICD-10-CM

## 2024-11-18 DIAGNOSIS — Z96.612 S/P SHOULDER REPLACEMENT, LEFT: ICD-10-CM

## 2024-11-18 PROCEDURE — 97140 MANUAL THERAPY 1/> REGIONS: CPT | Mod: GP

## 2024-11-18 PROCEDURE — 97110 THERAPEUTIC EXERCISES: CPT | Mod: GP

## 2024-11-18 ASSESSMENT — PAIN - FUNCTIONAL ASSESSMENT: PAIN_FUNCTIONAL_ASSESSMENT: 0-10

## 2024-11-18 NOTE — PROGRESS NOTES
Physical Therapy Treatment    Patient Name: Tiki Gunter  MRN: [id number]  Encounter date: 11/18/2024    Time Calculation  Start Time: 1530  Stop Time: 1620  Time Calculation (min): 50 min  PT Therapeutic Procedures Time Entry  Manual Therapy Time Entry: 35  Therapeutic Exercise Time Entry: 5    Visit # 9 of 10  Visits/Dates Authorized: NO AUTH / [place] VISITS    Current Problem:   Problem List Items Addressed This Visit             ICD-10-CM    Primary osteoarthritis, left shoulder M19.012    S/P shoulder replacement, left Z96.612       Surgery: L TSR  Surgery date: 4/19/24     Precautions:    4/19/24 L TSR, anatomical (not reverse), advance R hip OA (NGUYEN planned), GERD, aortic aneurysm        Subjective   General:   General Comment: Using cane has helped shoulder strength, difficult to be consistent with HEP regularly, prepping for R THR in 4 weeks. Moved a couch to make room for a 1st floor bed and hurt her back, feels pinched and harder to walk today even with cane.    Pre-Treatment Symptoms:   Pain Assessment: 0-10  0-10 (Numeric) Pain Score:  (no L shoulder pain, moderate LBP, R hip pain)    Objective   Findings:    Limited scap stabilization but improving endurance       Treatments:      Therapeutic Exercise: intermittent vc/tc for posture/technique, reviewed HEP  UBE seat 1: L3 5min   Scap press out at wall from forearms x25    Deferred:   Tband IR iso walk outs pink 1x5 (challenging)   Tband IR pink 2x10  Tband ER iso walk outs orange 1x10  Tband supine horiz abd pink 2x12  Tband scaption peach x15  S/L abduction 3# x30  S/L ER 5# x6, # 4x5 L only  Bodyblade arm dependent to fatigue 1 min  Bodyblade scaption to fatigue 1 min  Superman   Prone Scapular Slide with Shoulder Extension    Prone Scapular Retraction Arms at Side  Prone Scapular Retraction Y   Supine serratus press 5# dowel x50  Sidelying Shoulder ER with Towel and Dumbbell    Seated Serratus Press with Anchored Resistance  Rhythmic stabilization  supine  Self long axis R hip distraction 5# off step   Tband serratus press blue  F.T. row 5# x30  F.T. shoulder ext 5# x36  F.T. adduction 5# x36  Tband ER peach x32  Bilat flexion/scaption/abduction raises in succession 1# 1x12 ea (fatigued)  F.T. Tricep ext 5# 30  F.T. IR 2.5# x17  F.T. upright row 5# 40  Pulleys: discontinued due to full AROM    Neuromuscular Re-Ed:     Therapeutic Activity:      Manual Therapy:   STM/trigger point releases for tension    Modalities:     HEP/Access Codes  Access Code: [email or web address] Date: 09/19/2024  - Superman  - 1-2 x daily - 1-3 sets - 10-30 reps - 0-5 hold  - Prone Scapular Slide with Shoulder Extension  - 1-2 x daily - 1-3 sets - 10-30 reps - 0-5 hold  - Prone Scapular Retraction Arms at Side  - 1-2 x daily - 1-3 sets - 10-30 reps - 0-5 hold  - Prone Scapular Retraction Y  - 1-2 x daily - 1-3 sets - 10-30 reps - 0-5 hold  - Sidelying Shoulder ER with Towel and Dumbbell  - 1-2 x daily - 1-3 sets - 10-30 reps - 0-5 hold  - Seated Serratus Press with Anchored Resistance  - 1-2 x daily - 1-3 sets - 10-30 reps - 0-5 hold  - Serratus Forearm Push Up Plus at Wall with Elbows  - 1-2 x daily - 1-3 sets - 10-30 reps - 0-5 hold  Access Code: [email or web address] Date: 08/27/2024  - Shoulder extension with resistance - Neutral  - 1 x daily - 7 x weekly - 2-3 sets - 10-30 reps - 0-5 hold  - Scapular Retraction with Resistance  - 1 x daily - 7 x weekly - 2-3 sets - 10-30 reps - 0-5 hold  - Scaption with Resistance  - 1 x daily - 7 x weekly - 2-3 sets - 10-30 reps - 0-5 hold  - Standing Upright Shoulder Row with Resistance  - 1 x daily - 7 x weekly - 2-3 sets - 10-30 reps - 0-5 hold  - Walking Isometric Shoulder External Rotation with Anchored Resistance  - 1 x daily - 7 x weekly - 2-3 sets - 10-30 reps     Assessment:   PT Assessment  Assessment Comment: Co-morbidities are greater issue than L shoulder presently, having more confidence in L shoulder for using walker post op R THR in 4 wks. Increased  stamina of scapulothoracic stabilizers. 1 more visit before back surgery    Post-Treatment Symptoms:   Response to Interventions: Decrease in pain      Plan:    Advance to ability for optimal functional strength  Therapy options: will be seen for skilled physical therapy services  Planned modality interventions: electrical stimulation/Russian stimulation and thermotherapy (hydrocollator packs)  Planned therapy interventions: manual therapy, postural training, strengthening, home exercise program, functional ROM exercises, flexibility and therapeutic activities  Frequency: 1x week  Duration in visits: 10  Treatment plan discussed with: patient    Goals:   Active       PT Problem       PT Goals       Start:  08/27/24    Expected End:  11/25/24       1) Indep HEP and progression.  2) SPADI < 10/130 to indicate improved function and pain, including dressing tasks, jaun bra.  3) Turn in bed without difficulty maneuvering L UE.  4) Stabilize jars/packages with L hand to open them with R hand.  5) Complete yard work without pain exacerbation.  6) Symmetrical shoulder ROM to complete daily tasks without pinching L posterior shoulder.         Patient Stated Goals       Start:  08/27/24    Expected End:  11/25/24       1) Strengthen shoulder for daily function and R NGUYEN.

## 2024-11-25 ENCOUNTER — HOSPITAL ENCOUNTER (OUTPATIENT)
Dept: RADIOLOGY | Facility: HOSPITAL | Age: 59
Discharge: HOME | End: 2024-11-25
Payer: COMMERCIAL

## 2024-11-25 ENCOUNTER — PHARMACY VISIT (OUTPATIENT)
Dept: PHARMACY | Facility: CLINIC | Age: 59
End: 2024-11-25
Payer: MEDICARE

## 2024-11-25 ENCOUNTER — EDUCATION (OUTPATIENT)
Dept: ORTHOPEDIC SURGERY | Facility: HOSPITAL | Age: 59
End: 2024-11-25
Payer: COMMERCIAL

## 2024-11-25 ENCOUNTER — PRE-ADMISSION TESTING (OUTPATIENT)
Dept: PREADMISSION TESTING | Facility: HOSPITAL | Age: 59
End: 2024-11-25
Payer: COMMERCIAL

## 2024-11-25 VITALS
OXYGEN SATURATION: 100 % | TEMPERATURE: 98 F | WEIGHT: 159.17 LBS | SYSTOLIC BLOOD PRESSURE: 154 MMHG | RESPIRATION RATE: 14 BRPM | HEIGHT: 64 IN | BODY MASS INDEX: 27.17 KG/M2 | DIASTOLIC BLOOD PRESSURE: 96 MMHG | HEART RATE: 73 BPM

## 2024-11-25 DIAGNOSIS — M16.11 UNILATERAL PRIMARY OSTEOARTHRITIS, RIGHT HIP: ICD-10-CM

## 2024-11-25 DIAGNOSIS — Z01.818 PREOP TESTING: Primary | ICD-10-CM

## 2024-11-25 LAB
ALBUMIN SERPL BCP-MCNC: 4.8 G/DL (ref 3.4–5)
ALP SERPL-CCNC: 80 U/L (ref 33–110)
ALT SERPL W P-5'-P-CCNC: 13 U/L (ref 7–45)
ANION GAP SERPL CALC-SCNC: 14 MMOL/L (ref 10–20)
AST SERPL W P-5'-P-CCNC: 25 U/L (ref 9–39)
ATRIAL RATE: 70 BPM
BASOPHILS # BLD AUTO: 0.05 X10*3/UL (ref 0–0.1)
BASOPHILS NFR BLD AUTO: 1.1 %
BILIRUB SERPL-MCNC: 0.5 MG/DL (ref 0–1.2)
BUN SERPL-MCNC: 17 MG/DL (ref 6–23)
CALCIUM SERPL-MCNC: 10 MG/DL (ref 8.6–10.3)
CHLORIDE SERPL-SCNC: 100 MMOL/L (ref 98–107)
CO2 SERPL-SCNC: 27 MMOL/L (ref 21–32)
CREAT SERPL-MCNC: 0.68 MG/DL (ref 0.5–1.05)
EGFRCR SERPLBLD CKD-EPI 2021: >90 ML/MIN/1.73M*2
EOSINOPHIL # BLD AUTO: 0.15 X10*3/UL (ref 0–0.7)
EOSINOPHIL NFR BLD AUTO: 3.3 %
ERYTHROCYTE [DISTWIDTH] IN BLOOD BY AUTOMATED COUNT: 17.9 % (ref 11.5–14.5)
EST. AVERAGE GLUCOSE BLD GHB EST-MCNC: 111 MG/DL
GLUCOSE SERPL-MCNC: 88 MG/DL (ref 74–99)
HBA1C MFR BLD: 5.5 %
HCT VFR BLD AUTO: 36.4 % (ref 36–46)
HGB BLD-MCNC: 11.3 G/DL (ref 12–16)
IMM GRANULOCYTES # BLD AUTO: 0 X10*3/UL (ref 0–0.7)
IMM GRANULOCYTES NFR BLD AUTO: 0 % (ref 0–0.9)
LYMPHOCYTES # BLD AUTO: 1.71 X10*3/UL (ref 1.2–4.8)
LYMPHOCYTES NFR BLD AUTO: 37.3 %
MCH RBC QN AUTO: 26.8 PG (ref 26–34)
MCHC RBC AUTO-ENTMCNC: 31 G/DL (ref 32–36)
MCV RBC AUTO: 86 FL (ref 80–100)
MONOCYTES # BLD AUTO: 0.31 X10*3/UL (ref 0.1–1)
MONOCYTES NFR BLD AUTO: 6.8 %
NEUTROPHILS # BLD AUTO: 2.37 X10*3/UL (ref 1.2–7.7)
NEUTROPHILS NFR BLD AUTO: 51.5 %
NRBC BLD-RTO: ABNORMAL /100{WBCS}
P AXIS: 23 DEGREES
P OFFSET: 189 MS
P ONSET: 141 MS
PLATELET # BLD AUTO: 388 X10*3/UL (ref 150–450)
POTASSIUM SERPL-SCNC: 3.5 MMOL/L (ref 3.5–5.3)
PR INTERVAL: 158 MS
PROT SERPL-MCNC: 7.8 G/DL (ref 6.4–8.2)
Q ONSET: 220 MS
QRS COUNT: 11 BEATS
QRS DURATION: 94 MS
QT INTERVAL: 388 MS
QTC CALCULATION(BAZETT): 419 MS
QTC FREDERICIA: 408 MS
R AXIS: -6 DEGREES
RBC # BLD AUTO: 4.22 X10*6/UL (ref 4–5.2)
SODIUM SERPL-SCNC: 137 MMOL/L (ref 136–145)
T AXIS: -6 DEGREES
T OFFSET: 414 MS
VENTRICULAR RATE: 70 BPM
WBC # BLD AUTO: 4.6 X10*3/UL (ref 4.4–11.3)

## 2024-11-25 PROCEDURE — 87081 CULTURE SCREEN ONLY: CPT | Mod: BEALAB

## 2024-11-25 PROCEDURE — 80053 COMPREHEN METABOLIC PANEL: CPT

## 2024-11-25 PROCEDURE — 93005 ELECTROCARDIOGRAM TRACING: CPT

## 2024-11-25 PROCEDURE — 83036 HEMOGLOBIN GLYCOSYLATED A1C: CPT | Mod: BEALAB

## 2024-11-25 PROCEDURE — 85025 COMPLETE CBC W/AUTO DIFF WBC: CPT

## 2024-11-25 PROCEDURE — 36415 COLL VENOUS BLD VENIPUNCTURE: CPT

## 2024-11-25 PROCEDURE — RXMED WILLOW AMBULATORY MEDICATION CHARGE

## 2024-11-25 PROCEDURE — 73502 X-RAY EXAM HIP UNI 2-3 VIEWS: CPT | Mod: RT

## 2024-11-25 PROCEDURE — 93010 ELECTROCARDIOGRAM REPORT: CPT | Performed by: INTERNAL MEDICINE

## 2024-11-25 RX ORDER — CHLORHEXIDINE GLUCONATE ORAL RINSE 1.2 MG/ML
15 SOLUTION DENTAL DAILY
Qty: 473 ML | Refills: 0 | Status: SHIPPED | OUTPATIENT
Start: 2024-11-25 | End: 2024-12-26

## 2024-11-25 RX ORDER — CETIRIZINE HYDROCHLORIDE 10 MG/1
10 TABLET, CHEWABLE ORAL AS NEEDED
COMMUNITY

## 2024-11-25 RX ORDER — CHLORHEXIDINE GLUCONATE 40 MG/ML
SOLUTION TOPICAL DAILY
Qty: 473 ML | Refills: 0 | Status: SHIPPED | OUTPATIENT
Start: 2024-11-25 | End: 2024-11-30

## 2024-11-25 ASSESSMENT — DUKE ACTIVITY SCORE INDEX (DASI)
CAN YOU DO LIGHT WORK AROUND THE HOUSE LIKE DUSTING OR WASHING DISHES: YES
CAN YOU CLIMB A FLIGHT OF STAIRS OR WALK UP A HILL: YES
CAN YOU HAVE SEXUAL RELATIONS: YES
CAN YOU TAKE CARE OF YOURSELF (EAT, DRESS, BATHE, OR USE TOILET): YES
TOTAL_SCORE: 42.7
CAN YOU DO YARD WORK LIKE RAKING LEAVES, WEEDING OR PUSHING A MOWER: YES
CAN YOU DO HEAVY WORK AROUND THE HOUSE LIKE SCRUBBING FLOORS OR LIFTING AND MOVING HEAVY FURNITURE: YES
CAN YOU PARTICIPATE IN STRENOUS SPORTS LIKE SWIMMING, SINGLES TENNIS, FOOTBALL, BASKETBALL, OR SKIING: NO
CAN YOU RUN A SHORT DISTANCE: NO
CAN YOU PARTICIPATE IN MODERATE RECREATIONAL ACTIVITIES LIKE GOLF, BOWLING, DANCING, DOUBLES TENNIS OR THROWING A BASEBALL OR FOOTBALL: YES
CAN YOU DO MODERATE WORK AROUND THE HOUSE LIKE VACUUMING, SWEEPING FLOORS OR CARRYING GROCERIES: YES
CAN YOU WALK INDOORS, SUCH AS AROUND YOUR HOUSE: YES
CAN YOU WALK A BLOCK OR TWO ON LEVEL GROUND: YES
DASI METS SCORE: 8

## 2024-11-25 ASSESSMENT — PAIN SCALES - GENERAL: PAINLEVEL_OUTOF10: 4

## 2024-11-25 ASSESSMENT — PAIN DESCRIPTION - DESCRIPTORS: DESCRIPTORS: ACHING

## 2024-11-25 NOTE — PREPROCEDURE INSTRUCTIONS
Medication List            Accurate as of November 25, 2024 11:46 AM. Always use your most recent med list.                acetaminophen 500 mg tablet  Commonly known as: Tylenol  Medication Adjustments for Surgery: Take/Use as prescribed     amLODIPine 5 mg tablet  Commonly known as: Norvasc  Take 1 tablet (5 mg) by mouth once daily.  Medication Adjustments for Surgery: Take/Use as prescribed     cetirizine 10 mg chewable tablet  Commonly known as: ZyrTEC  Medication Adjustments for Surgery: Take last dose 1 day (24 hours) before surgery  Notes to patient: Last dose preoperatively 12/15/2024     * chlorhexidine 4 % external liquid  Commonly known as: Hibiclens  Apply topically once daily for 5 days.  Medication Adjustments for Surgery: Take/Use as prescribed     * chlorhexidine 0.12 % solution  Commonly known as: Peridex  Use 15 mL in the mouth or throat once daily for 2 doses. 15 ML night before surgery and 15 ML morning of surgery. Swish and spit  Medication Adjustments for Surgery: Take/Use as prescribed     cholecalciferol 125 mcg (5000 UT) capsule  Commonly known as: Vitamin D-3  Additional Medication Adjustments for Surgery: Take last dose 7 days before surgery  Notes to patient: Last dose preoperatively 12/08/2024     EpiCeram lotion  Generic drug: emollient combination no.32  Medication Adjustments for Surgery: Do Not take on the morning of surgery  Notes to patient: Last dose preoperatively 12/15/2024     ezetimibe 10 mg tablet  Commonly known as: Zetia  Take 1 tablet (10 mg) by mouth once daily.  Medication Adjustments for Surgery: Take last dose 1 day (24 hours) before surgery  Notes to patient: Last dose preoperatively 12/15/2024     ibuprofen 200 mg tablet  Additional Medication Adjustments for Surgery: Take last dose 7 days before surgery  Notes to patient: Last dose preoperatively 12/08/2024     magnesium oxide 400 mg tablet  Commonly known as: Mag-Ox  Medication Adjustments for Surgery: Take/Use  as prescribed     omeprazole 20 mg DR capsule  Commonly known as: PriLOSEC  Medication Adjustments for Surgery: Take/Use as prescribed     penicillin v potassium 500 mg tablet  Commonly known as: Veetid  Medication Adjustments for Surgery: Take/Use as prescribed     Vitamin C 1,000 mg tablet  Generic drug: ascorbic acid  Notes to patient: Last dose preoperatively 12/08/2024           * This list has 2 medication(s) that are the same as other medications prescribed for you. Read the directions carefully, and ask your doctor or other care provider to review them with you.                NPO Instructions:     Do not eat any food after midnight the night before your surgery/procedure.  You may have clear liquids until TWO hours before surgery/procedure. This includes water, black tea/coffee, (no milk or cream) apple juice and electrolyte drinks (Gatorade).  You may chew gum up to TWO hours before your surgery/procedure.     Additional Instructions:      Seven/Six Days before Surgery:  Review your medication instructions, stop indicated medications  Five Days before Surgery:  Review your medication instructions, stop indicated medications  Begin using your Hibiclens  Three Days before Surgery:  Review your medication instructions, stop indicated medications  The Day before Surgery:  Start using 0.12% CHG mouthwash  No smoking or alcohol use 24 hours before surgery  Review your medication instructions, stop indicated medications  You will be contacted regarding the time of your arrival to facility and surgery time  Do not eat any food after Midnight  Day of Surgery:  Review your medication instructions, take indicated medications  If you have diabetes, please check your fasting blood sugar upon awakening.  If fasting blood sugar is <80 mg/dl, drink 100 ml of apple juice, time limit of 2 hours before  You may have clear liquids until TWO hours before surgery/procedure.  This includes water, black tea/coffee, (no milk or  cream) apple juice and electrolyte drinks (Gatorade)  You may chew gum up to TWO hours before your surgery/procedure  Wear  comfortable loose fitting clothing  Do not use moisturizers, creams, lotions or perfume  All jewelry and valuables should be left at home     CONTACT SURGEON'S OFFICE IF YOU DEVELOP:  * Fever = 100.4 F   * New respiratory symptoms (e.g. cough, shortness of breath, respiratory distress, sore throat)  * Recent loss of taste or smell  *Flu like symptoms such as headache, fatigue or gastrointestinal symptoms  * You develop any open sores, shingles, burning or painful urination   AND/OR:  * You no longer wish to have the surgery.  * Any other personal circumstances change that may lead to the need to cancel or defer this surgery.  *You were admitted to any hospital within one week of your planned procedure.     SMOKING:  *Quitting smoking can make a huge difference to your health and recovery from surgery.    *If you need help with quitting, call 6-956-QUIT-NOW.     THE DAY BEFORE SURGERY:  *Do not eat any food after midnight the night before your surgery.   *You may have up to TEN OUNCES of clear liquids until TWO hours before your instructed ARRIVAL TIME to hospital. This includes water, black tea/coffee, (no milk or cream) apple juice, clear broth and electrolyte drinks (Gatorade). Please avoid clear liquids that are red in color.   *You may chew gum/mints up to TWO hours before your surgery/procedure.     SURGICAL TIME:  *You will be contacted between 2 p.m. and 3 p.m. the business day before your surgery with your arrival time.  *If you haven't received a call by 3pm, call [phone]  *Scheduled surgery times may change and you will be notified if this occurs-check your personal voicemail for any updates.     ON THE MORNING OF SURGERY:  *Wear comfortable, loose fitting clothing.   *Do not use moisturizers, creams, lotions or perfume.  *All jewelry and valuables should be left at  home.  *Prosthetic devices such as contact lenses, hearing aids, dentures, eyelash extensions, hairpins and body piercing must be removed before surgery.     BRING WITH YOU:  *Photo ID and insurance card  *Current list of medications and allergies  *Pacemaker/Defibrillator/Heart stent cards  *CPAP machine and mask  *Slings/splints/crutches  *Copy of your complete Advanced Directive/DHPOA-if applicable  *Neurostimulator implant remote     PARKING AND ARRIVAL:  *Check in at the Main Entrance desk and let them know you are here for surgery.     IF YOU ARE HAVING OUTPATIENT/SAME DAY SURGERY:  *A responsible adult MUST accompany you at the time of discharge and stay with you for 24 hours after your surgery.  *You may NOT drive yourself home after surgery.  *You may use a taxi or ride sharing service ([employer], Uber) to return home ONLY if you are accompanied by a friend or family member.  *Instructions for resuming your medications will be provided by your surgeon.     Thank you for coming to Pre Admission testing.      If I have prescribed medication please don't forget to  at your pharmacy.      Any questions about today's visit call [phone] and leave a message in the general mailbox.     Patient instructed to ambulate as soon as possible postoperatively to decrease thromboembolic risk.     Terry Nava, APRN-CNP     Thank you for visiting [facility].  If you have any changes to your health condition, please call the surgeons office to alert them and give them details of your symptoms.        Preoperative Fasting Guidelines     Why must I stop eating and drinking near surgery time?  With sedation, food or liquid in your stomach can enter your lungs causing serious complications  Increases nausea and vomiting     When do I need to stop eating and drinking before my surgery?  Do not eat any food after midnight the night before your surgery/procedure.  You may have up to TEN ounces of  clear liquid until TWO hours before your instructed arrival time to the hospital.  This includes water, black tea/coffee, (no milk or cream) apple juice, and electrolyte drinks (Gatorade)  You may chew gum until TWO hours before your surgery/procedure        Additional Instructions:      The Day before Surgery:  -Review your medication instructions, stop indicated medications  -You will be contacted in the evening regarding the time of your arrival to facility and surgery time     Day of Surgery:  -Review your medication instructions, take indicated medications  -Wear comfortable loose fitting clothing  -Do not use moisturizers, creams, lotions or perfume  -All jewelry and valuables should be left at home                   Preoperative Brain Exercises     What are brain exercises?  A brain exercise is any activity that engages your thinking (cognitive) skills.     What types of activities are considered brain exercises?  Jigsaw puzzles, crossword puzzles, word jumble, memory games, word search, and many more.  Many can be found free online or on your phone via a mobile gail.     Why should I do brain exercises before my surgery?  More recent research has shown brain exercise before surgery can lower the risk of postoperative delirium (confusion) which can be especially important for older adults.  Patients who did brain exercises for 5 to 10 hours the days before surgery, cut their risk of postoperative delirium in half up to 1 week after surgery.                         [facility]     Preoperative Deep Breathing Exercises     Why it is important to do deep breathing exercises before my surgery?  Deep breathing exercises strengthen your breathing muscles.  This helps you to recover after your surgery and decreases the chance of breathing complications.        How are the deep breathing exercises done?  Sit straight with your back supported.  Breathe in deeply and slowly through your nose.  Your lower rib cage should expand and your abdomen may move forward.  Hold that breath for 3 to 5 seconds.  Breathe out through pursed lips, slowly and completely.  Rest and repeat 10 times every hour while awake.  Rest longer if you become dizzy or lightheaded.                      [facility]     Preoperative Deep Breathing Exercises     Why it is important to do deep breathing exercises before my surgery?  Deep breathing exercises strengthen your breathing muscles.  This helps you to recover after your surgery and decreases the chance of breathing complications.        How are the deep breathing exercises done?  Sit straight with your back supported.  Breathe in deeply and slowly through your nose. Your lower rib cage should expand and your abdomen may move forward.  Hold that breath for 3 to 5 seconds.  Breathe out through pursed lips, slowly and completely.  Rest and repeat 10 times every hour while awake.  Rest longer if you become dizzy or lightheaded.        Patient Information: Incentive Spirometer  What is an incentive spirometer?  An incentive spirometer is a device used before and after surgery to “exercise” your lungs.  It helps you to take deeper breaths to expand your lungs.  Below is an example of a basic incentive spirometer.  The device you receive may differ slightly but they all function the same.    Why do I need to use an incentive spirometer?  Using your incentive spirometer prepares your lungs for surgery and helps prevent lung problems after surgery.  How do I use my incentive spirometer?  When you're using your incentive spirometer, make sure to breathe through your mouth. If you breathe through your nose, the incentive spirometer won't work properly. You can hold your nose if you have trouble.  If you feel dizzy at any time, stop and rest. Try again at a later time.  Follow the steps below:  Set up your incentive spirometer, expand the flexible tubing and connect  to the outlet.  Sit upright in a chair or bed. Hold the incentive spirometer at eye level.   Put the mouthpiece in your mouth and close your lips tightly around it. Slowly breathe out (exhale) completely.  Breathe in (inhale) slowly through your mouth as deeply as you can. As you take a breath, you will see the piston rise inside the large column. While the piston rises, the indicator should move upwards. It should stay in between the 2 arrows (see Figure).  Try to get the piston as high as you can, while keeping the indicator between the arrows.   If the indicator doesn't stay between the arrows, you're breathing either too fast or too slow.  When you get it as high as you can, hold your breath for 10 seconds, or as long as possible. While you're holding your breath, the piston will slowly fall to the base of the spirometer.  Once the piston reaches the bottom of the spirometer, breathe out slowly through your mouth. Rest for a few seconds.  Repeat 10 times. Try to get the piston to the same level with each breath.  Repeat every hour while awake  You can carefully clean the outside of the mouthpiece with an alcohol wipe or soap and water.       Patient and Family Education             Ways You Can Help Prevent Blood Clots                    This handout explains some simple things you can do to help prevent blood clots.      Blood clots are blockages that can form in the body's veins. When a blood clot forms in your deep veins, it may be called a deep vein thrombosis, or DVT for short. Blood clots can happen in any part of the body where blood flows, but they are most common in the arms and legs. If a piece of a blood clot breaks free and travels to the lungs, it is called a pulmonary embolus (PE). A PE can be a very serious problem.         Being in the hospital or having surgery can raise your chances of getting a blood clot because you may not be well enough to move around as much as you normally do.          Ways you can help prevent blood clots in the hospital           Wearing SCDs. SCDs stands for Sequential Compression Devices.   SCDs are special sleeves that wrap around your legs  They attach to a pump that fills them with air to gently squeeze your legs every few minutes.   This helps return the blood in your legs to your heart.   SCDs should only be taken off when walking or bathing.   SCDs may not be comfortable, but they can help save your life.                                            Wearing compression stockings - if your doctor orders them. These special snug fitting stockings gently squeeze your legs to help blood flow.       Walking. Walking helps move the blood in your legs.   If your doctor says it is ok, try walking the halls at least   5 times a day. Ask us to help you get up, so you don't fall.      Taking any blood thinning medicines your doctor orders.        Page 1 of 2            [facility]; 3/23   Ways you can help prevent blood clots at home         Wearing compression stockings - if your doctor orders them. ? Walking - to help move the blood in your legs.       Taking any blood thinning medicines your doctor orders.      Signs of a blood clot or PE        Tell your doctor or nurse know right away if you have of the problems listed below.    If you are at home, seek medical care right away. Call 911 for chest pain or problems breathing.                Signs of a blood clot (DVT) - such as pain,  swelling, redness or warmth in your arm or leg      Signs of a pulmonary embolism (PE) - such as chest pain or feeling short of breath    Patient Information: Pre-Operative Infection Prevention Measures     Why did I have my nose, under my arms, and groin swabbed?  The purpose of the swab is to identify Staphylococcus aureus inside your nose or on your skin.  The swab was sent to the laboratory for culture.  A positive swab/culture for Staphylococcus aureus is called colonization or  carriage.      What is Staphylococcus aureus?  Staphylococcus aureus, also known as “staph”, is a germ found on the skin or in the nose of healthy people.  Sometimes Staphylococcus aureus can get into the body and cause an infection.  This can be minor (such as pimples, boils, or other skin problems).  It might also be serious (such as a blood infection, pneumonia, or a surgical site infection).    What is Staphylococcus aureus colonization or carriage?  Colonization or carriage means that a person has the germ but is not sick from it.  These bacteria can be spread on the hands or when breathing or sneezing.    How is Staphylococcus aureus spread?  It is most often spread by close contact with a person or item that carries it.    What happens if my culture is positive for Staphylococcus aureus?  Your doctor/medical team will use this information to guide any antibiotic treatment which may be necessary.  Regardless of the culture results, we will clean the inside of your nose with a betadine swab just before you have your surgery.      Will I get an infection if I have Staphylococcus aureus in my nose or on my skin?  Anyone can get an infection with Staphylococcus aureus.  However, the best way to reduce your risk of infection is to follow the instructions provided to you for the use of your CHG soap and dental rinse.        Patient Information: Oral/Dental Rinse    What is oral/dental rinse?   It is a mouthwash. It is a way of cleaning the mouth with a germ-killing solution before your surgery.  The solution contains chlorhexidine, commonly known as CHG.   It is used inside the mouth to kill a bacteria known as Staphylococcus aureus.  Let your doctor know if you are allergic to Chlorhexidine.    We have sent a prescription for CHG 0.12% mouthwash to your preferred pharmacy.  If you have not already, Please  your prescription and start using the day before before surgery.  Follow the instruction sheet provided  to you at your CPM/PAT appointment. Please contact [facility] if you do not receive your CHG mouthwash prescription.     Why do I need to use CHG oral/dental rinse?  The CHG oral/dental rinse helps to kill a bacteria in your mouth known as Staphylococcus aureus.     This reduces the risk of infection at the surgical site.      Using your CHG oral/dental rinse  STEPS:  Use your CHG oral/dental rinse after you brush your teeth the night before (at bedtime) and the morning of your surgery.  Follow all directions on your prescription label.    Use the cap on the container to measure 15ml   Swish (gargle if you can) the mouthwash in your mouth for at least 30 seconds, (do not swallow) and spit out  After you use your CHG rinse, do not rinse your mouth with water, drink or eat.  Please refer to the prescription label for the appropriate time to resume oral intake      What side effects might I have using the CHG oral/dental rinse?  CHG rinse will stick to plaque on the teeth.  Brush and floss just before use.  Teeth brushing will help avoid staining of plaque during use.      Patient Information: Home Preoperative Antibacterial Shower      What is a home preoperative antibacterial shower?  This shower is a way of cleaning the skin with a germ-killing solution before surgery.  The solution contains chlorhexidine, commonly known as CHG.  CHG is a skin cleanser with germ-killing ability.  Let your doctor know if you are allergic to chlorhexidine.    Why do I need to take a preoperative antibacterial shower?  Skin is not sterile.  It is best to try to make your skin as free of germs as possible before surgery.  Proper cleansing with a germ-killing soap before surgery can lower the number of germs on your skin.  This helps to reduce the risk of infection at the surgical site.  Following the instructions listed below will help you prepare your skin for surgery.      How do I use the solution?  Steps:  Begin using your CHG soap 5  days before your scheduled surgery on 12/11/2024.    First, wash and rinse your hair using the CHG soap. Keep CHG soap away from ear canals and eyes.  Rinse completely, do not condition.  Hair extensions should be removed.  Wash your face with your normal soap and rinse.    Apply the CHG solution to a clean wet washcloth.  Turn the water off or move away from the water spray to avoid premature rinsing of the CHG soap as you are applying.   Firmly lather your entire body from the neck down.  Do not use on your face.  Pay special attention to the area(s) where your incision(s) will be located unless they are on your face.  Avoid scrubbing your skin too hard.  The important point is to have the CHG soap sit on your skin for 3 minutes.    When the 3 minutes are up, turn on the water and rinse the CHG solution off your body completely.   DO NOT wash with regular soap after you have used the CHG soap solution  Pat yourself dry with a clean, freshly-laundered towel.  DO NOT apply powders, deodorants, or lotions.  Dress in clean, freshly laundered nightclothes.    Be sure to sleep with clean, freshly laundered sheets.  Be aware that CHG will cause stains on fabrics; if you wash them with bleach after use.  Rinse your washcloth and other linens that have contact with CHG completely.  Use only non-chlorine detergents to launder the items used.   The morning of surgery is the fifth day.  Repeat the above steps and dress in clean comfortable clothing     Whom should I contact if I have any questions regarding the use of CHG soap?  Call the [facility] at [phone] if you have any questions.

## 2024-11-25 NOTE — GROUP NOTE
In addition to the group class activities, Tiki Gunter had the following lab work completed:  No orders of the defined types were placed in this encounter.      A new History and Physical was not completed.    This class lasted approximately 2 hours and had 7 participants. The nurse instructor covered the following topics:    MyChart Enrollment  Communication with Care Team  My Chart is the best form of communication to reach all of your caregivers  You can send messages to specific care givers, or a care team  Continued Education  You will be enrolled in a Total Joint Replacement care plan to receive additional education before and after surgery  You can review a short recording of the class content  Access to Medical Records  You can access test results, office notes, appointments, etc.  You can connect to other healthcare systems who use [employer] ([facility], [facility], [facility], etc.)  Advanced Patient Care  Program Information  Consent to Enroll    Background/Understanding of Joint Replacement Surgery  Potential for same day discharge  Any questions or concerns to be directed to the surgeon's office    How to Prepare for Surgery  Use of Nicotine Products/Smoking  Stop several weeks before surgery  Such products slow down the healing process and increase risk of post-op infection and complications  Clearance for Surgery  Medical Clearance by Specialists  Dental Clearance  Cracked/Broken/Loose teeth left untreated may postpone surgery  The importance of post-op antibiotics for dental visits per surgeon protocol  Preadmission Testing  **Potential for postponed surgery if appropriate clearance is not obtained  Medication Instruction  Follow instructions provided by the doctor who prescribes your medication (typically, but not limited to cardiologist)  Preadmission testing will provide additional instructions during your appointment on what to stop and what to take as you get closer to surgery  For clarification of these  instructions, please call preadmission testing directly - [phone]  Tips for Preparing the home for discharge from the hospital  Care Partner  Requirement for surgery, the patient must have a plan to have help at home  Potential for postponed surgery if plan for home support cannot be established  How the care partner can help after surgery  CHG Body Wash/Mouth Wash  Follow the instructions given at preadmission testing  Body wash is to be used on the body and hair for 5 washes  Mouthwash is to be used the night before and morning of surgery  **This is a system-wide protocol developed by infectious disease professionals, we will not alter our recommendations for those with sensitive skin or those who have special hair needs.  Please follow the instructions as they are written as this will provide the best infection prevention measures for surgery.  Should you have an allergy to one of the products, please discuss with your preadmission team**    What to Expect in the Hospital/At Home  Morning of Surgery NPO Guidelines  Nothing to eat after midnight  Water can be consumed up to 2 hours prior to arrival  Surgical and Post-Surgical Care Team  Surgical Team  Anesthesia Team  Nursing  Physical Therapy  Care Coordinating  Pharmacy  Hospital Arrival Instructions  Arrive at the time provided to you  Consider traffic patterns (rush-hour) based on arrival time  Have arrangements made for a ride home  If discharging same day, care partner should remain at the hospital  Recovering after Surgery  Recovery Room - Visitors are not brought back  Transition to hospital room - 2nd Floor, Visitors will be directed to your room  The presence of and strategies for controlling surgical pain and swelling  The importance of early mobility  Side effects after surgery  What to expect if staying overnight    Discharge Planning  The intended plan for discharge will be for patients to discharge home  All patients require a care partner  (family, friend, neighbor, etc.) to stay with the patient for the first few nights after surgery  The inability to secure help at home will postpone surgery  Home Care Services set up per surgeon order  Physical Therapy  Occupational Therapy  **If desired, private duty care can be arranged by the patient ahead of time**  Outpatient Physical Therapy per surgeon order    Recovering at Home  Wound Care  Follow wound care instructions found in your discharge paperwork  Bandage is water-resistant and you may shower with the bandage  Do not scrub directly over the bandage  Do not submerge in water until cleared (bathtub, hot tub, pool, etc.)    Post-Op Risk Prevention  Infection Prevention  Promptly seek treatment for any infections post-operatively  Routine dental visits must be postponed for 3 months after surgery  Your surgeon may require antibiotics prior to future dental visits  Any concerns for infection not related directly to the knee or the hip should be managed by your primary care provider  Blood Clots  Be sure to complete the course of blood thinning medication as prescribed by your surgeon  Movement every 1-2 hours during the day is encouraged to prevent blood clots  Monitor for signs of blood clots  Wear compression stockings as prescribed by your surgeon  Constipation  Constipation is common following surgery  Drink plenty of fluids  Take stool softener/laxative as prescribed by your surgeon  Move around frequently  Eat foods high in fiber  Fall Prevention  Prepare home ahead of time to clear space to move with walker  Remove throw rugs and electrical cords from walkways  Install railings near any stairways with more than 2 steps  Use night lights for increased visibility at night  Continue to use your assistive device until cleared by surgeon or physical therapy  Dislocation Prevention - Not all procedures will have dislocation precautions  Follow dislocation precautions provided by your surgeon  It is OK  to resume sexual activity about 6 weeks following surgery  Be sure to follow any dislocation precautions assigned    Durable Medical Equipment  Cold Therapy  Breg Cold Therapy Machines  Ice/Gel Packs  Assistive Devices  Folding Walker with Wheels (in the front only)  No Rollators  Crutches if approved by Physical Therapy and Surgeon after surgery  Hip Kits  Raised Toilet Seats  Additional Compression Stockings    Joint Preservation  Healthy Activities when Cleared  Walking  Swimming  Bike Riding  Activities to Avoid  Refrain from repetitive motions which have a high impact on the joint  Gradual Progression  Progress activity slowly, listen to your body  Common Findings - NORMAL after surgery  Clicking/Grinding  Numbness near incision    Physical Therapy  Prehabilitation exercises  START TODAY ON BOTH LEGS  Surgery Specific Precautions  Follow surgery specific precautions found in your discharge paperwork    Follow-Up Visit  All patients will see their surgeon for a follow up visit after surgery  The visit may range from 2-6 weeks after surgery and is surgeon specific      Please don't hesitate to reach out if you have any additional questions or concerns.    Clarissa Saul MBA, BSN, RN-BC  Rosalba Martinez RN  [facility]  [facility]   [phone]

## 2024-11-25 NOTE — H&P (VIEW-ONLY)
CPM/PAT Evaluation       Name: Tiki Gunter (Tiki Gunter)  /Age: 1965/59 y.o.     In-Person       Chief Complaint: Unilateral primary osteoarthritis, right hip     Patient is an alert and oriented 59 year old female scheduled for a right total hip arthroplasty on 2024 with Dr. Hernandez for   Unilateral primary osteoarthritis, right hip. She endorses right hip pain that she rates at a 4/10 and worsens with movement and ambulation. She is ambulatory without assistive devices with a current METS score 8. PMHX includes HTN, HLD, GERD, and ascending aorta dilation. Presents to [facility] PAT today for perioperative risk stratification and optimization.     Past Medical History:   Diagnosis Date    Arthritis     Ascending aorta dilatation (CMS-HCC)     Delayed emergence from general anesthesia     GERD (gastroesophageal reflux disease)     Hyperlipidemia     Hypertension      Past Surgical History:   Procedure Laterality Date    BUNIONECTOMY Bilateral     COLONOSCOPY  2019    HIP ARTHROPLASTY Left     VA BREAST AUGMENTATION WITH IMPLANT  2006    TONSILLECTOMY      TOTAL SHOULDER ARTHROPLASTY Left 2024     Patient  has no history on file for sexual activity.    No family history on file.    Allergies   Allergen Reactions    Zetia [Ezetimibe] Dizziness     Jaw pain    Rosuvastatin Rash and Myalgia    Statins-Hmg-Coa Reductase Inhibitors Rash and Myalgia     Medication Documentation Review Audit       Reviewed by Gayle Vogel RN (Registered Nurse) on 24 at 1129      Medication Order Taking? Sig Documenting Provider Last Dose Status   acetaminophen (Tylenol) 500 mg tablet [id number] Yes Take 2 tablets (1,000 mg) by mouth every 6 hours if needed for mild pain (1 - 3). Historical Provider, MD 2024 Active   amLODIPine (Norvasc) 5 mg tablet [id number] Yes Take 1 tablet (5 mg) by mouth once daily. El Tang MD 2024 Active   ascorbic acid (Vitamin C) 1,000 mg tablet [id number] Yes  Take 1 tablet (1,000 mg) by mouth once daily. Parvin Lindquist MD 11/25/2024 Active   cetirizine (ZyrTEC) 10 mg chewable tablet [id number] Yes Chew 1 tablet (10 mg) if needed for allergies. Parvin Lindquist MD 11/24/2024 Active   cholecalciferol (Vitamin D-3) 125 MCG (5000 UT) capsule [id number] Yes Take 1 capsule (125 mcg) by mouth once daily. Parvin Lindquist MD Past Week Active   EpiCeram lotion [id number] Yes Apply topically. 2-3 times per day Parvin Lindquist MD Past Week Active   ezetimibe (Zetia) 10 mg tablet [id number]  Take 1 tablet (10 mg) by mouth once daily. CHETAN Guillermo-CNP  Active   ibuprofen 200 mg tablet [id number] Yes Take 3 tablets (600 mg) by mouth every 6 hours. Parvin Lindquist MD 11/25/2024 Active   magnesium oxide (Mag-Ox) 400 mg tablet [id number] Yes Take 1 tablet (400 mg) by mouth once daily. Parvin Lindquist MD Past Month Active   omeprazole (PriLOSEC) 20 mg DR capsule [id number] Yes Take 1 capsule (20 mg) by mouth every other day. Parvin Lindquist MD 11/24/2024 Active   penicillin v potassium (Veetid) 500 mg tablet [id number] Yes TAKE 4 TABLETS BY MOUTH 1 HOUR PRIOR TO PROCEDURE, AND 2 TABLETS EVERY 6 HOURS POST PROCEDURE Parvin Lindquist MD Past Month Active                     Review of Systems   Constitutional: Negative for chills, decreased appetite, diaphoresis, fever and malaise/fatigue.   Eyes:  Negative for blurred vision and double vision.   Cardiovascular:  Negative for chest pain, claudication, cyanosis, dyspnea on exertion, irregular heartbeat, leg swelling, near-syncope and palpitations.   Respiratory:  Negative for cough, hemoptysis, shortness of breath and wheezing.    Endocrine: Negative for cold intolerance, heat intolerance, polydipsia, polyphagia and polyuria.   Gastrointestinal:  Negative for abdominal pain, constipation, diarrhea, dysphagia, nausea and vomiting.   Genitourinary:  Negative for bladder incontinence, dysuria, hematuria,  "incomplete emptying, nocturia, frequency, pelvic pain and urgency.   Neurological:  Negative for headaches, light-headedness, paresthesias, sensory change and weakness.   Psychiatric/Behavioral:  Negative for altered mental status.    Musculoskeletal: Negative for myalgias. Positive for arthralgias     Vitals and nursing note reviewed.     Physical exam  Constitutional:       Appearance: Normal appearance. She is Overweight.   HENT:      Head: Normocephalic and atraumatic.      Mouth/Throat:      Mouth: Mucous membranes are moist.      Pharynx: Oropharynx is clear.   Eyes:      Extraocular Movements: Extraocular movements intact.      Conjunctiva/sclera: Conjunctivae normal.      Pupils: Pupils are equal, round, and reactive to light.   Cardiovascular:      PMI at left midclavicular line. Normal rate. Regular rhythm. Normal S1. Normal S2.       Murmurs: There is no murmur.      No gallop.  No click. No rub.       No audible carotid bruit     No lower extremity edema on exam  Pulmonary:      Effort: Pulmonary effort is normal.      Breath sounds: Normal breath sounds.   Abdominal:      General: Abdomen is flat. Bowel sounds are normal.      Palpations: Abdomen is soft and non-tender  Musculoskeletal:      Cervical back: Normal range of motion and neck supple.      Hip, right:limited ROM  Skin:     General: Skin is warm and dry.      Capillary Refill: Capillary refill takes less than 2 seconds.   Neurological:      General: No focal deficit present.      Mental Status: She is alert and oriented to person, place, and time. Mental status is at baseline.   Psychiatric:         Mood and Affect: Mood normal.         Behavior: Behavior normal.         Thought Content: Thought content normal.         Judgment: Judgment normal.     Vitals and nursing note reviewed. Physical exam within normal limits.     Visit Vitals  BP (!) 154/96   Pulse 73   Temp 36.7 °C (98 °F) (Temporal)   Resp 14   Ht 1.626 m (5' 4\")   Wt 72.2 kg (159 " lb 2.8 oz)   LMP  (LMP Unknown) Comment: uterine ablasion, over 55   SpO2 100%   BMI 27.32 kg/m²   OB Status Postmenopausal   Smoking Status Former   BSA 1.81 m²      DASI Risk Score      Flowsheet Row Pre-Admission Testing from 11/25/2024 in [facility] Pre-Admission Testing from 4/5/2024 in [facility]   Can you take care of yourself (eat, dress, bathe, or use toilet)?  2.75 filed at 11/25/2024 1200 2.75 filed at 04/05/2024 1001   Can you walk indoors, such as around your house? 1.75 filed at 11/25/2024 1200 1.75 filed at 04/05/2024 1001   Can you walk a block or two on level ground?  2.75 filed at 11/25/2024 1200 2.75 filed at 04/05/2024 1001   Can you climb a flight of stairs or walk up a hill? 5.5 filed at 11/25/2024 1200 5.5 filed at 04/05/2024 1001   Can you run a short distance? 0 filed at 11/25/2024 1200 0 filed at 04/05/2024 1001   Can you do light work around the house like dusting or washing dishes? 2.7 filed at 11/25/2024 1200 2.7 filed at 04/05/2024 1001   Can you do moderate work around the house like vacuuming, sweeping floors or carrying groceries? 3.5 filed at 11/25/2024 1200 3.5 filed at 04/05/2024 1001   Can you do heavy work around the house like scrubbing floors or lifting and moving heavy furniture?  8 filed at 11/25/2024 1200 8 filed at 04/05/2024 1001   Can you do yard work like raking leaves, weeding or pushing a mower? 4.5 filed at 11/25/2024 1200 4.5 filed at 04/05/2024 1001   Can you have sexual relations? 5.25 filed at 11/25/2024 1200 5.25 filed at 04/05/2024 1001   Can you participate in moderate recreational activities like golf, bowling, dancing, doubles tennis or throwing a baseball or football? 6 filed at 11/25/2024 1200 6 filed at 04/05/2024 1001   Can you participate in strenous sports like swimming, singles tennis, football, basketball, or skiing? 0 filed at 11/25/2024 1200 0 filed at 04/05/2024 1001   DASI SCORE 42.7 filed at 11/25/2024 1200 42.7  filed at 04/05/2024 1001   METS Score (Will be calculated only when all the questions are answered) 8 filed at 11/25/2024 1200 8 filed at 04/05/2024 1001          Caprini DVT Assessment      Flowsheet Row Pre-Admission Testing from 11/25/2024 in [facility]   DVT Score 7 filed at 11/25/2024 1159   Surgical Factors Elective major lower extremity arthroplasty filed at 11/25/2024 1159   BMI 30 or less filed at 11/25/2024 1159          Modified Frailty Index      Flowsheet Row Pre-Admission Testing from 11/25/2024 in [facility]   Non-independent functional status (problems with dressing, bathing, personal grooming, or cooking) 0 filed at 11/25/2024 1200   History of diabetes mellitus  0 filed at 11/25/2024 1200   History of COPD 0 filed at 11/25/2024 1200   History of CHF No filed at 11/25/2024 1200   History of MI 0 filed at 11/25/2024 1200   History of Percutaneous Coronary Intervention, Cardiac Surgery, or Angina No filed at 11/25/2024 1200   Hypertension requiring the use of medication  0.0909 filed at 11/25/2024 1200   Peripheral vascular disease 0 filed at 11/25/2024 1200   Impaired sensorium (cognitive impairement or loss, clouding, or delirium) 0 filed at 11/25/2024 1200   TIA or CVA withouy residual deficit 0 filed at 11/25/2024 1200   Cerebrovascular accident with deficit 0 filed at 11/25/2024 1200   Modified Frailty Index Calculator .0909 filed at 11/25/2024 1200          CHADS2 Stroke Risk  Current as of 2 hours ago        N/A 3 to 100%: High Risk   2 to < 3%: Medium Risk   0 to < 2%: Low Risk     Last Change: N/A          This score determines the patient's risk of having a stroke if the patient has atrial fibrillation.        This score is not applicable to this patient. Components are not calculated.          Revised Cardiac Risk Index      Flowsheet Row Pre-Admission Testing from 11/25/2024 in [facility]   High-Risk Surgery (Intraperitoneal,  Intrathoracic,Suprainguinal vascular) 0 filed at 11/25/2024 1200   History of ischemic heart disease (History of MI, History of positive exercuse test, Current chest paint considered due to myocardial ischemia, Use of nitrate therapy, ECG with pathological Q Waves) 0 filed at 11/25/2024 1200   History of congestive heart failure (pulmonary edemia, bilateral rales or S3 gallop, Paroxysmal nocturnal dyspnea, CXR showing pulmonary vascular redistribution) 0 filed at 11/25/2024 1200   History of cerebrovascular disease (Prior TIA or stroke) 0 filed at 11/25/2024 1200   Pre-operative insulin treatment 0 filed at 11/25/2024 1200   Pre-operative creatinine>2 mg/dl 0 filed at 11/25/2024 1200   Revised Cardiac Risk Calculator 0 filed at 11/25/2024 1200          Apfel Simplified Score    No data to display       Risk Analysis Index Results This Encounter    No data found in the last 10 encounters.       Stop Bang Score      Flowsheet Row Pre-Admission Testing from 11/25/2024 in [facility] Admission (Discharged) from 4/19/2024 in [facility] OR with Walt Reece MD   Do you snore loudly? 0 filed at 11/25/2024 1133 1 filed at 04/19/2024 0924   Do you often feel tired or fatigued after your sleep? 1 filed at 11/25/2024 1133 1 filed at 04/19/2024 0924   Has anyone ever observed you stop breathing in your sleep? 0 filed at 11/25/2024 1133 0 filed at 04/19/2024 0924   Do you have or are you being treated for high blood pressure? 1 filed at 11/25/2024 1133 1 filed at 04/19/2024 0924   Recent BMI (Calculated) 27.3 filed at 11/25/2024 1133 26.3 filed at 04/19/2024 0924   Is BMI greater than 35 kg/m2? 0=No filed at 11/25/2024 1133 0=No filed at 04/19/2024 0924   Age older than 50 years old? 1=Yes filed at 11/25/2024 1133 1=Yes filed at 04/19/2024 0924   Is your neck circumference greater than 17 inches (Male) or 16 inches (Female)? 0 filed at 11/25/2024 1133 0 filed at 04/19/2024 0924   Gender -  Male 0=No filed at 11/25/2024 1133 0=No filed at 04/19/2024 0924   STOP-BANG Total Score 3 filed at 11/25/2024 1133 4 filed at 04/19/2024 0924          Prodigy: High Risk  Total Score: 0          ARISCAT Score for Postoperative Pulmonary Complications      Flowsheet Row Pre-Admission Testing from 11/25/2024 in [facility]   Age, years  5 filed at 11/25/2024 1200   Preoperative SpO2 0 filed at 11/25/2024 1200   Respiratory infection in the last month Either upper or lower (i.e., URI, bronchitis, pneumonia), with fever and antibiotic treatment 0 filed at 11/25/2024 1200   Preoperative anemoa (Hgb less than 10 g/dl) 0 filed at 11/25/2024 1200   Surgical incision  0 filed at 11/25/2024 1200   Duration of surgery  16 filed at 11/25/2024 1200   Emergency Procedure  0 filed at 11/25/2024 1200   ARISCAT Total Score  19 filed at 11/25/2024 1200          Rachel Perioperative Risk for Myocardial Infarction or Cardiac Arrest (HERLINDA)      Flowsheet Row Pre-Admission Testing from 11/25/2024 in [facility]   Age 1.14 filed at 11/25/2024 1200   Functional Status  0 filed at 11/25/2024 1200   ASA Class  -3.29 filed at 11/25/2024 1200   Creatinine 0 filed at 11/25/2024 1200   Type of Procedure  0.80 filed at 11/25/2024 1200   HERLINDA Total Score  -6.6 filed at 11/25/2024 1200   HERLINDA % 0.14 filed at 11/25/2024 1200          Assessment & Plan:    Neuro:  No diagnosis or significant findings on chart review or clinical presentation and evaluation.     HEENT/Airway:  No diagnosis or significant findings on chart review or clinical presentation and evaluation.   STOP-BANG Score-3 points moderate risk for JUAN A    Mallampati::  II    TM distance::  >3 FB    Neck ROM::  Full  Dentures-denies  Crowns-denies  Implants-denies    Cardiovascular:  No significant findings on chart review or clinical presentation and evaluation.   History of Hypertension-Managed with Amlodipine. BP in /96.   History of  Hyperlipidemia-Managed with Ezetimibe  History of Ascending aortic dilation-Last checked via ECHO 5/28/2024-3.75 cm. Followed by cardiology Dr Katz/Marlen BENTON  METS: 8  RCRI: 0 points, 3.9%  risk for postoperative MACE   HERLINDA: 0.14% risk for postoperative MACE  EKG -Normal sinus rhythm  Cannot rule out Anterior infarct , age undetermined  Abnormal ECG  When compared with ECG of 25-MAR-2019 08:57,  No significant change was found  Confirmed by Medhat Katz ([id number]) on 4/17/2024 3:20:53 PM    Pulmonary:  No diagnosis or significant findings on chart review or clinical presentation and evaluation.   ARISCAT: <26 points, 1.6% risk of in-hospital postoperative pulmonary complication  PRODIGY: Low risk for opioid induced respiratory depression  Smoking History-She has never smoked.  Discussed smoking cessation and deep breathing handout given    Renal/Urinary:  No diagnosis or significant findings on chart review or clinical presentation and evaluation, however, the patient is at increased risk of perioperative renal complications secondary to age>/= 56 and HTN. Preventative measures include BP monitoring, medication compliance, and hydration management.   CMP-Reviewed, stable  Creatinine-0.68  GFR->90    Endocrine:  No diagnosis or significant findings on chart review or clinical presentation and evaluation.   QPF2P-5.5%    Hematologic/Immunology:  No diagnosis or significant findings on chart review or clinical presentation and evaluation.  The patient is not a Jehovah’s witness and will accept blood and blood products if medically indicated.   History of previous blood transfusions No  CBC-Reviewed, stable  Positive for Anemia-HGB 11.3. Normocytic, normochromic  Caprini Score 7, patient at High for postoperative DVT. Pt supplied education/VTE handout  Anticoagulation use: No     Gastrointestinal:   No significant findings on chart review or clinical presentation and evaluation.   History of GERD-Managed with  Omeprazole  Recreational drug use: Drug use No  Alcohol use social drinker    Infectious disease:   No diagnosis or significant findings on chart review or clinical presentation and evaluation.   Prescription provided for CHG body wash and dental rinse. CHG use instructions reviewed and provided to patient.  Staph screen collected-Negative    Musculoskeletal:   No diagnosis or significant findings on chart review or clinical presentation and evaluation.   JHFRAT score-3 points. low risk for falls    Anesthesia:  ASA 2 - Patient with mild systemic disease with no functional limitations  Anticipated anesthesia-Consult  History of General anesthesia-yes  Complications- Slow emergence  No family history of anesthesia complications    Abnormalities noted on PAT evaluation: No    Labs & Imaging ordered:  CBC, CMP, HBA1C, MRSA, EKG  Nickel/metal allergy-negative  Shellfish allergy-Negative  Face to face time-30 minutes

## 2024-11-25 NOTE — CPM/PAT H&P
CPM/PAT Evaluation       Name: Tiki Gunter (Tiki Gunter)  /Age: 1965/59 y.o.     In-Person       Chief Complaint: Unilateral primary osteoarthritis, right hip     Patient is an alert and oriented 59 year old female scheduled for a right total hip arthroplasty on 2024 with Dr. Hernandez for   Unilateral primary osteoarthritis, right hip. She endorses right hip pain that she rates at a 4/10 and worsens with movement and ambulation. She is ambulatory without assistive devices with a current METS score 8. PMHX includes HTN, HLD, GERD, and ascending aorta dilation. Presents to [facility] [place] PAT today for perioperative risk stratification and optimization.     Past Medical History:   Diagnosis Date    Arthritis     Ascending aorta dilatation (CMS-HCC)     Delayed emergence from general anesthesia     GERD (gastroesophageal reflux disease)     Hyperlipidemia     Hypertension      Past Surgical History:   Procedure Laterality Date    BUNIONECTOMY Bilateral     COLONOSCOPY  2019    HIP ARTHROPLASTY Left     IA BREAST AUGMENTATION WITH IMPLANT  2006    TONSILLECTOMY      TOTAL SHOULDER ARTHROPLASTY Left 2024     Patient  has no history on file for sexual activity.    No family history on file.    Allergies   Allergen Reactions    Zetia [Ezetimibe] Dizziness     Jaw pain    Rosuvastatin Rash and Myalgia    Statins-Hmg-Coa Reductase Inhibitors Rash and Myalgia     Medication Documentation Review Audit       Reviewed by Gayle Vogel RN (Registered Nurse) on 24 at 1129      Medication Order Taking? Sig Documenting Provider Last Dose Status   acetaminophen (Tylenol) 500 mg tablet [id number] Yes Take 2 tablets (1,000 mg) by mouth every 6 hours if needed for mild pain (1 - 3). Historical Provider, MD 2024 Active   amLODIPine (Norvasc) 5 mg tablet [id number] Yes Take 1 tablet (5 mg) by mouth once daily. El Tang MD 2024 Active   ascorbic acid (Vitamin C) 1,000 mg tablet [id number] Yes  Take 1 tablet (1,000 mg) by mouth once daily. Parvin Lindquist MD 11/25/2024 Active   cetirizine (ZyrTEC) 10 mg chewable tablet [id number] Yes Chew 1 tablet (10 mg) if needed for allergies. Parvin Lindquist MD 11/24/2024 Active   cholecalciferol (Vitamin D-3) 125 MCG (5000 UT) capsule [id number] Yes Take 1 capsule (125 mcg) by mouth once daily. Parvin Lindquist MD Past Week Active   EpiCeram lotion [id number] Yes Apply topically. 2-3 times per day Parvin Lindquist MD Past Week Active   ezetimibe (Zetia) 10 mg tablet [id number]  Take 1 tablet (10 mg) by mouth once daily. CHETAN Guillermo-CNP  Active   ibuprofen 200 mg tablet [id number] Yes Take 3 tablets (600 mg) by mouth every 6 hours. Parvin Lindquist MD 11/25/2024 Active   magnesium oxide (Mag-Ox) 400 mg tablet [id number] Yes Take 1 tablet (400 mg) by mouth once daily. Parvin Lindquist MD Past Month Active   omeprazole (PriLOSEC) 20 mg DR capsule [id number] Yes Take 1 capsule (20 mg) by mouth every other day. Parvin Lindquist MD 11/24/2024 Active   penicillin v potassium (Veetid) 500 mg tablet [id number] Yes TAKE 4 TABLETS BY MOUTH 1 HOUR PRIOR TO PROCEDURE, AND 2 TABLETS EVERY 6 HOURS POST PROCEDURE Parvin Lindquist MD Past Month Active                     Review of Systems   Constitutional: Negative for chills, decreased appetite, diaphoresis, fever and malaise/fatigue.   Eyes:  Negative for blurred vision and double vision.   Cardiovascular:  Negative for chest pain, claudication, cyanosis, dyspnea on exertion, irregular heartbeat, leg swelling, near-syncope and palpitations.   Respiratory:  Negative for cough, hemoptysis, shortness of breath and wheezing.    Endocrine: Negative for cold intolerance, heat intolerance, polydipsia, polyphagia and polyuria.   Gastrointestinal:  Negative for abdominal pain, constipation, diarrhea, dysphagia, nausea and vomiting.   Genitourinary:  Negative for bladder incontinence, dysuria, hematuria,  "incomplete emptying, nocturia, frequency, pelvic pain and urgency.   Neurological:  Negative for headaches, light-headedness, paresthesias, sensory change and weakness.   Psychiatric/Behavioral:  Negative for altered mental status.    Musculoskeletal: Negative for myalgias. Positive for arthralgias     Vitals and nursing note reviewed.     Physical exam  Constitutional:       Appearance: Normal appearance. She is Overweight.   HENT:      Head: Normocephalic and atraumatic.      Mouth/Throat:      Mouth: Mucous membranes are moist.      Pharynx: Oropharynx is clear.   Eyes:      Extraocular Movements: Extraocular movements intact.      Conjunctiva/sclera: Conjunctivae normal.      Pupils: Pupils are equal, round, and reactive to light.   Cardiovascular:      PMI at left midclavicular line. Normal rate. Regular rhythm. Normal S1. Normal S2.       Murmurs: There is no murmur.      No gallop.  No click. No rub.       No audible carotid bruit     No lower extremity edema on exam  Pulmonary:      Effort: Pulmonary effort is normal.      Breath sounds: Normal breath sounds.   Abdominal:      General: Abdomen is flat. Bowel sounds are normal.      Palpations: Abdomen is soft and non-tender  Musculoskeletal:      Cervical back: Normal range of motion and neck supple.      Hip, right:limited ROM  Skin:     General: Skin is warm and dry.      Capillary Refill: Capillary refill takes less than 2 seconds.   Neurological:      General: No focal deficit present.      Mental Status: She is alert and oriented to person, place, and time. Mental status is at baseline.   Psychiatric:         Mood and Affect: Mood normal.         Behavior: Behavior normal.         Thought Content: Thought content normal.         Judgment: Judgment normal.     Vitals and nursing note reviewed. Physical exam within normal limits.     Visit Vitals  BP (!) 154/96   Pulse 73   Temp 36.7 °C (98 °F) (Temporal)   Resp 14   Ht 1.626 m (5' 4\")   Wt 72.2 kg (159 " lb 2.8 oz)   LMP  (LMP Unknown) Comment: uterine ablasion, over 55   SpO2 100%   BMI 27.32 kg/m²   OB Status Postmenopausal   Smoking Status Former   BSA 1.81 m²      DASI Risk Score      Flowsheet Row Pre-Admission Testing from 11/25/2024 in [facility] Pre-Admission Testing from 4/5/2024 in [facility]   Can you take care of yourself (eat, dress, bathe, or use toilet)?  2.75 filed at 11/25/2024 1200 2.75 filed at 04/05/2024 1001   Can you walk indoors, such as around your house? 1.75 filed at 11/25/2024 1200 1.75 filed at 04/05/2024 1001   Can you walk a block or two on level ground?  2.75 filed at 11/25/2024 1200 2.75 filed at 04/05/2024 1001   Can you climb a flight of stairs or walk up a hill? 5.5 filed at 11/25/2024 1200 5.5 filed at 04/05/2024 1001   Can you run a short distance? 0 filed at 11/25/2024 1200 0 filed at 04/05/2024 1001   Can you do light work around the house like dusting or washing dishes? 2.7 filed at 11/25/2024 1200 2.7 filed at 04/05/2024 1001   Can you do moderate work around the house like vacuuming, sweeping floors or carrying groceries? 3.5 filed at 11/25/2024 1200 3.5 filed at 04/05/2024 1001   Can you do heavy work around the house like scrubbing floors or lifting and moving heavy furniture?  8 filed at 11/25/2024 1200 8 filed at 04/05/2024 1001   Can you do yard work like raking leaves, weeding or pushing a mower? 4.5 filed at 11/25/2024 1200 4.5 filed at 04/05/2024 1001   Can you have sexual relations? 5.25 filed at 11/25/2024 1200 5.25 filed at 04/05/2024 1001   Can you participate in moderate recreational activities like golf, bowling, dancing, doubles tennis or throwing a baseball or football? 6 filed at 11/25/2024 1200 6 filed at 04/05/2024 1001   Can you participate in strenous sports like swimming, singles tennis, football, basketball, or skiing? 0 filed at 11/25/2024 1200 0 filed at 04/05/2024 1001   DASI SCORE 42.7 filed at 11/25/2024 1200 42.7  filed at 04/05/2024 1001   METS Score (Will be calculated only when all the questions are answered) 8 filed at 11/25/2024 1200 8 filed at 04/05/2024 1001          Caprini DVT Assessment      Flowsheet Row Pre-Admission Testing from 11/25/2024 in [facility]   DVT Score 7 filed at 11/25/2024 1159   Surgical Factors Elective major lower extremity arthroplasty filed at 11/25/2024 1159   BMI 30 or less filed at 11/25/2024 1159          Modified Frailty Index      Flowsheet Row Pre-Admission Testing from 11/25/2024 in [facility]   Non-independent functional status (problems with dressing, bathing, personal grooming, or cooking) 0 filed at 11/25/2024 1200   History of diabetes mellitus  0 filed at 11/25/2024 1200   History of COPD 0 filed at 11/25/2024 1200   History of CHF No filed at 11/25/2024 1200   History of MI 0 filed at 11/25/2024 1200   History of Percutaneous Coronary Intervention, Cardiac Surgery, or Angina No filed at 11/25/2024 1200   Hypertension requiring the use of medication  0.0909 filed at 11/25/2024 1200   Peripheral vascular disease 0 filed at 11/25/2024 1200   Impaired sensorium (cognitive impairement or loss, clouding, or delirium) 0 filed at 11/25/2024 1200   TIA or CVA withouy residual deficit 0 filed at 11/25/2024 1200   Cerebrovascular accident with deficit 0 filed at 11/25/2024 1200   Modified Frailty Index Calculator .0909 filed at 11/25/2024 1200          CHADS2 Stroke Risk  Current as of 2 hours ago        N/A 3 to 100%: High Risk   2 to < 3%: Medium Risk   0 to < 2%: Low Risk     Last Change: N/A          This score determines the patient's risk of having a stroke if the patient has atrial fibrillation.        This score is not applicable to this patient. Components are not calculated.          Revised Cardiac Risk Index      Flowsheet Row Pre-Admission Testing from 11/25/2024 in [facility]   High-Risk Surgery (Intraperitoneal,  Intrathoracic,Suprainguinal vascular) 0 filed at 11/25/2024 1200   History of ischemic heart disease (History of MI, History of positive exercuse test, Current chest paint considered due to myocardial ischemia, Use of nitrate therapy, ECG with pathological Q Waves) 0 filed at 11/25/2024 1200   History of congestive heart failure (pulmonary edemia, bilateral rales or S3 gallop, Paroxysmal nocturnal dyspnea, CXR showing pulmonary vascular redistribution) 0 filed at 11/25/2024 1200   History of cerebrovascular disease (Prior TIA or stroke) 0 filed at 11/25/2024 1200   Pre-operative insulin treatment 0 filed at 11/25/2024 1200   Pre-operative creatinine>2 mg/dl 0 filed at 11/25/2024 1200   Revised Cardiac Risk Calculator 0 filed at 11/25/2024 1200          Apfel Simplified Score    No data to display       Risk Analysis Index Results This Encounter    No data found in the last 10 encounters.       Stop Bang Score      Flowsheet Row Pre-Admission Testing from 11/25/2024 in [facility] Admission (Discharged) from 4/19/2024 in [facility] OR with Walt Reece MD   Do you snore loudly? 0 filed at 11/25/2024 1133 1 filed at 04/19/2024 0924   Do you often feel tired or fatigued after your sleep? 1 filed at 11/25/2024 1133 1 filed at 04/19/2024 0924   Has anyone ever observed you stop breathing in your sleep? 0 filed at 11/25/2024 1133 0 filed at 04/19/2024 0924   Do you have or are you being treated for high blood pressure? 1 filed at 11/25/2024 1133 1 filed at 04/19/2024 0924   Recent BMI (Calculated) 27.3 filed at 11/25/2024 1133 26.3 filed at 04/19/2024 0924   Is BMI greater than 35 kg/m2? 0=No filed at 11/25/2024 1133 0=No filed at 04/19/2024 0924   Age older than 50 years old? 1=Yes filed at 11/25/2024 1133 1=Yes filed at 04/19/2024 0924   Is your neck circumference greater than 17 inches (Male) or 16 inches (Female)? 0 filed at 11/25/2024 1133 0 filed at 04/19/2024 0924   Gender -  Male 0=No filed at 11/25/2024 1133 0=No filed at 04/19/2024 0924   STOP-BANG Total Score 3 filed at 11/25/2024 1133 4 filed at 04/19/2024 0924          Prodigy: High Risk  Total Score: 0          ARISCAT Score for Postoperative Pulmonary Complications      Flowsheet Row Pre-Admission Testing from 11/25/2024 in [facility]   Age, years  3 filed at 11/25/2024 1200   Preoperative SpO2 0 filed at 11/25/2024 1200   Respiratory infection in the last month Either upper or lower (i.e., URI, bronchitis, pneumonia), with fever and antibiotic treatment 0 filed at 11/25/2024 1200   Preoperative anemoa (Hgb less than 10 g/dl) 0 filed at 11/25/2024 1200   Surgical incision  0 filed at 11/25/2024 1200   Duration of surgery  16 filed at 11/25/2024 1200   Emergency Procedure  0 filed at 11/25/2024 1200   ARISCAT Total Score  19 filed at 11/25/2024 1200          Rachel Perioperative Risk for Myocardial Infarction or Cardiac Arrest (HERLINDA)      Flowsheet Row Pre-Admission Testing from 11/25/2024 in [facility]   Age 1.14 filed at 11/25/2024 1200   Functional Status  0 filed at 11/25/2024 1200   ASA Class  -3.29 filed at 11/25/2024 1200   Creatinine 0 filed at 11/25/2024 1200   Type of Procedure  0.80 filed at 11/25/2024 1200   HERLINDA Total Score  -6.6 filed at 11/25/2024 1200   HERLINDA % 0.14 filed at 11/25/2024 1200          Assessment & Plan:    Neuro:  No diagnosis or significant findings on chart review or clinical presentation and evaluation.     HEENT/Airway:  No diagnosis or significant findings on chart review or clinical presentation and evaluation.   STOP-BANG Score-3 points moderate risk for JUAN A    Mallampati::  II    TM distance::  >3 FB    Neck ROM::  Full  Dentures-denies  Crowns-denies  Implants-denies    Cardiovascular:  No significant findings on chart review or clinical presentation and evaluation.   History of Hypertension-Managed with Amlodipine. BP in /96.   History of  Hyperlipidemia-Managed with Ezetimibe  History of Ascending aortic dilation-Last checked via ECHO 5/28/2024-3.75 cm. Followed by cardiology Dr Katz/Marlen BENTON  METS: 8  RCRI: 0 points, 3.9%  risk for postoperative MACE   HERLINDA: 0.14% risk for postoperative MACE  EKG -Normal sinus rhythm  Cannot rule out Anterior infarct , age undetermined  Abnormal ECG  When compared with ECG of 25-MAR-2019 08:57,  No significant change was found  Confirmed by Medhat Katz ([id number]) on 4/17/2024 3:20:53 PM    Pulmonary:  No diagnosis or significant findings on chart review or clinical presentation and evaluation.   ARISCAT: <26 points, 1.6% risk of in-hospital postoperative pulmonary complication  PRODIGY: Low risk for opioid induced respiratory depression  Smoking History-She has never smoked.  Discussed smoking cessation and deep breathing handout given    Renal/Urinary:  No diagnosis or significant findings on chart review or clinical presentation and evaluation, however, the patient is at increased risk of perioperative renal complications secondary to age>/= 56 and HTN. Preventative measures include BP monitoring, medication compliance, and hydration management.   CMP-Reviewed, stable  Creatinine-0.68  GFR->90    Endocrine:  No diagnosis or significant findings on chart review or clinical presentation and evaluation.   WCQ9W-1.5%    Hematologic/Immunology:  No diagnosis or significant findings on chart review or clinical presentation and evaluation.  The patient is not a Jehovah’s witness and will accept blood and blood products if medically indicated.   History of previous blood transfusions No  CBC-Reviewed, stable  Positive for Anemia-HGB 11.3. Normocytic, normochromic  Caprini Score 7, patient at High for postoperative DVT. Pt supplied education/VTE handout  Anticoagulation use: No     Gastrointestinal:   No significant findings on chart review or clinical presentation and evaluation.   History of GERD-Managed with  Omeprazole  Recreational drug use: Drug use No  Alcohol use social drinker    Infectious disease:   No diagnosis or significant findings on chart review or clinical presentation and evaluation.   Prescription provided for CHG body wash and dental rinse. CHG use instructions reviewed and provided to patient.  Staph screen collected-Negative    Musculoskeletal:   No diagnosis or significant findings on chart review or clinical presentation and evaluation.   JHFRAT score-3 points. low risk for falls    Anesthesia:  ASA 2 - Patient with mild systemic disease with no functional limitations  Anticipated anesthesia-Consult  History of General anesthesia-yes  Complications- Slow emergence  No family history of anesthesia complications    Abnormalities noted on PAT evaluation: No    Labs & Imaging ordered:  CBC, CMP, HBA1C, MRSA, EKG  Nickel/metal allergy-negative  Shellfish allergy-Negative  Face to face time-30 minutes

## 2024-11-26 ENCOUNTER — APPOINTMENT (OUTPATIENT)
Dept: PRIMARY CARE | Facility: CLINIC | Age: 59
End: 2024-11-26
Payer: COMMERCIAL

## 2024-11-27 LAB — STAPHYLOCOCCUS SPEC CULT: NORMAL

## 2024-12-02 ENCOUNTER — TELEPHONE (OUTPATIENT)
Dept: PRIMARY CARE | Facility: CLINIC | Age: 59
End: 2024-12-02

## 2024-12-02 ENCOUNTER — APPOINTMENT (OUTPATIENT)
Dept: PRIMARY CARE | Facility: CLINIC | Age: 59
End: 2024-12-02
Payer: COMMERCIAL

## 2024-12-02 VITALS
RESPIRATION RATE: 18 BRPM | BODY MASS INDEX: 27.46 KG/M2 | DIASTOLIC BLOOD PRESSURE: 80 MMHG | HEART RATE: 79 BPM | SYSTOLIC BLOOD PRESSURE: 128 MMHG | OXYGEN SATURATION: 98 % | HEIGHT: 63 IN | WEIGHT: 155 LBS

## 2024-12-02 DIAGNOSIS — I10 ESSENTIAL (PRIMARY) HYPERTENSION: ICD-10-CM

## 2024-12-02 DIAGNOSIS — Z12.31 ENCOUNTER FOR SCREENING MAMMOGRAM FOR BREAST CANCER: ICD-10-CM

## 2024-12-02 DIAGNOSIS — E78.00 PURE HYPERCHOLESTEROLEMIA: ICD-10-CM

## 2024-12-02 DIAGNOSIS — Z00.00 WELL ADULT EXAM: Primary | ICD-10-CM

## 2024-12-02 DIAGNOSIS — M65.311 TRIGGER FINGER OF RIGHT THUMB: ICD-10-CM

## 2024-12-02 DIAGNOSIS — K21.9 GASTRO-ESOPHAGEAL REFLUX DISEASE WITHOUT ESOPHAGITIS: ICD-10-CM

## 2024-12-02 PROBLEM — M75.22 BICEPS TENDINITIS, LEFT: Status: ACTIVE | Noted: 2023-08-17

## 2024-12-02 PROBLEM — G89.18 POSTOPERATIVE PAIN: Status: ACTIVE | Noted: 2024-12-02

## 2024-12-02 PROCEDURE — 99396 PREV VISIT EST AGE 40-64: CPT | Performed by: FAMILY MEDICINE

## 2024-12-02 PROCEDURE — 3079F DIAST BP 80-89 MM HG: CPT | Performed by: FAMILY MEDICINE

## 2024-12-02 PROCEDURE — 3074F SYST BP LT 130 MM HG: CPT | Performed by: FAMILY MEDICINE

## 2024-12-02 PROCEDURE — 1036F TOBACCO NON-USER: CPT | Performed by: FAMILY MEDICINE

## 2024-12-02 PROCEDURE — 99213 OFFICE O/P EST LOW 20 MIN: CPT | Performed by: FAMILY MEDICINE

## 2024-12-02 PROCEDURE — 3008F BODY MASS INDEX DOCD: CPT | Performed by: FAMILY MEDICINE

## 2024-12-02 RX ORDER — OMEPRAZOLE 20 MG/1
20 CAPSULE, DELAYED RELEASE ORAL EVERY OTHER DAY
Qty: 45 CAPSULE | Refills: 3 | Status: SHIPPED | OUTPATIENT
Start: 2024-12-02 | End: 2025-12-02

## 2024-12-02 RX ORDER — TRETINOIN 0.5 MG/G
CREAM TOPICAL
COMMUNITY
Start: 2024-09-18

## 2024-12-02 ASSESSMENT — PATIENT HEALTH QUESTIONNAIRE - PHQ9
1. LITTLE INTEREST OR PLEASURE IN DOING THINGS: NOT AT ALL
SUM OF ALL RESPONSES TO PHQ9 QUESTIONS 1 AND 2: 0
2. FEELING DOWN, DEPRESSED OR HOPELESS: NOT AT ALL

## 2024-12-02 ASSESSMENT — PROMIS GLOBAL HEALTH SCALE
RATE_MENTAL_HEALTH: GOOD
RATE_QUALITY_OF_LIFE: GOOD
RATE_GENERAL_HEALTH: GOOD
CARRYOUT_PHYSICAL_ACTIVITIES: MODERATELY
CARRYOUT_SOCIAL_ACTIVITIES: FAIR
RATE_AVERAGE_FATIGUE: MODERATE
RATE_PHYSICAL_HEALTH: GOOD
RATE_SOCIAL_SATISFACTION: GOOD
EMOTIONAL_PROBLEMS: RARELY
RATE_AVERAGE_PAIN: 5

## 2024-12-02 ASSESSMENT — PAIN SCALES - GENERAL: PAINLEVEL_OUTOF10: 5

## 2024-12-02 NOTE — PROGRESS NOTES
Subjective   Patient ID: Tee Gunter is a 59 y.o. female who presents for Annual Exam (Patient is here for his annual wellness exam ).    HPI   1. TEE is seen for for her comprehensive physical exam. PMH, PSH, family history and social history were reviewed and updated. Colonoscopy in 5/19 by Dr. Poon showed internal hemorrhoids.  GYN history -.  Sees a Gynecologist.     2. TEE is here also for follow up of benign essential hypertension.  She is on amlodipine 5 daily.  This was switched from losartan which was causing fatigue.  She feels that the amlodipine is making her slightly dizzy.    3. TEE is seen today also for follow up of allergies.  She takes Zyrtec and Singulair on a PRN basis.     4. TEE is seen today also for follow up of High cholesterol. She is on no medication. She was on Zetia but this was stopped by cardiologist. She did not tolerate Crestor (rash) lovastatin and atorvastatin (leg cramps) in the past. Recent LDL is 142.     5. TEE is seen for also for for GERD.    She is on omeprazole QD. She had stopped it in 2020 but her symptoms returned so started back up again. .     6. She is also here for follow up of being over weight. She was on Phentermine and topiramate for 3 months in early 2015. She lost 10 lbs. but gained it all back since.      7.  She is also here for anemia.  Recent hemoglobin is 11.3   In 2020 she had hemoglobin 10.1.  2 months later was repeated at 11.6.  She does donate blood about every 2 months.      8.    She is also here for degenerative joint disease.  She is status post left hip replacement in 2021. She has ongoing intermittent right and left hip pain and some back pain.  She is scheduled to have a right hip replaced within the next several weeks.    9.  She is also here for multiple arthralgias.  She is being followed by Dr. Miller.    10.  She is also here for trigger thumb of her right hand.  This started about a month ago.  No trauma.  She did mention to her  rheumatologist who recommended splinting.   Review of Systems  Denies headache, blurred vision, chest pain, shortness of breath, nausea or vomiting, change in bowel habits or leg pain or swelling.    Objective   LMP  (LMP Unknown) Comment: uterine ablasion, over 55    Physical Exam  General appearance: Comfortable.  She is well-nourished, well-developed.  She is awake, alert, and oriented and appears her stated age.The patient is cooperative with exam.  Head: Hair pattern reveals a normal pattern for patient's age and face shows no abnormalities.  eyes: PERRLA, EOMI x2, conjunctival and sclera are clear.   Nose: No polyps, ulcerations, or lesions.  Throat: Oral mucosa reveals no abnormalities and teeth are in good repair.  Neck:  Supple without lymphadenopathy.  No thyromegaly or carotid bruits.  Lungs: Clear to auscultation bilaterally with no wheezes, rales or rhonchi.  Chest Wall: No abnormalities  Abdomen: Abdomen is soft, nontender, no masses and no hepatosplenomegaly.  Lymph nodes: Bilateral axillary lymph nodes and inguinal nodes are unremarkable.  Musculoskeletal: Bilateral upper and lower extremities are equal in strength at 5/5.  Trigger finger noted in right thumb.  Skin: Good turgor and no rashes.  Neurological: Intact and nonfocal.  Cranial nerves II through XII are grossly intact.  Psychiatric: Patient has appropriate judgment.  Patient has good insight.  Patient's mood is appropriate.    Assessment/Plan   Problem List Items Addressed This Visit             ICD-10-CM       High    Essential (primary) hypertension I10     Continue amlodipine for now.  Since she is about ready to have hip surgery in 2 weeks will not change her blood pressure medication.  She will recheck with me in about 3 months.  Will recheck blood pressure and consider that time switching back to lisinopril or losartan which she inquired about.         Gastro-esophageal reflux disease without esophagitis K21.9     Continue omeprazole.   Recheck in 6 months.         Pure hypercholesterolemia E78.00     Will keep off medication since she has been intolerant of statins and Zetia.  Will return here in in 6 months and check cholesterol at that time.  In the meantime consider red rice yeast extract.  Improve low-fat diet.         Well adult exam - Primary Z00.00     Anticipatory guidance given.            Medium    Trigger finger of right thumb M65.311     Recommend splint continue splinting especially at night for now.  If no improvement over the next month or 2 she will let me know in that time would consider thumb injection.  Or referral.  She has seen Dr. Tyson in the past for shoulder surgery.          Other Visit Diagnoses         Codes    Encounter for screening mammogram for breast cancer     Z12.31

## 2024-12-03 ENCOUNTER — TREATMENT (OUTPATIENT)
Dept: PHYSICAL THERAPY | Facility: CLINIC | Age: 59
End: 2024-12-03
Payer: COMMERCIAL

## 2024-12-03 DIAGNOSIS — M19.012 PRIMARY OSTEOARTHRITIS, LEFT SHOULDER: ICD-10-CM

## 2024-12-03 DIAGNOSIS — Z96.612 S/P SHOULDER REPLACEMENT, LEFT: ICD-10-CM

## 2024-12-03 PROCEDURE — 97110 THERAPEUTIC EXERCISES: CPT | Mod: GP

## 2024-12-03 ASSESSMENT — PAIN - FUNCTIONAL ASSESSMENT: PAIN_FUNCTIONAL_ASSESSMENT: 0-10

## 2024-12-03 ASSESSMENT — PAIN SCALES - GENERAL: PAINLEVEL_OUTOF10: 0 - NO PAIN

## 2024-12-03 NOTE — PROGRESS NOTES
Physical Therapy Treatment/Discharge Summary    Patient Name: Tiki Gunter  MRN: [id number]  Encounter date: 12/3/2024    Time Calculation  Start Time: 0830  Stop Time: 0920  Time Calculation (min): 50 min  PT Therapeutic Procedures Time Entry  Therapeutic Exercise Time Entry: 40    Visit # 10 of 10  Visits/Dates Authorized: NO AUTH / [place] VISITS    Current Problem:   Problem List Items Addressed This Visit             ICD-10-CM    Primary osteoarthritis, left shoulder M19.012    S/P shoulder replacement, left Z96.612       Surgery: L TSR  Surgery date: 4/19/24     Precautions:    4/19/24 L TSR, anatomical (not reverse), advance R hip OA (NGUYEN planned), GERD, aortic aneurysm        Subjective   General:   General Comment: Hip very painful and limited shoulder HEP with having to use snowblower repeatedly recent days.    Pre-Treatment Symptoms:   Pain Assessment: 0-10  0-10 (Numeric) Pain Score: 0 - No pain (shoulder tight, not painful; does have hip pain)    Objective   Findings:    Limited scap stabilization but improving endurance  Heavily limping, relies on cane for unloading R LE  Other Measures  Other Outcome Measures: SPADI 4/130    Treatments:      Therapeutic Exercise: intermittent vc/tc for posture/technique, reviewed HEP  UBE seat 1: L5 5min   Scap press out at wall from forearms x25  F.T. upright row 5# 40  F.T. row 5# x52  F.T. shoulder ext 5# x34  Bilat flexion/scaption/abduction raises in succession 1# 1x12 ea  F.T. Tricep ext 5# 30  Tband ER peach to fatigue  Reviewed HEP and prep for NGUYEN in 2 weeks      HEP/Access Codes  Access Code: [email or web address] Date: 09/19/2024  - Superman  - 1-2 x daily - 1-3 sets - 10-30 reps - 0-5 hold  - Prone Scapular Slide with Shoulder Extension  - 1-2 x daily - 1-3 sets - 10-30 reps - 0-5 hold  - Prone Scapular Retraction Arms at Side  - 1-2 x daily - 1-3 sets - 10-30 reps - 0-5 hold  - Prone Scapular Retraction Y  - 1-2 x daily - 1-3 sets - 10-30 reps - 0-5 hold  - Sidelying  Shoulder ER with Towel and Dumbbell  - 1-2 x daily - 1-3 sets - 10-30 reps - 0-5 hold  - Seated Serratus Press with Anchored Resistance  - 1-2 x daily - 1-3 sets - 10-30 reps - 0-5 hold  - Serratus Forearm Push Up Plus at Wall with Elbows  - 1-2 x daily - 1-3 sets - 10-30 reps - 0-5 hold  Access Code: [email or web address] Date: 08/27/2024  - Shoulder extension with resistance - Neutral  - 1 x daily - 7 x weekly - 2-3 sets - 10-30 reps - 0-5 hold  - Scapular Retraction with Resistance  - 1 x daily - 7 x weekly - 2-3 sets - 10-30 reps - 0-5 hold  - Scaption with Resistance  - 1 x daily - 7 x weekly - 2-3 sets - 10-30 reps - 0-5 hold  - Standing Upright Shoulder Row with Resistance  - 1 x daily - 7 x weekly - 2-3 sets - 10-30 reps - 0-5 hold  - Walking Isometric Shoulder External Rotation with Anchored Resistance  - 1 x daily - 7 x weekly - 2-3 sets - 10-30 reps     Assessment:   PT Assessment  Assessment Comment: Co-morbidities are greater issue than L shoulder presently, having more confidence in L shoulder for using walker post op R THR in 2 wks. Increased stamina of scapulothoracic stabilizers. Further progress expected with HEP and UE demands for R THR    Post-Treatment Symptoms:   Response to Interventions: No change in pain      Plan:    Discharge to [facility], pt to contact dept for any needs.  Planned modality interventions: electrical stimulation/Russian stimulation and thermotherapy (hydrocollator packs)  Planned therapy interventions: manual therapy, postural training, strengthening, home exercise program, functional ROM exercises, flexibility and therapeutic activities  Frequency: 1x week  Duration in visits: 10  Treatment plan discussed with: patient    Goals:   Resolved       PT Problem       PT Goals (Met)       Start:  08/27/24    Expected End:  11/25/24    Resolved:  12/03/24    1) Indep HEP and progression.  2) SPADI < 10/130 to indicate improved function and pain, including dressing tasks, jaun bra.  3) Turn in  bed without difficulty maneuvering L UE.  4) Stabilize jars/packages with L hand to open them with R hand.  5) Complete yard work without pain exacerbation.  6) Symmetrical shoulder ROM to complete daily tasks without pinching L posterior shoulder.         Patient Stated Goals (Met)       Start:  08/27/24    Expected End:  11/25/24    Resolved:  12/03/24    1) Strengthen shoulder for daily function and R NGUYEN.

## 2024-12-10 ENCOUNTER — HOSPITAL ENCOUNTER (OUTPATIENT)
Dept: RADIOLOGY | Facility: HOSPITAL | Age: 59
Discharge: HOME | End: 2024-12-10
Payer: COMMERCIAL

## 2024-12-10 VITALS — HEIGHT: 63 IN | BODY MASS INDEX: 27.46 KG/M2 | WEIGHT: 155 LBS

## 2024-12-10 DIAGNOSIS — Z12.31 ENCOUNTER FOR SCREENING MAMMOGRAM FOR BREAST CANCER: ICD-10-CM

## 2024-12-10 PROCEDURE — 77067 SCR MAMMO BI INCL CAD: CPT | Performed by: RADIOLOGY

## 2024-12-10 PROCEDURE — 77063 BREAST TOMOSYNTHESIS BI: CPT | Performed by: RADIOLOGY

## 2024-12-10 PROCEDURE — 77063 BREAST TOMOSYNTHESIS BI: CPT

## 2024-12-15 ENCOUNTER — ANESTHESIA EVENT (OUTPATIENT)
Dept: OPERATING ROOM | Facility: HOSPITAL | Age: 59
End: 2024-12-15
Payer: COMMERCIAL

## 2024-12-15 PROBLEM — G89.18 ACUTE POST-OPERATIVE PAIN: Status: ACTIVE | Noted: 2024-12-15

## 2024-12-15 RX ORDER — OXYCODONE HYDROCHLORIDE 5 MG/1
5 TABLET ORAL EVERY 6 HOURS PRN
Qty: 28 TABLET | Refills: 0 | Status: SHIPPED | OUTPATIENT
Start: 2024-12-15 | End: 2024-12-23

## 2024-12-15 RX ORDER — MELOXICAM 15 MG/1
15 TABLET ORAL DAILY
Qty: 30 TABLET | Refills: 0 | Status: SHIPPED | OUTPATIENT
Start: 2024-12-15 | End: 2025-01-15

## 2024-12-15 RX ORDER — DOCUSATE SODIUM 100 MG/1
100 CAPSULE, LIQUID FILLED ORAL 2 TIMES DAILY
Qty: 60 CAPSULE | Refills: 0 | Status: SHIPPED | OUTPATIENT
Start: 2024-12-15 | End: 2025-01-15

## 2024-12-15 RX ORDER — ACETAMINOPHEN 500 MG
1000 TABLET ORAL EVERY 6 HOURS PRN
Qty: 240 TABLET | Refills: 0 | Status: SHIPPED | OUTPATIENT
Start: 2024-12-15 | End: 2025-01-14

## 2024-12-15 RX ORDER — TRAMADOL HYDROCHLORIDE 50 MG/1
50 TABLET ORAL EVERY 6 HOURS PRN
Qty: 28 TABLET | Refills: 0 | Status: SHIPPED | OUTPATIENT
Start: 2024-12-15 | End: 2024-12-23

## 2024-12-15 RX ORDER — ASPIRIN 81 MG/1
81 TABLET ORAL 2 TIMES DAILY
Qty: 60 TABLET | Refills: 0 | Status: SHIPPED | OUTPATIENT
Start: 2024-12-15 | End: 2025-01-15

## 2024-12-15 RX ORDER — POLYETHYLENE GLYCOL 3350 17 G/17G
17 POWDER, FOR SOLUTION ORAL DAILY
Qty: 510 G | Refills: 0 | Status: SHIPPED | OUTPATIENT
Start: 2024-12-15

## 2024-12-15 NOTE — DISCHARGE INSTRUCTIONS
MD Aleshia Bautista MPAS, SAMUEL, ATC  Adult Reconstruction and Joint Replacement Surgery  Phone: [phone]     Fax: [phone]        DISCHARGE INSTRUCTIONS      PLEASE READ CAREFULLY BEFORE CONTACTING YOUR PROVIDER.    WE WORK COLLABORATIVELY AS A TEAM. CALLING MULTIPLE STAFF MEMBERS REGARDING THE SAME ISSUE WILL DELAY YOUR CARE.    [employer] IS THE PREFERRED COMMUNICATION FOR ALL TEAM MEMBERS.    POSTOPERATIVE INSTRUCTIONS: TOTAL HIP & TOTAL KNEE ARTHROPLASTY    JOINT CARE TEAM  Please use the information below to contact your care team following surgery.  If you are leaving a message or using the [employer] Chart portal, please include your full name, date of birth and date of surgery so that we can correctly identify you.  Your call will be returned within 1-2 business days, please do not leave multiple messages with other staff regarding a single issue while you are awaiting a return call.     Who to call Contact Information Matters needing handled   Aleshia Medina PA-C  Physician Assistant indico Portal   Medical questions/concerns       Juana Molina-    [email or web address]           [phone]  opt. 2    [phone] fax  [phone]         Scheduling office Visits  Medical questions/concerns  Leave of Absence or other paperwork  Any concerns more than 6 weeks from surgery - an appointment will need to be made   Clarissa Saul MBA, BSN, RN-BC  Ortho Nurse Navigator [place]        __________________________________    Terry Villalta RN, BSN  Ortho Coordinator [place] FREDERICKN-BC  Ortho Nurse Navigator [place]       [phone] Please call staff at the institution in which you had surgery for prescription refills        Prescription Refills  Nursing, medical question related questions or concerns within 6 weeks of surgery   Orders for Outpatient Physical Therapy             MEDICATION  REFILLS - MyChart or Nurse Navigator (Above) at the institution in which you had surgery. [facility] [place] or [facility].    -You will NOT receive a call indicating that your prescription has been filled.  Please contact your pharmacy with any questions.    Medication refills will be filled Monday-Friday 7am to 1pm ONLY. Please call the nurse navigator office or send a [employer] message for a refill request.  Any requests received outside of this timeframe will be handled on the next business day.  Please do not call multiple times or call other members of the care team for medication needs, this will cause the refill to take longer.    Per State and Institutional policy, pain medications can only be refilled every 7 days for up to six weeks following surgery.    My Chart Portal: If you are using the My Chart portal and are requesting a medication refill, please list what type of surgery you had and left or right side, medication that needs refilled, and pharmacy you would like your medication sent.     WEIGHT BEARING- weight bearing as tolerated to operative extremity     ACTIVITY-As Tolerated    DRIVING & TRAVEL AFTER SURGERY   Patients should anticipate waiting at least 4-6 weeks before traveling long distances after surgery.  You will need to stop to walk around ever 1 hour during your travel to help with blood clot prevention.    Patients may not drive until cleared by the joint nurse or the office and you are off of all narcotics.    DENTAL PROCEDURES & CLEANINGS  You must wait a minimum of 3 months for elective dental appointments after a total joint replacement, including routine cleanings or dental work including bridges, crowns, extractions, etc.. Unless, it is an emergency. You will need a prophylactic antibiotic lifelong prior to any dental visit, cleaning or procedure. Your surgeon's office or your dentist may provide a prescription antibiotic. Antibiotics are a lifelong need before dental appointments.      You  do not need antibiotics for endoscopic procedures such as colonoscopy or EGD, dermatologic biopsies or eye surgeries.    WOUND CARE  If you experience continued drainage or bleeding, you may cover with abdominal/Maxi pads (purchase at local drug store).  Knee replacements should wrap with an ace wrap.  You may shower with waterproof dressing on. Your surgical bandage will be removed by your home therapist 1 week after surgery. If you have staples intact, home care will remove in 2 weeks. If you have sutures intact, you will need to return to the office in 2 weeks for suture removal. Once the dressing is removed by home care, you may continue to shower. Let soap and water wash over the wound. DO NOT SCRUB.  Steri-strips under the bandage will remain in place until they fall off on their own.  If they are loose, you may gently remove.  If they have not fallen off in two weeks, gently peel them off. Do not remove if pulling causes resistance against the incision.  You will see suture tails sticking out of the ends of the incision.  DO NOT CUT THEM.  They will fall off when the sutures dissolve.  If they are bothersome, cover with a band aid.  Do not soak in a bath tub, hot tub, pool or lake until you are 8 weeks out from surgery.  Do not apply lotions, creams or ointments until you are 6 weeks out from surgery,    PAIN, SWELLING, BRUISING & CLICKING  Pain and swelling are a natural part of your recovery which is considered normal for up to a year after surgery.  Symptoms may be treated with movement, ice, compression stockings, elevating your leg, and by following the pain medication regimen as prescribed.  Bruising is normal for several weeks after surgery. You may also have leg swelling and pain in your shin.  You may ice areas that are tender to help with discomfort.  You are required to wear the provided compression stockings, every day, for 4 weeks following surgery.  Remove the stockings at night and place them  back on in the morning.  Pain and swelling may temporarily increase with an increase in activity or exercise.  Use ice after activity.  Audible clicking with movement or exercises is considered normal following joint replacement.  If this persists at 6 months or 1 year, please notify your surgeon.  You may also feel decreased sensation or numbness near the incision site.  This is normal and sensation may or may not return.    PERSONAL HYGIENE  You may shower upon discharging from the hospital.  Soap and water is permitted to run over the surgical dressing, steri-strips and incision.  Do not scrub directly over these items.  DO NOT soak your incision in a bath, hot tub, pool or pond/lake for a minimum of 8 weeks following your surgery.  DO NOT use lotions, creams, ointments on your wound for a minimum of 6 weeks following your surgery. At that time you may use vitamin E to assist with softening of your incision.      RESTARTING HOME ROUTINE - DIET & MEDICATIONS  Post-operative constipation can result due to a combination of inactivity, anesthesia and pain medication. To help prevent this, you should increase your water and fiber intake. Physical activity such as walking will also help stimulate the bowels.   You may resume your normal diet when you discharge home.    To avoid constipation, choose foods that help promote good bowel habits, such as foods high in fiber.  You may restart your home medications the following day after your surgery UNLESS you have been given alternate instructions.  Follow the instructions given to you on your hospital discharge instructions for more information regarding your home medications.  IF YOU EXPERIENCE NAUSEA OR DIARRHEA, FOLLOW THE B.R.A.T. DIET UNTIL SYMPTOMS RESOLVE.  If you are experiencing vomiting that lasts more than 24 hours, please contact your joint nurse.      IN-HOME PHYSICAL THERAPY & OUTPATIENT PHYSICAL THERAPY  In-home physical therapy will start 1-2 days after you  get home from the hospital.    The home care agency will call within the first 24-48 hours to set up their first visit.  Please do not call your care team to inquire during this timeframe.  Continue the exercises you were given in the hospital until you have been seen by in-home therapy.  Make sure to provide a phone number with the ability for the home care staff to leave a message if you do not answer your phone.    Outpatient physical therapy following knee replacement surgery should begin 2-3 weeks after surgery.  You will be given physical therapy order prior to discharge from the hospital. You should call to schedule this appointment ASAP if not already scheduled before surgery.  Waiting until you are ready for outpatient physical therapy will cause a delay in your care.  You may choose any outpatient physical therapy location.      EMERGENCIES - WHEN TO CONTACT THE SURGEON'S OFFICE IMMEDIATELY  Fever >101 with chills that has been present for at least 48 hours.   Excessive bleeding from incision that will not slow down. A small amount of drainage is normal and expected.  Once pressure is applied and the area is covered, do not continue to check the area regularly.  This will remove pressure and bleeding will continue.  Leave in place for 4-6 hours.  Signs of infection of incision-excessive drainage that is soaking through your dressing (especially if it is pus-like), redness that is spreading out from the edges of your incision, or increased warmth around the area.  Excruciating pain for which the pain medication, taken as instructed, is not helping.  Severe calf pain.  Go directly to the emergency room or call 911, if you are experiencing chest pain or difficulty breathing.    After Hour and Weekend Emergency Answering Service [phone]    ICE & COLD THERAPY INSTRUCTIONS    To assist with pain control and post-op swelling, you should be using ice regularly throughout recovery, especially for the first 6  weeks, regardless of the cold therapy method you use.      Always make sure there is a layer of protection between the cold pad and your skin.    If you are using ICE PACKS or GEL PACKS, you will need to alternate 20 minutes on, 20 minutes off twice per hour.    If you are using an ICE MACHINE, please follow the provided ice machine instructions.  These devices differ from ice or ice packs whereas the mechanism circulates water through tubing and a pad to provide longer periods of cold therapy to the desired site.  You can use your cold devices around the clock for optimal comfort.  We recommend using cold therapy after working with therapy or completing exercises on your own.  There is no set schedule in which you must follow while using cold therapy.  Below are a few points to remember when using a cold therapy device:    You do not need to need to use the 20 on, 20 off method.  Detach the pad from the cooler and ambulate at least once every hour.  You can check your skin under the pad at this time.  You may wear the cold therapy device during periods of sleep including overnight.  If you wake up during the night, you can check the skin at this time.  You do not need to wake up specifically to perform skin checks.  Empty the cooler and pad when device is not in use.  Follow 's instructions for cleaning your cold therapy device.    DISCHARGE MEDICATIONS - Please reference the sample schedule on the reverse side for instructions on how to best schedule medications.    PAIN MEDICATION    ___X_ Tramadol / Oxycodone  Tramadol and Oxycodone have been prescribed for post-operative pain control.    These medications will only be refilled ONCE every 7 days for a period of up to 6 weeks following surgery.  After 6 weeks, you will transition to acetaminophen and over -the- counter anti-inflammatories such as Ibuprofen, Advil or Aleve in conjunction with ICE/COLD THERAPY.   Side effects may be constipation and  nausea, vomiting, sleepiness, dizziness, lightheadedness, headache, blurred vision, dry mouth sweating, itching (if you have itching, over-the -counter Benadryl can be used as needed).  You may NOT operate a motor vehicle while taking these medications or have been cleared by your care team.     ___X_ Acetaminophen (Tylenol)  Acetaminophen has been prescribed as an adjunct for pain control. Take two 500 mg tablets every 6 hours for 4 weeks. You will not receive a refill on this medication.  Do not exceed 4000mg of acetaminophen within a 24 hour period.  Side effects may include nausea, heartburn, drowsiness, and headache.    ___X_ Meloxicam (Mobic)-Meloxicam has been prescribed as an adjunct anti-inflammatory to assist in pain control.    Take one 15mg tablet once daily for 4 weeks.  You will not receive refills on this medication.   Side effects may include nausea.  May not be prescribed if you are on a more potent blood thinner than aspirin or have chronic kidney disease.    BLOOD THINNER    ___X_ Blood Thinner   A blood thinner has been prescribed to prevent blood clots in your leg or lungs. Take as prescribed on the bottle for 4 weeks. You will not receive a refill on this medication.    ANTI NAUSEA    ____ Proton Pump Inhibitor (PPI)-Stomach Acid Reduction Medication  If you are already on a PPI, you will continue your regular medication. If you are not, you will be prescribed Pantoprazole to help with nausea and protect your stomach while taking pain medication.  You will not receive a refill on this medication.    STOOL SOFTENERS    ___X_ Colace (Docusate Sodium) & Miralax (polyethylene glycol)  Take both medications to help with constipation while using the Oxycodone and Tramadol for pain control.  You will not receive a refill on this medication.    Continued Constipation  If you continue to be constipated despite daily use of Miralax and Colace, you try an over-the-counter Dulcolax Suppository and use per  instructions on the package.       SPECIAL INSTRUCTIONS   None    You will not receive refills on the following medications.   Acetaminophen (Tylenol  Meloxicam  Miralax  Colace  Proton Pump Inhibitor (PPI)  Blood Thinner    Pain Medication Refills - Ortho Nurse Navigator or [facility]- Monday through Friday 7am-1pm    FOLLOW-UP- You should have an appointment with either Dr. Hernandez or ANH Escobar in 6 weeks.         SAMPLE              The times below are an example of how to organize medications to optimize pain control  Your actual medication schedule may vary based on your last dose taken IN THE HOSPITAL      Time 3:00 am 6:00 am 9:00 am 12:00 pm 3:00 pm 6:00 pm 9:00 pm 12:00 am   Medications Tramadol Tylenol  Oxycodone  Miralax   Blood Thinner  Colace  Pantoprazole or other PPI  Tramadol  Meloxicam Tylenol  Oxycodone Tramadol Tylenol  Oxycodone  Miralax Blood Thinner  Colace  Tramadol   Tylenol  Oxycodone            You may begin to wean off the pain medication as your pain remains controlled with increased activity.  The schedules provided are meant to serve as an example.  You may wean off based on your pain control.  Please note that pain medications are not filled beyond 6 weeks after surgery.              The times below are an example of how to WEAN OFF medications WHILE CONTINUING TO OPTIMIZE PAIN CONTROL.  Your actual medication schedule may vary based on your last dose taken.    Time 12:00am 4:00am 8:00am 12:00pm 4:00pm 8:00pm   Med Tramadol Oxycodone   Tramadol Oxycodone Tramadol Oxycodone     Time 12:00am 6:00am 12:00pm 6:00pm   Med Tramadol Oxycodone   Tramadol Oxycodone     Time 12:00am 8:00am 4:00pm   Med Tramadol Oxycodone   Tramadol     Time 12:00am 12:00pm   Med Tramadol Tramadol         TOTAL KNEE REPLACEMENT PATIENTS SHOULD TRANSITION TO OUTPATIENT PHYSICAL THERAPY NO MORE THAN 3 WEEKS FOLLOWING SURGERY.  PLEASE SEE THE LIST OF  FACILITIES BELOW.  CALL TO SCHEDULE YOUR FIRST  APPOINTMENT BEFORE YOU HAVE YOUR SURGERY.

## 2024-12-16 ENCOUNTER — APPOINTMENT (OUTPATIENT)
Dept: RADIOLOGY | Facility: HOSPITAL | Age: 59
End: 2024-12-16
Payer: COMMERCIAL

## 2024-12-16 ENCOUNTER — DOCUMENTATION (OUTPATIENT)
Dept: HOME HEALTH SERVICES | Facility: HOME HEALTH | Age: 59
End: 2024-12-16

## 2024-12-16 ENCOUNTER — HOME HEALTH ADMISSION (OUTPATIENT)
Dept: HOME HEALTH SERVICES | Facility: HOME HEALTH | Age: 59
End: 2024-12-16
Payer: COMMERCIAL

## 2024-12-16 ENCOUNTER — PHARMACY VISIT (OUTPATIENT)
Dept: PHARMACY | Facility: CLINIC | Age: 59
End: 2024-12-16
Payer: COMMERCIAL

## 2024-12-16 ENCOUNTER — HOSPITAL ENCOUNTER (OUTPATIENT)
Facility: HOSPITAL | Age: 59
Setting detail: OUTPATIENT SURGERY
Discharge: HOME HEALTH | End: 2024-12-16
Attending: ORTHOPAEDIC SURGERY | Admitting: ORTHOPAEDIC SURGERY
Payer: COMMERCIAL

## 2024-12-16 ENCOUNTER — ANESTHESIA (OUTPATIENT)
Dept: OPERATING ROOM | Facility: HOSPITAL | Age: 59
End: 2024-12-16
Payer: COMMERCIAL

## 2024-12-16 VITALS
RESPIRATION RATE: 16 BRPM | OXYGEN SATURATION: 96 % | BODY MASS INDEX: 28.12 KG/M2 | WEIGHT: 158.73 LBS | DIASTOLIC BLOOD PRESSURE: 90 MMHG | TEMPERATURE: 97 F | HEART RATE: 78 BPM | SYSTOLIC BLOOD PRESSURE: 122 MMHG | HEIGHT: 63 IN

## 2024-12-16 DIAGNOSIS — M16.11 UNILATERAL PRIMARY OSTEOARTHRITIS, RIGHT HIP: ICD-10-CM

## 2024-12-16 DIAGNOSIS — G89.18 ACUTE POST-OPERATIVE PAIN: Primary | ICD-10-CM

## 2024-12-16 PROCEDURE — 2500000001 HC RX 250 WO HCPCS SELF ADMINISTERED DRUGS (ALT 637 FOR MEDICARE OP): Performed by: ANESTHESIOLOGY

## 2024-12-16 PROCEDURE — 97161 PT EVAL LOW COMPLEX 20 MIN: CPT | Mod: GP

## 2024-12-16 PROCEDURE — 7100000001 HC RECOVERY ROOM TIME - INITIAL BASE CHARGE: Performed by: ORTHOPAEDIC SURGERY

## 2024-12-16 PROCEDURE — 3600000005 HC OR TIME - INITIAL BASE CHARGE - PROCEDURE LEVEL FIVE: Performed by: ORTHOPAEDIC SURGERY

## 2024-12-16 PROCEDURE — 7100000009 HC PHASE TWO TIME - INITIAL BASE CHARGE: Performed by: ORTHOPAEDIC SURGERY

## 2024-12-16 PROCEDURE — A27130 PR TOTAL HIP ARTHROPLASTY: Performed by: ANESTHESIOLOGY

## 2024-12-16 PROCEDURE — 2500000004 HC RX 250 GENERAL PHARMACY W/ HCPCS (ALT 636 FOR OP/ED): Performed by: ANESTHESIOLOGIST ASSISTANT

## 2024-12-16 PROCEDURE — 2720000007 HC OR 272 NO HCPCS: Performed by: ORTHOPAEDIC SURGERY

## 2024-12-16 PROCEDURE — C1713 ANCHOR/SCREW BN/BN,TIS/BN: HCPCS | Performed by: ORTHOPAEDIC SURGERY

## 2024-12-16 PROCEDURE — 7100000002 HC RECOVERY ROOM TIME - EACH INCREMENTAL 1 MINUTE: Performed by: ORTHOPAEDIC SURGERY

## 2024-12-16 PROCEDURE — 3600000010 HC OR TIME - EACH INCREMENTAL 1 MINUTE - PROCEDURE LEVEL FIVE: Performed by: ORTHOPAEDIC SURGERY

## 2024-12-16 PROCEDURE — 3700000002 HC GENERAL ANESTHESIA TIME - EACH INCREMENTAL 1 MINUTE: Performed by: ORTHOPAEDIC SURGERY

## 2024-12-16 PROCEDURE — 2500000004 HC RX 250 GENERAL PHARMACY W/ HCPCS (ALT 636 FOR OP/ED): Performed by: ORTHOPAEDIC SURGERY

## 2024-12-16 PROCEDURE — A27130 PR TOTAL HIP ARTHROPLASTY: Performed by: ANESTHESIOLOGIST ASSISTANT

## 2024-12-16 PROCEDURE — 72170 X-RAY EXAM OF PELVIS: CPT

## 2024-12-16 PROCEDURE — 7100000010 HC PHASE TWO TIME - EACH INCREMENTAL 1 MINUTE: Performed by: ORTHOPAEDIC SURGERY

## 2024-12-16 PROCEDURE — 3700000001 HC GENERAL ANESTHESIA TIME - INITIAL BASE CHARGE: Performed by: ORTHOPAEDIC SURGERY

## 2024-12-16 PROCEDURE — RXMED WILLOW AMBULATORY MEDICATION CHARGE

## 2024-12-16 PROCEDURE — 2500000004 HC RX 250 GENERAL PHARMACY W/ HCPCS (ALT 636 FOR OP/ED): Mod: JZ | Performed by: PHYSICIAN ASSISTANT

## 2024-12-16 PROCEDURE — 97110 THERAPEUTIC EXERCISES: CPT | Mod: GP

## 2024-12-16 PROCEDURE — 27130 TOTAL HIP ARTHROPLASTY: CPT | Performed by: ORTHOPAEDIC SURGERY

## 2024-12-16 PROCEDURE — 2500000005 HC RX 250 GENERAL PHARMACY W/O HCPCS: Performed by: PHYSICIAN ASSISTANT

## 2024-12-16 PROCEDURE — 2500000005 HC RX 250 GENERAL PHARMACY W/O HCPCS: Performed by: ANESTHESIOLOGIST ASSISTANT

## 2024-12-16 PROCEDURE — 72170 X-RAY EXAM OF PELVIS: CPT | Performed by: RADIOLOGY

## 2024-12-16 PROCEDURE — 97116 GAIT TRAINING THERAPY: CPT | Mod: GP

## 2024-12-16 PROCEDURE — A4649 SURGICAL SUPPLIES: HCPCS | Performed by: ORTHOPAEDIC SURGERY

## 2024-12-16 PROCEDURE — 2500000001 HC RX 250 WO HCPCS SELF ADMINISTERED DRUGS (ALT 637 FOR MEDICARE OP): Performed by: PHYSICIAN ASSISTANT

## 2024-12-16 PROCEDURE — C1776 JOINT DEVICE (IMPLANTABLE): HCPCS | Performed by: ORTHOPAEDIC SURGERY

## 2024-12-16 PROCEDURE — 2500000004 HC RX 250 GENERAL PHARMACY W/ HCPCS (ALT 636 FOR OP/ED): Performed by: PHYSICIAN ASSISTANT

## 2024-12-16 PROCEDURE — 2780000003 HC OR 278 NO HCPCS: Performed by: ORTHOPAEDIC SURGERY

## 2024-12-16 DEVICE — PINNACLE CANCELLOUS BONE SCREW 6.5MM X 25MM
Type: IMPLANTABLE DEVICE | Site: HIP | Status: FUNCTIONAL
Brand: PINNACLE

## 2024-12-16 DEVICE — SUMMIT FEMORAL STEM 12/14 TAPER TAPER ED W/POROCOAT SIZE 4 HI 140MM
Type: IMPLANTABLE DEVICE | Site: HIP | Status: FUNCTIONAL
Brand: SUMMIT POROCOAT

## 2024-12-16 DEVICE — M-SPEC METAL FEMORAL HEAD 12/14 TAPER DIAMETER 36MM -2: Type: IMPLANTABLE DEVICE | Site: HIP | Status: FUNCTIONAL

## 2024-12-16 DEVICE — PINNACLE GRIPTION ACETABULAR SHELL SECTOR 54MM OD
Type: IMPLANTABLE DEVICE | Site: HIP | Status: FUNCTIONAL
Brand: PINNACLE GRIPTION

## 2024-12-16 DEVICE — PINNACLE HIP SOLUTIONS ALTRX POLYETHYLENE ACETABULAR LINER NEUTRAL 36MM ID 54MM OD
Type: IMPLANTABLE DEVICE | Site: HIP | Status: FUNCTIONAL
Brand: PINNACLE ALTRX

## 2024-12-16 DEVICE — PINNACLE CANCELLOUS BONE SCREW 6.5MM X 30MM
Type: IMPLANTABLE DEVICE | Site: HIP | Status: FUNCTIONAL
Brand: PINNACLE

## 2024-12-16 RX ORDER — CHOLECALCIFEROL (VITAMIN D3) 25 MCG
2000 TABLET ORAL DAILY
Status: DISCONTINUED | OUTPATIENT
Start: 2024-12-17 | End: 2024-12-16 | Stop reason: HOSPADM

## 2024-12-16 RX ORDER — OXYCODONE HCL 10 MG/1
10 TABLET, FILM COATED, EXTENDED RELEASE ORAL ONCE
Status: COMPLETED | OUTPATIENT
Start: 2024-12-16 | End: 2024-12-16

## 2024-12-16 RX ORDER — CEFAZOLIN SODIUM 2 G/100ML
2 INJECTION, SOLUTION INTRAVENOUS EVERY 8 HOURS
Status: DISCONTINUED | OUTPATIENT
Start: 2024-12-16 | End: 2024-12-16 | Stop reason: HOSPADM

## 2024-12-16 RX ORDER — DOCUSATE SODIUM 100 MG/1
100 CAPSULE, LIQUID FILLED ORAL 2 TIMES DAILY
Status: DISCONTINUED | OUTPATIENT
Start: 2024-12-16 | End: 2024-12-16 | Stop reason: HOSPADM

## 2024-12-16 RX ORDER — PANTOPRAZOLE SODIUM 40 MG/1
40 TABLET, DELAYED RELEASE ORAL
Status: DISCONTINUED | OUTPATIENT
Start: 2024-12-17 | End: 2024-12-16 | Stop reason: HOSPADM

## 2024-12-16 RX ORDER — MEPERIDINE HYDROCHLORIDE 25 MG/ML
12.5 INJECTION INTRAMUSCULAR; INTRAVENOUS; SUBCUTANEOUS EVERY 10 MIN PRN
Status: DISCONTINUED | OUTPATIENT
Start: 2024-12-16 | End: 2024-12-16 | Stop reason: HOSPADM

## 2024-12-16 RX ORDER — ALBUTEROL SULFATE 0.83 MG/ML
2.5 SOLUTION RESPIRATORY (INHALATION) ONCE AS NEEDED
Status: DISCONTINUED | OUTPATIENT
Start: 2024-12-16 | End: 2024-12-16 | Stop reason: HOSPADM

## 2024-12-16 RX ORDER — TRANEXAMIC ACID 100 MG/ML
INJECTION, SOLUTION INTRAVENOUS AS NEEDED
Status: DISCONTINUED | OUTPATIENT
Start: 2024-12-16 | End: 2024-12-16

## 2024-12-16 RX ORDER — ASPIRIN 81 MG/1
81 TABLET ORAL 2 TIMES DAILY
Status: DISCONTINUED | OUTPATIENT
Start: 2024-12-16 | End: 2024-12-16 | Stop reason: HOSPADM

## 2024-12-16 RX ORDER — IPRATROPIUM BROMIDE 0.5 MG/2.5ML
500 SOLUTION RESPIRATORY (INHALATION) ONCE
Status: DISCONTINUED | OUTPATIENT
Start: 2024-12-16 | End: 2024-12-16 | Stop reason: HOSPADM

## 2024-12-16 RX ORDER — ROPIVACAINE/EPI/CLONIDINE/KET 2.46-0.005
SYRINGE (ML) INJECTION AS NEEDED
Status: DISCONTINUED | OUTPATIENT
Start: 2024-12-16 | End: 2024-12-16 | Stop reason: HOSPADM

## 2024-12-16 RX ORDER — ONDANSETRON HYDROCHLORIDE 2 MG/ML
4 INJECTION, SOLUTION INTRAVENOUS ONCE AS NEEDED
Status: DISCONTINUED | OUTPATIENT
Start: 2024-12-16 | End: 2024-12-16 | Stop reason: HOSPADM

## 2024-12-16 RX ORDER — METOCLOPRAMIDE 10 MG/1
10 TABLET ORAL ONCE
Status: COMPLETED | OUTPATIENT
Start: 2024-12-16 | End: 2024-12-16

## 2024-12-16 RX ORDER — ONDANSETRON HYDROCHLORIDE 2 MG/ML
INJECTION, SOLUTION INTRAVENOUS AS NEEDED
Status: DISCONTINUED | OUTPATIENT
Start: 2024-12-16 | End: 2024-12-16

## 2024-12-16 RX ORDER — PREGABALIN 75 MG/1
75 CAPSULE ORAL ONCE
Status: COMPLETED | OUTPATIENT
Start: 2024-12-16 | End: 2024-12-16

## 2024-12-16 RX ORDER — CETIRIZINE HYDROCHLORIDE 10 MG/1
10 TABLET ORAL DAILY
Status: DISCONTINUED | OUTPATIENT
Start: 2024-12-17 | End: 2024-12-16 | Stop reason: HOSPADM

## 2024-12-16 RX ORDER — PHENYLEPHRINE HCL IN 0.9% NACL 1 MG/10 ML
SYRINGE (ML) INTRAVENOUS AS NEEDED
Status: DISCONTINUED | OUTPATIENT
Start: 2024-12-16 | End: 2024-12-16

## 2024-12-16 RX ORDER — DIPHENHYDRAMINE HYDROCHLORIDE 50 MG/ML
12.5 INJECTION INTRAMUSCULAR; INTRAVENOUS ONCE AS NEEDED
Status: DISCONTINUED | OUTPATIENT
Start: 2024-12-16 | End: 2024-12-16 | Stop reason: HOSPADM

## 2024-12-16 RX ORDER — POLYETHYLENE GLYCOL 3350 17 G/17G
17 POWDER, FOR SOLUTION ORAL DAILY
Status: DISCONTINUED | OUTPATIENT
Start: 2024-12-16 | End: 2024-12-16 | Stop reason: HOSPADM

## 2024-12-16 RX ORDER — ACETAMINOPHEN 325 MG/1
975 TABLET ORAL ONCE
Status: COMPLETED | OUTPATIENT
Start: 2024-12-16 | End: 2024-12-16

## 2024-12-16 RX ORDER — BISACODYL 10 MG/1
10 SUPPOSITORY RECTAL DAILY PRN
Status: DISCONTINUED | OUTPATIENT
Start: 2024-12-16 | End: 2024-12-16 | Stop reason: HOSPADM

## 2024-12-16 RX ORDER — ACETAMINOPHEN 325 MG/1
650 TABLET ORAL EVERY 4 HOURS PRN
Status: DISCONTINUED | OUTPATIENT
Start: 2024-12-16 | End: 2024-12-16 | Stop reason: HOSPADM

## 2024-12-16 RX ORDER — METOCLOPRAMIDE 10 MG/1
10 TABLET ORAL EVERY 6 HOURS PRN
Status: DISCONTINUED | OUTPATIENT
Start: 2024-12-16 | End: 2024-12-16 | Stop reason: HOSPADM

## 2024-12-16 RX ORDER — CEFAZOLIN SODIUM 2 G/100ML
2 INJECTION, SOLUTION INTRAVENOUS ONCE
Status: COMPLETED | OUTPATIENT
Start: 2024-12-16 | End: 2024-12-16

## 2024-12-16 RX ORDER — METOCLOPRAMIDE HYDROCHLORIDE 5 MG/ML
10 INJECTION INTRAMUSCULAR; INTRAVENOUS EVERY 6 HOURS PRN
Status: DISCONTINUED | OUTPATIENT
Start: 2024-12-16 | End: 2024-12-16 | Stop reason: HOSPADM

## 2024-12-16 RX ORDER — PROPOFOL 10 MG/ML
INJECTION, EMULSION INTRAVENOUS AS NEEDED
Status: DISCONTINUED | OUTPATIENT
Start: 2024-12-16 | End: 2024-12-16

## 2024-12-16 RX ORDER — FAMOTIDINE 20 MG/1
20 TABLET, FILM COATED ORAL ONCE
Status: COMPLETED | OUTPATIENT
Start: 2024-12-16 | End: 2024-12-16

## 2024-12-16 RX ORDER — CYCLOBENZAPRINE HCL 10 MG
5 TABLET ORAL 3 TIMES DAILY PRN
Status: DISCONTINUED | OUTPATIENT
Start: 2024-12-16 | End: 2024-12-16 | Stop reason: HOSPADM

## 2024-12-16 RX ORDER — SCOPOLAMINE 1 MG/3D
1 PATCH, EXTENDED RELEASE TRANSDERMAL
Status: DISCONTINUED | OUTPATIENT
Start: 2024-12-16 | End: 2024-12-16 | Stop reason: HOSPADM

## 2024-12-16 RX ORDER — ONDANSETRON HYDROCHLORIDE 2 MG/ML
4 INJECTION, SOLUTION INTRAVENOUS EVERY 8 HOURS PRN
Status: DISCONTINUED | OUTPATIENT
Start: 2024-12-16 | End: 2024-12-16 | Stop reason: HOSPADM

## 2024-12-16 RX ORDER — SODIUM CHLORIDE, SODIUM LACTATE, POTASSIUM CHLORIDE, CALCIUM CHLORIDE 600; 310; 30; 20 MG/100ML; MG/100ML; MG/100ML; MG/100ML
INJECTION, SOLUTION INTRAVENOUS CONTINUOUS PRN
Status: DISCONTINUED | OUTPATIENT
Start: 2024-12-16 | End: 2024-12-16

## 2024-12-16 RX ORDER — BISACODYL 5 MG
10 TABLET, DELAYED RELEASE (ENTERIC COATED) ORAL DAILY PRN
Status: DISCONTINUED | OUTPATIENT
Start: 2024-12-16 | End: 2024-12-16 | Stop reason: HOSPADM

## 2024-12-16 RX ORDER — NALOXONE HYDROCHLORIDE 0.4 MG/ML
0.2 INJECTION, SOLUTION INTRAMUSCULAR; INTRAVENOUS; SUBCUTANEOUS EVERY 5 MIN PRN
Status: DISCONTINUED | OUTPATIENT
Start: 2024-12-16 | End: 2024-12-16 | Stop reason: HOSPADM

## 2024-12-16 RX ORDER — KETOROLAC TROMETHAMINE 15 MG/ML
15 INJECTION, SOLUTION INTRAMUSCULAR; INTRAVENOUS EVERY 6 HOURS
Status: DISCONTINUED | OUTPATIENT
Start: 2024-12-16 | End: 2024-12-16 | Stop reason: HOSPADM

## 2024-12-16 RX ORDER — FENTANYL CITRATE 50 UG/ML
25 INJECTION, SOLUTION INTRAMUSCULAR; INTRAVENOUS EVERY 5 MIN PRN
Status: DISCONTINUED | OUTPATIENT
Start: 2024-12-16 | End: 2024-12-16 | Stop reason: HOSPADM

## 2024-12-16 RX ORDER — MELOXICAM 7.5 MG/1
7.5 TABLET ORAL ONCE
Status: COMPLETED | OUTPATIENT
Start: 2024-12-16 | End: 2024-12-16

## 2024-12-16 RX ORDER — AMLODIPINE BESYLATE 5 MG/1
5 TABLET ORAL DAILY
Status: DISCONTINUED | OUTPATIENT
Start: 2024-12-17 | End: 2024-12-16 | Stop reason: HOSPADM

## 2024-12-16 RX ORDER — MIDAZOLAM HYDROCHLORIDE 1 MG/ML
INJECTION, SOLUTION INTRAMUSCULAR; INTRAVENOUS AS NEEDED
Status: DISCONTINUED | OUTPATIENT
Start: 2024-12-16 | End: 2024-12-16

## 2024-12-16 RX ORDER — ONDANSETRON 4 MG/1
4 TABLET, ORALLY DISINTEGRATING ORAL EVERY 8 HOURS PRN
Status: DISCONTINUED | OUTPATIENT
Start: 2024-12-16 | End: 2024-12-16 | Stop reason: HOSPADM

## 2024-12-16 RX ORDER — OXYCODONE HYDROCHLORIDE 5 MG/1
5 TABLET ORAL EVERY 4 HOURS PRN
Status: DISCONTINUED | OUTPATIENT
Start: 2024-12-16 | End: 2024-12-16 | Stop reason: HOSPADM

## 2024-12-16 RX ORDER — HYDRALAZINE HYDROCHLORIDE 20 MG/ML
5 INJECTION INTRAMUSCULAR; INTRAVENOUS EVERY 30 MIN PRN
Status: DISCONTINUED | OUTPATIENT
Start: 2024-12-16 | End: 2024-12-16 | Stop reason: HOSPADM

## 2024-12-16 RX ORDER — TRANEXAMIC ACID 650 MG/1
1950 TABLET ORAL ONCE
Status: DISCONTINUED | OUTPATIENT
Start: 2024-12-16 | End: 2024-12-16 | Stop reason: HOSPADM

## 2024-12-16 RX ORDER — OXYCODONE HYDROCHLORIDE 5 MG/1
10 TABLET ORAL EVERY 4 HOURS PRN
Status: DISCONTINUED | OUTPATIENT
Start: 2024-12-16 | End: 2024-12-16 | Stop reason: HOSPADM

## 2024-12-16 RX ORDER — HYDROMORPHONE HYDROCHLORIDE 0.2 MG/ML
0.2 INJECTION INTRAMUSCULAR; INTRAVENOUS; SUBCUTANEOUS EVERY 5 MIN PRN
Status: DISCONTINUED | OUTPATIENT
Start: 2024-12-16 | End: 2024-12-16 | Stop reason: HOSPADM

## 2024-12-16 RX ORDER — OXYCODONE HYDROCHLORIDE 5 MG/1
5 TABLET ORAL EVERY 6 HOURS PRN
Status: DISCONTINUED | OUTPATIENT
Start: 2024-12-16 | End: 2024-12-16 | Stop reason: HOSPADM

## 2024-12-16 SDOH — HEALTH STABILITY: MENTAL HEALTH: CURRENT SMOKER: 0

## 2024-12-16 ASSESSMENT — PAIN - FUNCTIONAL ASSESSMENT
PAIN_FUNCTIONAL_ASSESSMENT: FLACC (FACE, LEGS, ACTIVITY, CRY, CONSOLABILITY)
PAIN_FUNCTIONAL_ASSESSMENT: 0-10

## 2024-12-16 ASSESSMENT — COGNITIVE AND FUNCTIONAL STATUS - GENERAL
MOBILITY SCORE: 22
MOVING FROM LYING ON BACK TO SITTING ON SIDE OF FLAT BED WITH BEDRAILS: A LITTLE
TURNING FROM BACK TO SIDE WHILE IN FLAT BAD: A LITTLE

## 2024-12-16 ASSESSMENT — PAIN DESCRIPTION - DESCRIPTORS
DESCRIPTORS: ACHING;PRESSURE
DESCRIPTORS: ACHING

## 2024-12-16 ASSESSMENT — COLUMBIA-SUICIDE SEVERITY RATING SCALE - C-SSRS
1. IN THE PAST MONTH, HAVE YOU WISHED YOU WERE DEAD OR WISHED YOU COULD GO TO SLEEP AND NOT WAKE UP?: NO
6. HAVE YOU EVER DONE ANYTHING, STARTED TO DO ANYTHING, OR PREPARED TO DO ANYTHING TO END YOUR LIFE?: NO
2. HAVE YOU ACTUALLY HAD ANY THOUGHTS OF KILLING YOURSELF?: NO
1. IN THE PAST MONTH, HAVE YOU WISHED YOU WERE DEAD OR WISHED YOU COULD GO TO SLEEP AND NOT WAKE UP?: NO
2. HAVE YOU ACTUALLY HAD ANY THOUGHTS OF KILLING YOURSELF?: NO
6. HAVE YOU EVER DONE ANYTHING, STARTED TO DO ANYTHING, OR PREPARED TO DO ANYTHING TO END YOUR LIFE?: NO

## 2024-12-16 ASSESSMENT — PAIN SCALES - GENERAL
PAIN_LEVEL: 0
PAINLEVEL_OUTOF10: 5 - MODERATE PAIN
PAINLEVEL_OUTOF10: 5 - MODERATE PAIN
PAINLEVEL_OUTOF10: 0 - NO PAIN
PAINLEVEL_OUTOF10: 5 - MODERATE PAIN

## 2024-12-16 ASSESSMENT — PAIN SCALES - PAIN ASSESSMENT IN ADVANCED DEMENTIA (PAINAD): TOTALSCORE: MEDICATION (SEE MAR)

## 2024-12-16 ASSESSMENT — PAIN DESCRIPTION - ORIENTATION: ORIENTATION: RIGHT

## 2024-12-16 ASSESSMENT — ACTIVITIES OF DAILY LIVING (ADL): ADL_ASSISTANCE: INDEPENDENT

## 2024-12-16 ASSESSMENT — PAIN DESCRIPTION - LOCATION: LOCATION: HIP

## 2024-12-16 NOTE — ANESTHESIA PROCEDURE NOTES
Spinal Block    Patient location during procedure: OR  Start time: 12/16/2024 9:00 AM  End time: 12/16/2024 9:10 AM  Reason for block: primary anesthetic  Staffing  Performed: FRANK and MARILYN   Authorized by: El Yadav MD    Performed by: FRANK Muse    Preanesthetic Checklist  Completed: patient identified, IV checked, risks and benefits discussed, surgical consent, monitors and equipment checked, pre-op evaluation, timeout performed and sterile techniques followed  Block Timeout  RN/Licensed healthcare professional reads aloud to the Anesthesia provider and entire team: Patient identity, procedure with side and site, patient position, and as applicable the availability of implants/special equipment/special requirements.    Timeout performed at: 12/16/2024 9:00 AM  Spinal Block  Patient position: sitting  Prep: Betadine  Sterility prep: cap, drape, gloves, hand hygiene and mask  Sedation level: light sedation  Patient monitoring: blood pressure, continuous pulse oximetry and heart rate  Approach: midline  Vertebral space: L3-4  Injection technique: single-shot  Needle  Needle type: pencil-point   Needle gauge: 25 G  Needle length: 3.5 in  Free flowing CSF: yes    Assessment  Sensory level: T6 bilateral  Procedure assessment: patient sedated but conversant throughout procedure and patient tolerated procedure well with no immediate complications  Additional Notes  3.6ml 1.5% mepivicaine pf injected  Spinal kit exp: 06-  Lot# [id number]

## 2024-12-16 NOTE — OP NOTE
Hip Replacement Total Uncement Unilat *Rapid Recovery* (R) Operative Note     Date: 2024  OR Location: [facility]    Name: Tiki Gunter, : 1965, Age: 59 y.o., MRN: [id number], Sex: female    Diagnosis  Pre-op Diagnosis      * Unilateral primary osteoarthritis, right hip [M16.11] Post-op Diagnosis     * Unilateral primary osteoarthritis, right hip [M16.11]     Procedures  Hip Replacement Total Uncement Unilat *Rapid Recovery*  17336 - SC ARTHRP ACETBLR/PROX FEM PROSTC AGRFT/ALGRFT      Surgeons      * Nate Hernandez - Primary    Resident/Fellow/Other Assistant:  Surgeons and Role:  * No surgeons found with a matching role *    Staff:   Carlos Manuelulator: Jaki Samuel Person: Margarette Samuel Person: Mi    Anesthesia Staff: Anesthesiologist: El Yadav MD  C-AA: FRANK Muse  AMIRAH: Juan Francisco Domingo    Procedure Summary  Anesthesia: Spinal  ASA: III  Estimated Blood Loss: 100mL  Intra-op Medications:   Administrations occurring from 0805 to 1035 on 24:   Medication Name Total Dose   ropivacaine-epinephrine-clonidine-ketorolac 2.46-0.005- 0.0008-0.3mg/mL periarticular syringe 50 mL   dexAMETHasone (Decadron) injection 4 mg/mL 4 mg   LR infusion Cannot be calculated   mepivacaine (Carbocaine) injection 1.5 % 3.6 mL   midazolam (Versed) injection 1 mg/mL 4 mg   phenylephrine 100 mcg/mL syringe 10 mL (prefilled) 700 mcg   propofol (Diprivan) injection 10 mg/mL 413.68 mg   tranexamic acid (Cyklokapron) injection 1,000 mg   ceFAZolin (Ancef) 2 g in dextrose (iso)  mL 2 g              Anesthesia Record               Intraprocedure I/O Totals          Intake    Tranexamic Acid 0.00 mL    The total shown is the total volume documented since Anesthesia Start was filed.    Total Intake 0 mL       Output    Est. Blood Loss 100 mL    Total Output 100 mL       Net    Net Volume -100 mL          Specimen: No specimens collected              Drains and/or Catheters: * None in log *    Tourniquet  Times:         Implants:  Implants       Type Name Action Serial No.      Joint Hip ACETABULAR CUP, SECTOR, GRIPTON, SIZE 54MM - [id number] Implanted      Screw SCREW CANCELLOUS 6.5 X 30 - [id number] Implanted      Screw SCREW CANCELLOUS 6.5 X 25 - [id number] Implanted      Joint Hip HIP STEM, SUMMIT POR 4 HI - [id number] Implanted      Joint HIP BALL, ARTIC/STORMY ZIRC 36-2 - [id number] Implanted      Joint Hip LINER, ALTRX, NEURTAL, 36 X 54MM - [id number] Implanted               Findings: advanced OA    Indications: Tiki Gunter is an 59 y.o. female who is having surgery for Unilateral primary osteoarthritis, right hip [M16.11].     The patient was seen in the preoperative area. The risks, benefits, complications, treatment options, non-operative alternatives, expected recovery and outcomes were discussed with the patient. The possibilities of reaction to medication, pulmonary aspiration, injury to surrounding structures, bleeding, recurrent infection, the need for additional procedures, failure to diagnose a condition, and creating a complication requiring transfusion or operation were discussed with the patient. The patient concurred with the proposed plan, giving informed consent.  The site of surgery was properly noted/marked if necessary per policy. The patient has been actively warmed in preoperative area. Preoperative antibiotics have been ordered and given within 1 hours of incision. Venous thrombosis prophylaxis have been ordered including bilateral sequential compression devices    Procedure Details: R NGUYEN  Complications:  None; patient tolerated the procedure well.    Disposition: PACU - hemodynamically stable.  Condition: stable     PREOPERATIVE DIAGNOSIS:  right hip osteoarthritis     POSTOPERATIVE DIAGNOSIS:  right hip osteoarthritis     OPERATION/PROCEDURE:  right total hip arthroplasty     SURGEON:  Nate Hernandez MD     ASSISTANT(S):  Xiang Christian MD PGY4     ANESTHESIA:  Spinal      ESTIMATED BLOOD LOSS AND INTRAVENOUS FLUIDS:  Please see Anesthesia record     LOCATION:  [facility]     COMPONENTS USED:  1.  [place] Gription acetabular sector shell 54  2.  [place] femoral stem tapered with Porocoat, size 4 HI  3.  [place] AltrX polyethylene acetabular liner, neutral, 36/54  4.  M-spec metal femoral head -2     BRIEF CLINICAL NOTE:  The patient is a 58 yo male with severe radiographic  osteoarthritis of the right hip.  They failed conservative treatment  and wished to proceed with total hip arthroplasty, which is indicated  at this time.  We discussed the risks, benefits, alternatives of  surgery including, but not limited to, infection, damage to vessel or  nerve, bleeding, soft tissue pain, DVT, PE, problems with anesthesia,  leg length discrepancy, dislocation, continued soft tissue pain, lack  of range of motion, need for further surgery, etc.  Consent was  obtained.  They were taken to the operating room in order to undergo  the procedure.     OPERATIVE REPORT:  The patient was transferred to the operating room table.  Time-out  was performed confirming patient name, medical record number,  surgical site, and adequate and appropriate imaging.  The patient  received appropriate IV antibiotics as well as tranexamic acid prior  to the start of the procedure.  Once we prepped and draped,  posterolateral approach to the hip was performed.  The skin and  subcutaneous tissues were incised sharply.  Hemostasis was obtained  using electrocautery.  The underlying gluteal fascia was identified  and entered using electrocautery followed by Fatima scissors.  Charnley  bow was placed.  The short external rotators and capsule were taken  down in one piece and tagged for later reapproximation making sure to protect the sciatic nerve.  The hip was dislocated.  Provisional femoral neck cut was performed.  The femoral head was removed.  They had extensive degenerative changes bipolarly.  The acetabulum was exposed.   We reamed until we had interference fit and placed a Gription shell in appropriate anteversion and inclination.  Two screws were placed to the cup in the pelvis.  Neutral trial liner was placed.  We turned our attention back to the femur.  The femur was sequentially reamed and broached until we had proximal fit and fill. We then trialed with multiple neck lengths and offsets.  They were stable in position of sleep, flexion, internalrotation, did not impinge in external rotation.  We obtained an intraoperative x-ray.  I was happy with theposition of the components and the stability of the hip.  All trial components were removed.  The wound was thoroughly irrigated.  The real polyethylene liner was  placed.  The stem was seated to the level of broach and the head was joined with the trunion. The hip was relocated.  The short external rotators and capsule were reapproximated to the trochanter through drill holes  using #5 Ethibond.  The surrounding soft tissues were injected with ROBERTO solution the fascia was closed with interrupted 0 Vicryl.  The subcu was closed with interrupted 2-0 Vicryl, and the skin was closed running 3-0 Biosyn followed by Dermabond and Steri-Strips.  Dry  sterile dressing was placed.  The patient was transferred back to the hospital bed without evidence of complication.  They will be weightbearing as tolerated.  They will be on ASA and SCDs for DVT  prophylaxis.     Additional Details: WBAT, ASA    Attending Attestation: I was present and scrubbed for the key portions of the procedure.

## 2024-12-16 NOTE — NURSING NOTE
Patient is awake, drowsy.  Answers questions appropriately.  Follows simple commands.  Denies pain and/or N/V.

## 2024-12-16 NOTE — DISCHARGE SUMMARY
MD Aleshia Bautista, KAPILS, PANailaC, ATC  Adult Reconstruction and Joint Replacement Surgery  Phone: [phone]     Fax:[phone]             Discharge Summary    Discharge Diagnosis  Right Total Hip Arthroplasty    Issues Requiring Follow-Up  Home care services to start within 48 hours. Outpatient PT to start 2 weeks  S/P total Joint for Knees only. Hips optional.    Test Results Pending At Discharge  Pending Labs       No current pending labs.          Hospital Course  Patient underwent Left Total Hip Arthroplasty on 12/16/24 without complications. The patient was then taken to the PACU in stable condition. Patient was then transferred to the or.  Pain was appropriately controlled. Diet was advanced as tolerated. Patient progressed adequately through their recovery during hospital stay including PT/ OT and were recommended for discharge. Patient was then discharged on  to home in stable condition. Patient had uneventful hospital course. Patient was instructed on the use of pain medications as needed for pain. The signs and symptoms of infection were discussed and the patient was given our number to call should they have any questions or concerns following discharge.    Based on my clinical judgment, the patient was provided with a 7-day prescription for opioid medication at 30 MED, indicated for treatment of acute pain in the setting of recent Total Joint Arthroplasty. OARRS report was run and has demonstrated an appropriate time course.  The patient has been provided with counseling pertaining to safe use of opioid medication.    Patient may use operative extremity WBAT with use of walker for assistance with ambulation .  Mepilex dressing to be removed POD # 7 by home care and incision left open to air  OAC for DVT prophylaxis started on POD #1 and to be taken for 30 days    Patient is to follow-up in 6 weeks at scheduled post-op visit.     Face-to Face after surgery progress  note  Pertinent Physical Exam At Time of Discharge  Review of Systems   Constitutional: Negative.  Negative for activity change, chills, fatigue and fever.   HENT: Negative.     Eyes: Negative.    Respiratory: Negative.  Negative for cough, chest tightness, shortness of breath and wheezing.    Cardiovascular: Negative.  Negative for palpitations.   Gastrointestinal:  Negative for abdominal pain, blood in stool, nausea and vomiting.   Endocrine: Negative.  Negative for cold intolerance and polyuria.   Genitourinary: Negative.  Negative for difficulty urinating, dysuria, frequency, hematuria and urgency.   Musculoskeletal:  Positive for gait problem and joint swelling. Negative for arthralgias and back pain.   Skin: Negative.  Negative for color change, pallor, rash and wound.   Allergic/Immunologic: Negative.  Negative for environmental allergies.   Neurological:  Negative for dizziness, weakness and light-headedness.   Hematological: Negative.    Psychiatric/Behavioral:  Negative for agitation, confusion and suicidal ideas. The patient is not nervous/anxious.    All other systems reviewed and are negative    Physical Exam  side: right  Vitals and nursing note reviewed. VSS, Afebrile  Constitutional:       Appearance: Normal appearance, awake and alert.  HENT:      Head: Normocephalic and atraumatic.       Pupils: Pupils are equal, round, and reactive to light.   Cardiovascular:      Rate and Rhythm: Normal rate and regular rhythm.   Pulmonary:      Effort: Pulmonary effort is normal.     Abdominal:         Palpations: Abdomen is soft.   Musculoskeletal:   Sensation intact bilaterally, sural/saph/sp/tibal n.  Motor intact flexion/extension/DF/PF/EHL/FHL bilaterally. Palpable symmetric DP/PT pulse bilaterally. Spinal wearing off.    Skin:      Bulky Dressing intact to the surgical extremity. No signs of gross bloody or purulent drainage.     General: Skin is warm and dry.      Capillary Refill: Capillary refill takes  less than 2 seconds.   Neurological:      General: No focal deficit present.      Mental Status: She is alert and oriented to person, place, and time. Mental status is at baseline.   Psychiatric:         Mood and Affect: Mood normal.        Home Medications  Scheduled medications    Current Facility-Administered Medications:     ceFAZolin (Ancef) 2 g in dextrose (iso)  mL, 2 g, intravenous, Once, Aleshia Medina PA-C    scopolamine (Transderm-Scop) patch 1 patch, 1 patch, transdermal, q72h, Aleshia Medina PA-C, 1 patch at 12/16/24 0713    Facility-Administered Medications Ordered in Other Encounters:     lactated Ringer's infusion, , intravenous, Continuous PRN, Isauro Clay, FRANK, New Bag at 12/16/24 0736     PRN medications      Discharge medications     Your medication list        START taking these medications        Instructions Last Dose Given Next Dose Due   aspirin 81 mg EC tablet      Take 1 tablet (81 mg) by mouth 2 times a day.       docusate sodium 100 mg capsule  Commonly known as: Colace      Take 1 capsule (100 mg) by mouth 2 times a day.       meloxicam 15 mg tablet  Commonly known as: Mobic      Take 1 tablet (15 mg) by mouth once daily.       oxyCODONE 5 mg immediate release tablet  Commonly known as: Roxicodone      Take 1 tablet (5 mg) by mouth every 6 hours if needed for severe pain (7 - 10) for up to 7 days.       polyethylene glycol 17 gram packet  Commonly known as: Glycolax, Miralax      Take 17 g by mouth once daily. Mix 1 cap (17g) into 8 ounces of fluid.       traMADol 50 mg tablet  Commonly known as: Ultram      Take 1 tablet (50 mg) by mouth every 6 hours if needed for severe pain (7 - 10) for up to 7 days.              CONTINUE taking these medications        Instructions Last Dose Given Next Dose Due   acetaminophen 500 mg tablet  Commonly known as: Tylenol Extra Strength      Take 2 tablets (1,000 mg) by mouth every 6 hours if needed for mild pain (1 - 3).        amLODIPine 5 mg tablet  Commonly known as: Norvasc      Take 1 tablet (5 mg) by mouth once daily.       cetirizine 10 mg chewable tablet  Commonly known as: ZyrTEC           cholecalciferol 125 mcg (5000 UT) capsule  Commonly known as: Vitamin D-3           EpiCeram lotion  Generic drug: emollient combination no.32           magnesium oxide 400 mg tablet  Commonly known as: Mag-Ox           omeprazole 20 mg DR capsule  Commonly known as: PriLOSEC      Take 1 capsule (20 mg) by mouth every other day.       penicillin v potassium 500 mg tablet  Commonly known as: Veetid           tretinoin 0.05 % cream  Commonly known as: Retin-A           Vitamin C 1,000 mg tablet  Generic drug: ascorbic acid                  STOP taking these medications      chlorhexidine 0.12 % solution  Commonly known as: Peridex        ibuprofen 200 mg tablet                  Where to Get Your Medications        These medications were sent to [facility]  [address], [address], [facility] [address]      Hours: 8 AM to 6 PM Mon-Fri, 9 AM to 1 PM Saturday Phone: [phone]   acetaminophen 500 mg tablet  aspirin 81 mg EC tablet  docusate sodium 100 mg capsule  meloxicam 15 mg tablet  oxyCODONE 5 mg immediate release tablet  polyethylene glycol 17 gram packet  traMADol 50 mg tablet              Outpatient Follow-Up  Patient to follow-up with /Aleshia Medina PA-C.  Thank you for trusting us with your care. You should be scheduled for a follow-up post-surgical visit in 6 weeks.    Special Instructions  None    Please read discharge instructions provided by your surgeon before calling with questions as this will delay care.    Medication refills-Oxycodone and Tramadol will be refilled every 7 days per state law. Request refills through [facility] at the institution in which you had surgery or MyChart. All medication requests may take up to 72 hours to refill and refills after Friday 1pm will be refilled on the next  business day.

## 2024-12-16 NOTE — PROGRESS NOTES
59 yr old female admitted RR following right hip replacement with Dr. Hernandez.  Plan is home tomorrow with [facility] PT/OT. SOC confirmed on 12/17/24  Pt lives alone and will be going to stay with her sister Melinda at discharge. Address confirmed at [address] [place] [address]. [place] [id number].  There are 3 steps to enter her sisters home and 1 step inside.  Post op follow up confirmed on 1/30/25 at 10 am-[facility]   12/16/24 3259   Discharge Planning   Living Arrangements Alone   Support Systems Family members   Assistance Needed transportation   Type of Residence Private residence   Number of Stairs to Enter Residence 3   Number of Stairs Within Residence 13   Do you have animals or pets at home? Yes   Type of Animals or Pets 2 dogs   Who is requesting discharge planning? Provider   Home or Post Acute Services In home services   Type of Home Care Services Home PT;Home OT   Expected Discharge Disposition Home H  ( Home Care)

## 2024-12-16 NOTE — NURSING NOTE
Patient upstairs to room 208 for RR. Team notified of patient arrival.      Bedside handoff report called by PACU RN Kristen DICKSON Patient transported via cart to room.      Plan of day discussed with patient and family; all questions answered at this time. Patient and family verbalized understanding that patient is not to ambulate without RN at bedside. Bed locked and lowered, call light in reach. Patient safety maintained.     Orders reviewed and released.

## 2024-12-16 NOTE — ANESTHESIA PREPROCEDURE EVALUATION
Patient: Tiki Gunter    Procedure Information       Date/Time: 12/16/24 0805    Procedure: Hip Replacement Total Uncement Unilat *Rapid Recovery* (Right: Hip) - DePuy [place] Cup, DePuy [place] Stem    Location: [facility] / [facility]    Surgeons: Nate Hernandez MD            Relevant Problems   Anesthesia (within normal limits)      Cardiac   (+) Aortic aneurysm (CMS-HCC)   (+) Essential (primary) hypertension   (+) Pure hypercholesterolemia      Pulmonary (within normal limits)      Neuro (within normal limits)      GI   (+) Gastro-esophageal reflux disease without esophagitis      /Renal (within normal limits)      Liver (within normal limits)      Endocrine   (+) Obesity      Hematology (within normal limits)      Musculoskeletal   (+) Primary osteoarthritis of right hip   (+) Primary osteoarthritis, left shoulder   (+) Unilateral primary osteoarthritis, right hip      HEENT (within normal limits)      ID (within normal limits)      Skin (within normal limits)      GYN (within normal limits)       Clinical information reviewed:   Tobacco  Allergies  Meds   Med Hx  Surg Hx   Fam Hx  Soc Hx      Allergies   Allergen Reactions    Zetia [Ezetimibe] Dizziness     Jaw pain    Rosuvastatin Rash and Myalgia    Statins-Hmg-Coa Reductase Inhibitors Rash and Myalgia     Prior to Admission medications    Medication Sig Start Date End Date Taking? Authorizing Provider   acetaminophen (Tylenol) 500 mg tablet Take 2 tablets (1,000 mg) by mouth every 6 hours if needed for mild pain (1 - 3).   Yes Historical Provider, MD   amLODIPine (Norvasc) 5 mg tablet Take 1 tablet (5 mg) by mouth once daily. 9/4/24  Yes El Tang MD   ascorbic acid (Vitamin C) 1,000 mg tablet Take 1 tablet (1,000 mg) by mouth once daily.   Yes Historical Provider, MD   cetirizine (ZyrTEC) 10 mg chewable tablet Chew 1 tablet (10 mg) if needed for allergies.   Yes Historical Provider, MD   chlorhexidine (Peridex) 0.12 % solution  Use 15 mL in the mouth or throat once daily for 2 doses. 15 ML night before surgery and 15 ML morning of surgery. Swish and spit 11/25/24 12/26/24 Yes CHETAN Alexander-CNP   cholecalciferol (Vitamin D-3) 125 MCG (5000 UT) capsule Take 1 capsule (125 mcg) by mouth once daily. 3/25/19  Yes Historical Provider, MD   ibuprofen 200 mg tablet Take 3 tablets (600 mg) by mouth every 6 hours.   Yes Historical Provider, MD   magnesium oxide (Mag-Ox) 400 mg tablet Take 1 tablet (400 mg) by mouth once daily.   Yes Historical Provider, MD   omeprazole (PriLOSEC) 20 mg DR capsule Take 1 capsule (20 mg) by mouth every other day. 12/2/24 12/2/25 Yes El Tang MD   tretinoin (Retin-A) 0.05 % cream APPLY TO AFFECTED AREAS AT NIGHTTIME 9/18/24  Yes Historical Provider, MD   acetaminophen (Tylenol Extra Strength) 500 mg tablet Take 2 tablets (1,000 mg) by mouth every 6 hours if needed for mild pain (1 - 3). 12/15/24 1/14/25  Xiang Christian MD   aspirin 81 mg EC tablet Take 1 tablet (81 mg) by mouth 2 times a day. 12/15/24 1/14/25  Xiang Christian MD   docusate sodium (Colace) 100 mg capsule Take 1 capsule (100 mg) by mouth 2 times a day. 12/15/24 1/14/25  Xiang Christian MD   EpiCeram lotion Apply topically. 2-3 times per day  Patient not taking: Reported on 12/16/2024 8/15/23   Historical Provider, MD   meloxicam (Mobic) 15 mg tablet Take 1 tablet (15 mg) by mouth once daily. 12/15/24 1/14/25  Xiang Christian MD   oxyCODONE (Roxicodone) 5 mg immediate release tablet Take 1 tablet (5 mg) by mouth every 6 hours if needed for severe pain (7 - 10) for up to 7 days. 12/15/24 12/22/24  Xiang Christian MD   penicillin v potassium (Veetid) 500 mg tablet TAKE 4 TABLETS BY MOUTH 1 HOUR PRIOR TO PROCEDURE, AND 2 TABLETS EVERY 6 HOURS POST PROCEDURE 3/23/23   Historical Provider, MD   polyethylene glycol (Glycolax, Miralax) 17 gram packet Take 17 g by mouth once daily. Mix 1 cap (17g) into 8 ounces of fluid. 12/15/24 1/14/25  Xiang EISENBERG  MD Anahi   traMADol (Ultram) 50 mg tablet Take 1 tablet (50 mg) by mouth every 6 hours if needed for severe pain (7 - 10) for up to 7 days. 12/15/24 12/22/24  Xiang Christian MD     Past Medical History:   Diagnosis Date    Arthritis     Ascending aorta dilatation (CMS-HCC)     Delayed emergence from general anesthesia     Eczema     GERD (gastroesophageal reflux disease)     Hyperlipidemia     Hypertension      Past Surgical History:   Procedure Laterality Date    BUNIONECTOMY Bilateral     COLONOSCOPY  2019    COSMETIC SURGERY      ENDOMETRIAL ABLATION      HIP ARTHROPLASTY Left 2021    JOINT REPLACEMENT  3/31/2021 4/19/2024    OH BREAST AUGMENTATION WITH IMPLANT  2006    TONSILLECTOMY      TOTAL SHOULDER ARTHROPLASTY Left 04/19/2024    WISDOM TOOTH EXTRACTION       Vitals:    12/16/24 0637   BP: (!) 157/97   Pulse: 87   Resp: 16   Temp: 36.6 °C (97.9 °F)   SpO2: 98%       Past Surgical History:   Procedure Laterality Date    BUNIONECTOMY Bilateral     COLONOSCOPY  2019    COSMETIC SURGERY      ENDOMETRIAL ABLATION      HIP ARTHROPLASTY Left 2021    JOINT REPLACEMENT  3/31/2021 4/19/2024    OH BREAST AUGMENTATION WITH IMPLANT  2006    TONSILLECTOMY      TOTAL SHOULDER ARTHROPLASTY Left 04/19/2024    WISDOM TOOTH EXTRACTION       Past Medical History:   Diagnosis Date    Arthritis     Ascending aorta dilatation (CMS-HCC)     Delayed emergence from general anesthesia     Eczema     GERD (gastroesophageal reflux disease)     Hyperlipidemia     Hypertension        Current Facility-Administered Medications:     ceFAZolin (Ancef) 2 g in dextrose (iso)  mL, 2 g, intravenous, Once, Aleshia Medina PA-C    scopolamine (Transderm-Scop) patch 1 patch, 1 patch, transdermal, q72h, Aleshia Medina PA-C, 1 patch at 12/16/24 0713  Prior to Admission medications    Medication Sig Start Date End Date Taking? Authorizing Provider   acetaminophen (Tylenol) 500 mg tablet Take 2 tablets (1,000 mg) by mouth every 6 hours if  needed for mild pain (1 - 3).   Yes Historical Provider, MD   amLODIPine (Norvasc) 5 mg tablet Take 1 tablet (5 mg) by mouth once daily. 9/4/24  Yes El Tang MD   ascorbic acid (Vitamin C) 1,000 mg tablet Take 1 tablet (1,000 mg) by mouth once daily.   Yes Historical Provider, MD   cetirizine (ZyrTEC) 10 mg chewable tablet Chew 1 tablet (10 mg) if needed for allergies.   Yes Historical Provider, MD   chlorhexidine (Peridex) 0.12 % solution Use 15 mL in the mouth or throat once daily for 2 doses. 15 ML night before surgery and 15 ML morning of surgery. Swish and spit 11/25/24 12/26/24 Yes Terry Nava APRN-CNP   cholecalciferol (Vitamin D-3) 125 MCG (5000 UT) capsule Take 1 capsule (125 mcg) by mouth once daily. 3/25/19  Yes Historical Provider, MD   ibuprofen 200 mg tablet Take 3 tablets (600 mg) by mouth every 6 hours.   Yes Historical Provider, MD   magnesium oxide (Mag-Ox) 400 mg tablet Take 1 tablet (400 mg) by mouth once daily.   Yes Historical Provider, MD   omeprazole (PriLOSEC) 20 mg DR capsule Take 1 capsule (20 mg) by mouth every other day. 12/2/24 12/2/25 Yes El Tang MD   tretinoin (Retin-A) 0.05 % cream APPLY TO AFFECTED AREAS AT NIGHTTIME 9/18/24  Yes Historical Provider, MD   acetaminophen (Tylenol Extra Strength) 500 mg tablet Take 2 tablets (1,000 mg) by mouth every 6 hours if needed for mild pain (1 - 3). 12/15/24 1/14/25  Xiang Christian MD   aspirin 81 mg EC tablet Take 1 tablet (81 mg) by mouth 2 times a day. 12/15/24 1/14/25  Xiang Christian MD   docusate sodium (Colace) 100 mg capsule Take 1 capsule (100 mg) by mouth 2 times a day. 12/15/24 1/14/25  Xiang Christian MD   EpiCeram lotion Apply topically. 2-3 times per day  Patient not taking: Reported on 12/16/2024 8/15/23   Historical Provider, MD   meloxicam (Mobic) 15 mg tablet Take 1 tablet (15 mg) by mouth once daily. 12/15/24 1/14/25  Xiang Christian MD   oxyCODONE (Roxicodone) 5 mg immediate release tablet Take 1 tablet  "(5 mg) by mouth every 6 hours if needed for severe pain (7 - 10) for up to 7 days. 12/15/24 12/22/24  Xiang Christian MD   penicillin v potassium (Veetid) 500 mg tablet TAKE 4 TABLETS BY MOUTH 1 HOUR PRIOR TO PROCEDURE, AND 2 TABLETS EVERY 6 HOURS POST PROCEDURE 3/23/23   Historical Provider, MD   polyethylene glycol (Glycolax, Miralax) 17 gram packet Take 17 g by mouth once daily. Mix 1 cap (17g) into 8 ounces of fluid. 12/15/24 1/14/25  Xiang Christian MD   traMADol (Ultram) 50 mg tablet Take 1 tablet (50 mg) by mouth every 6 hours if needed for severe pain (7 - 10) for up to 7 days. 12/15/24 12/22/24  Xiang Christian MD     Allergies   Allergen Reactions    Zetia [Ezetimibe] Dizziness     Jaw pain    Rosuvastatin Rash and Myalgia    Statins-Hmg-Coa Reductase Inhibitors Rash and Myalgia     Social History     Tobacco Use    Smoking status: Former     Current packs/day: 0.00     Average packs/day: 0.3 packs/day for 1 year (0.3 ttl pk-yrs)     Types: Cigarettes     Start date:      Quit date:      Years since quittin.9     Passive exposure: Never    Smokeless tobacco: Never    Tobacco comments:     I quit smoking in    Substance Use Topics    Alcohol use: Yes     Alcohol/week: 2.0 standard drinks of alcohol         Chemistry    Lab Results   Component Value Date/Time     2024 1145    K 3.5 2024 1145     2024 1145    CO2 27 2024 1145    BUN 17 2024 1145    CREATININE 0.68 2024 1145    Lab Results   Component Value Date/Time    CALCIUM 10.0 2024 1145    ALKPHOS 80 2024 1145    AST 25 2024 1145    ALT 13 2024 1145    BILITOT 0.5 2024 1145          Lab Results   Component Value Date/Time    WBC 4.6 2024 1145    HGB 11.3 (L) 2024 1145    HCT 36.4 2024 1145     2024 1145     No results found for: \"PROTIME\", \"PTT\", \"INR\"  Encounter Date: 24   ECG 12 Lead   Result Value    Ventricular Rate 70 "    Atrial Rate 70    CO Interval 158    QRS Duration 94    QT Interval 388    QTC Calculation(Bazett) 419    P Axis 23    R Axis -6    T Axis -6    QRS Count 11    Q Onset 220    P Onset 141    P Offset 189    T Offset 414    QTC Fredericia 408    Narrative     Normal sinus rhythm   Non diagnostic inferior Q waves   Cannot rule outÂ  Inferior infarctÂ , age undetermined   Poor R-wave progressionÂ   Abnormal ECG  When compared with ECG ofÂ 16-APR-2024 13:09,  No significant change was found  Â   Confirmed by Kashif Jacobs ([id number]) on 11/25/2024 9:56:12 PM       NPO Detail:  NPO/Void Status  Date of Last Liquid: 12/16/24  Time of Last Liquid: 0200 (water with meds)  Date of Last Solid: 12/15/24  Time of Last Solid: 2000  Last Intake Type: Light meal  Time of Last Void: 0651         Physical Exam    Airway  Mallampati: I  TM distance: >3 FB  Neck ROM: full     Cardiovascular    Dental - normal exam     Pulmonary    Abdominal            Anesthesia Plan    History of general anesthesia?: yes  History of complications of general anesthesia?: no    ASA 3     spinal     The patient is not a current smoker.  Patient was not previously instructed to abstain from smoking on day of procedure.  Patient did not smoke on day of procedure.  Education provided regarding risk of obstructive sleep apnea.  intravenous induction   Anesthetic plan and risks discussed with patient.    Plan discussed with CRNA and CAA.

## 2024-12-16 NOTE — ANESTHESIA POSTPROCEDURE EVALUATION
Patient: Tiki Gunter    Procedure Summary       Date: 12/16/24 Room / Location: [facility] / [facility]    Anesthesia Start: 0856 Anesthesia Stop: 1103    Procedure: Hip Replacement Total Uncement Unilat *Rapid Recovery* (Right: Hip) Diagnosis:       Unilateral primary osteoarthritis, right hip      (Unilateral primary osteoarthritis, right hip [M16.11])    Surgeons: Nate Hernandez MD Responsible Provider: El Yadav MD    Anesthesia Type: spinal ASA Status: 3            Anesthesia Type: spinal    Vitals Value Taken Time   /85 12/16/24 1148   Temp 36.2 °C (97.2 °F) 12/16/24 1134   Pulse 74 12/16/24 1148   Resp 14 12/16/24 1148   SpO2 95 % 12/16/24 1148       Anesthesia Post Evaluation    Patient location during evaluation: PACU  Patient participation: complete - patient participated  Level of consciousness: awake  Pain score: 0  Pain management: adequate  Multimodal analgesia pain management approach  Airway patency: patent  Two or more strategies used to mitigate risk of obstructive sleep apnea  Cardiovascular status: acceptable  Respiratory status: acceptable  Hydration status: acceptable  Postoperative Nausea and Vomiting: none        There were no known notable events for this encounter.

## 2024-12-16 NOTE — HH CARE COORDINATION
Home Care received a Referral for Physical Therapy and Occupational Therapy. We have processed the referral for a Start of Care on 12.17.     If you have any questions or concerns, please feel free to contact us at [phone]. Follow the prompts, enter your five digit zip code, and you will be directed to your care team on [facility] 1.

## 2024-12-16 NOTE — PROGRESS NOTES
Physical Therapy Evaluation & Treatment    Patient Name: Tiki Gunter  MRN: [id number]  Department: [facility] PACU  Room: [facility] OR  Today's Date: 12/16/2024   Time Calculation  Start Time: 1407  Stop Time: 1432  Time Calculation (min): 25 min  12:25-12:40 PM for first session  [id number] for Second session  Assessment/Plan   PT Assessment  PT Assessment Results: Decreased strength, Decreased range of motion, Decreased mobility, Impaired balance, Decreased coordination, Orthopedic restrictions, Pain  Rehab Prognosis: Good  Barriers to Discharge Home: Physical needs  Physical Needs: Stair navigation into home limited by function/safety, Intermittent mobility assistance needed  Evaluation/Treatment Tolerance: Patient tolerated treatment well, Patient limited by pain  Medical Staff Made Aware: No  End of Session Communication: Bedside nurse  Assessment Comment: PT eval low. Pt presenting to eval wtih deficits in functional mobility, impaired ROM/strength, and increased pain in R hip. pt understanding of HEP and completed ther ex without assist. pt understanding of hip precautions as well. PT in twice at 1225 and at [id number] for stairs navigation, ambulation, tranfsers. Pt completed ADLs with assist. PT recommending [facility] PT with assist as needed  End of Session Patient Position: Up in chair (ice on R hip, call bell in reach, RN notified)   IP OR SWING BED PT PLAN  Inpatient or Swing Bed: Inpatient  PT Plan  Treatment/Interventions: Bed mobility, Transfer training, Gait training, Stair training, Strengthening, Endurance training, Range of motion, Therapeutic activity, Home exercise program, Positioning  PT Plan: Ongoing PT  PT Frequency: BID  PT Discharge Recommendations: Low intensity level of continued care  Equipment Recommended upon Discharge: Straight cane, Wheeled walker  PT Recommended Transfer Status: Assistive device, Stand by assist  PT - OK to Discharge: Yes      Subjective     General Visit  Information:  General  Reason for Referral: Pt presenting to [facility] on 12/16/24 for R posterior THR by Dr. Hernandez.  Past Medical History Relevant to Rehab: obesity, htn, gerd, hld, ascending aortic dilation, L hip, TSR, breast augmentation,  Family/Caregiver Present: Yes  Prior to Session Communication: Bedside nurse  Patient Position Received: Bed, 2 rail up  General Comment: cleared by RN and agreeable to session  Home Living:  Home Living  Type of Home: House  Lives With: Alone  Home Adaptive Equipment: Walker rolling or standard, Cane  Home Layout: One level  Home Access: Stairs to enter with rails  Entrance Stairs-Number of Steps: 3 steps in with railing  Home Living Comments: styaing at sisters home which is listed above. pt lives in [place] with 3 steps + rail to enter and 12 steps to bedroom with railing.  Prior Level of Function:  Prior Function Per Pt/Caregiver Report  Level of [place]: Independent with ADLs and functional transfers  ADL Assistance: Independent  Homemaking Assistance: Independent  Ambulatory Assistance: Independent  Vocational: Full time employment  Prior Function Comments: no falls within last 6 months  Precautions:  Precautions  LE Weight Bearing Status: Weight Bearing as Tolerated  Medical Precautions: Fall precautions  Post-Surgical Precautions: Right hip precautions    Vital Signs (Past 2hrs)                Objective   Pain:  Pain Assessment  Pain Assessment: 0-10  0-10 (Numeric) Pain Score: 5 - Moderate pain  Pain Type: Surgical pain  Pain Location: Hip  Pain Orientation: Right  Pain Descriptors: Aching  Pain Frequency: Intermittent  Pain Interventions: Cold applied, Rest, Repositioned, Distraction  Response to Interventions: No change in pain, Other (Comment) (requesting pain meds - RN notified)  Cognition:  Cognition  Overall Cognitive Status: Within Functional Limits  Attention: Within Functional Limits  Memory: Within Funtional Limits  Problem Solving: Within Functional  Limits  Numeric Reasoning: Within Functional Limits  Abstract Reasoning: Within Functional Limits  Safety/Judgement: Within Functional Limits  Insight: Within function limits  Impulsive: Within functional limits  Processing Speed: Within funtional limits    General Assessments:  General Observation  General Observation: dressing dry and intact on R hip               Activity Tolerance  Endurance: Endurance does not limit participation in activity    Sensation  Light Touch: No apparent deficits            Perception  Inattention/Neglect: Appears intact  Initiation: Appears intact  Motor Planning: Appears intact  Perseveration: Not present      Coordination  Movements are Fluid and Coordinated: No  Lower Body Coordination: impaired from post op limitations    Postural Control  Postural Control: Within Functional Limits    Static Sitting Balance  Static Sitting-Balance Support: Feet supported  Static Sitting-Level of Assistance: Independent  Dynamic Sitting Balance  Dynamic Sitting-Level of Assistance: Close supervision    Static Standing Balance  Static Standing-Balance Support: Bilateral upper extremity supported  Static Standing-Level of Assistance: Close supervision  Static Standing-Comment/Number of Minutes: with RW  Dynamic Standing Balance  Dynamic Standing-Balance Support: Bilateral upper extremity supported  Dynamic Standing-Level of Assistance: Close supervision  Dynamic Standing-Comments: with RW  Functional Assessments:  Bed Mobility  Bed Mobility: Yes  Bed Mobility 1  Bed Mobility 1: Supine to sitting  Level of Assistance 1: Close supervision    Transfers  Transfer: Yes  Transfer 1  Technique 1: Sit to stand, Stand to sit  Transfer Device 1: Walker  Transfer Level of Assistance 1: Close supervision  Trials/Comments 1: x3 STS without buckle or LOB; CSx1 for activity - cued for hand placement    Ambulation/Gait Training  Ambulation/Gait Training Performed: Yes  Ambulation/Gait Training 1  Surface 1: Level  tile  Device 1: Rolling walker  Assistance 1: Close supervision, Contact guard  Quality of Gait 1: Decreased step length, Forward flexed posture, Antalgic  Comments/Distance (ft) 1: first ambulation 12 feetx2 with Rw at CGx1 to CSx1 using reciprocal stepping, no buckle or LOB. slower pete but doing well; stable  Ambulation/Gait Training 2  Surface 2: Level tile  Device 2: Rolling walker  Assistance 2: Distant supervision  Quality of Gait 2: Forward flexed posture, Antalgic  Comments/Distance (ft) 2: ambulated 150 feetx2 using reciprocal stepping with walker; no buckle or lOB but increased flexed posture due to pain    Stairs  Stairs: Yes  Stairs  Rails 1: Left, Right  Curb Step 1: No  Device 1: Railing, Single point cane  Assistance 1: Close supervision  Comment/Number of Steps 1: 6 steps with railing/ cane - step to pattern, cued for cane progression. no buckle or lOB.  Extremity/Trunk Assessments:  RUE   RUE : Within Functional Limits  LUE   LUE: Within Functional Limits  RLE   RLE : Exceptions to WFL  Strength RLE  RLE Overall Strength: Greater than or equal to 3/5 as evidenced by functional mobility, Within Functional Limits - able to perform ADL tasks with strength, Deficits, Due to pain  LLE   LLE : Within Functional Limits  Treatments:  Therapeutic Exercise  Therapeutic Exercise Performed: Yes (x10)  Therapeutic Exercise Activity 1: ankle pump  Therapeutic Exercise Activity 2: quad set  Therapeutic Exercise Activity 3: glute set  Therapeutic Exercise Activity 4: heel slid  Therapeutic Exercise Activity 5: hip abduction  Therapeutic Exercise Activity 6: SAQ    Therapeutic Activity  Therapeutic Activity Performed: Yes  Therapeutic Activity 1: pt toileted without assist for hand hygiene or cleansing hygiene.  Therapeutic Activity 2: pt dressed BUE without assist and BLE with assist to maintain hip precautions. pt had no buckle or lOB while adjusting clothing in standing without UE support.  Outcome  Measures:  [facility] Basic Mobility  Turning from your back to your side while in a flat bed without using bedrails: A little  Moving from lying on your back to sitting on the side of a flat bed without using bedrails: A little  Moving to and from bed to chair (including a wheelchair): None  Standing up from a chair using your arms (e.g. wheelchair or bedside chair): None  To walk in hospital room: None  Climbing 3-5 steps with railing: None  Basic Mobility - Total Score: 22        Education Documentation  Handouts, taught by Alisia Crook PT at 12/16/2024  3:02 PM.  Learner: Family, Patient  Readiness: Acceptance  Method: Explanation, Demonstration, Handout  Response: Verbalizes Understanding, Demonstrated Understanding    Precautions, taught by Alisia Crook PT at 12/16/2024  3:02 PM.  Learner: Family, Patient  Readiness: Acceptance  Method: Explanation, Demonstration, Handout  Response: Verbalizes Understanding, Demonstrated Understanding    Body Mechanics, taught by Alisia Crook PT at 12/16/2024  3:02 PM.  Learner: Family, Patient  Readiness: Acceptance  Method: Explanation, Demonstration, Handout  Response: Verbalizes Understanding, Demonstrated Understanding    Home Exercise Program, taught by Alisia Crook PT at 12/16/2024  3:02 PM.  Learner: Family, Patient  Readiness: Acceptance  Method: Explanation, Demonstration, Handout  Response: Verbalizes Understanding, Demonstrated Understanding    Mobility Training, taught by Alisia Crook PT at 12/16/2024  3:02 PM.  Learner: Family, Patient  Readiness: Acceptance  Method: Explanation, Demonstration, Handout  Response: Verbalizes Understanding, Demonstrated Understanding    Education Comments  No comments found.    Alisia Crook, PT, DPT

## 2024-12-16 NOTE — PROGRESS NOTES
Medication Education     Medication education for Tiki Gunter was provided to the patient and family for the following medication(s):  Aspirin  Oxycodone  Tramadol  Tylenol  Mobic  Docusate  Miralax      Medication education provided by a Pharmacist:  Dose, frequency, storage Proper dose, indication, possible ADRs Refilling the medication  How the medication works and benefits of taking it Benefits of taking the medication  Importance of compliance    Identified potential barriers to education:  None    Method(s) of Education:  Verbal Written materials provided and reviewed    An opportunity to ask questions and receive answers was provided.     Assessment of understanding the patient and family:  2= meets goals/outcomes    Additional Notes (if applicable):     M2B delivered.    Eren Alvarez, PharmD

## 2024-12-17 ENCOUNTER — HOME CARE VISIT (OUTPATIENT)
Dept: HOME HEALTH SERVICES | Facility: HOME HEALTH | Age: 59
End: 2024-12-17
Payer: COMMERCIAL

## 2024-12-17 VITALS
DIASTOLIC BLOOD PRESSURE: 64 MMHG | RESPIRATION RATE: 16 BRPM | WEIGHT: 159 LBS | SYSTOLIC BLOOD PRESSURE: 103 MMHG | HEART RATE: 61 BPM | BODY MASS INDEX: 27.14 KG/M2 | HEIGHT: 64 IN | OXYGEN SATURATION: 98 % | TEMPERATURE: 96.8 F

## 2024-12-17 PROCEDURE — G0151 HHCP-SERV OF PT,EA 15 MIN: HCPCS

## 2024-12-17 SDOH — HEALTH STABILITY: PHYSICAL HEALTH: EXERCISE TYPE: POSTERIOR HIP HEP

## 2024-12-17 SDOH — HEALTH STABILITY: PHYSICAL HEALTH
EXERCISE COMMENTS: SUPINE  ISOMETRIC QUADS  ISOMETRIC GLUTS  ANKLE PUMPS  SAQ  SLR  HIP ABDUCTION  HEEL SLIDES    SITTING  ANKLE PUMPS  LAQ  MARCHING  ISOM HIP ADDUCTION  ACTIVE ABDUCTION

## 2024-12-17 ASSESSMENT — ENCOUNTER SYMPTOMS
MUSCLE WEAKNESS: 1
PAIN LOCATION - RELIEVING FACTORS: ICE AND MEDS
LOWEST PAIN SEVERITY IN PAST 24 HOURS: 4/10
PAIN LOCATION - PAIN FREQUENCY: CONSTANT
PAIN: 1
PAIN SEVERITY GOAL: 3/10
LIMITED RANGE OF MOTION: 1
PAIN LOCATION - PAIN QUALITY: DULL AND ACHY
PAIN LOCATION: RIGHT HIP
HYPERTENSION: 1
PAIN LOCATION - EXACERBATING FACTORS: WALKING AND EXERCISES
HIGHEST PAIN SEVERITY IN PAST 24 HOURS: 8/10
PERSON REPORTING PAIN: PATIENT
PAIN LOCATION - PAIN SEVERITY: 3/10
SUBJECTIVE PAIN PROGRESSION: WAXING AND WANING

## 2024-12-17 ASSESSMENT — ACTIVITIES OF DAILY LIVING (ADL)
TOILETING: 1
DRESSING_UB_CURRENT_FUNCTION: STAND BY ASSIST
DRESSING_LB_CURRENT_FUNCTION: STAND BY ASSIST
TOILETING: SUPERVISION
AMBULATION_DISTANCE/DURATION_TOLERATED: 50 X 2
AMBULATION ASSISTANCE: ONE PERSON
AMBULATION ASSISTANCE: STAND BY ASSIST
AMBULATION ASSISTANCE: 1
GROOMING ASSESSED: 1
TOILETING: STAND BY ASSIST
ENTERING_EXITING_HOME: MINIMUM ASSIST
AMBULATION ASSISTANCE ON FLAT SURFACES: 1
GROOMING_CURRENT_FUNCTION: STAND BY ASSIST
GROOMING_CURRENT_FUNCTION: SUPERVISION
DRESSING_UB_CURRENT_FUNCTION: SUPERVISION
OASIS_M1830: 03
DRESSING_LB_CURRENT_FUNCTION: SUPERVISION

## 2024-12-20 ENCOUNTER — HOME CARE VISIT (OUTPATIENT)
Dept: HOME HEALTH SERVICES | Facility: HOME HEALTH | Age: 59
End: 2024-12-20
Payer: COMMERCIAL

## 2024-12-20 VITALS
SYSTOLIC BLOOD PRESSURE: 111 MMHG | DIASTOLIC BLOOD PRESSURE: 69 MMHG | HEART RATE: 94 BPM | TEMPERATURE: 96.8 F | OXYGEN SATURATION: 98 % | RESPIRATION RATE: 16 BRPM

## 2024-12-20 PROCEDURE — G0151 HHCP-SERV OF PT,EA 15 MIN: HCPCS

## 2024-12-20 ASSESSMENT — ENCOUNTER SYMPTOMS
PAIN SEVERITY GOAL: 2/10
PAIN LOCATION - RELIEVING FACTORS: ICE AND MEDS
PERSON REPORTING PAIN: PATIENT
HIGHEST PAIN SEVERITY IN PAST 24 HOURS: 4/10
PAIN LOCATION - PAIN FREQUENCY: INTERMITTENT
LOWEST PAIN SEVERITY IN PAST 24 HOURS: 3/10
PAIN: 1
PAIN LOCATION - PAIN SEVERITY: 3/10
PAIN LOCATION: RIGHT HIP
PAIN LOCATION - EXACERBATING FACTORS: WALKING
PAIN LOCATION - PAIN QUALITY: DULL AND ACHY
SUBJECTIVE PAIN PROGRESSION: WAXING AND WANING

## 2024-12-20 NOTE — SIGNIFICANT EVENT
Thank you for taking my call today regarding your recent joint replacement surgery with Dr. Nate Hernandez.      We discussed that: Home Health Care services (physical and/or occupational therapy) have been initiated Your pain is Controlled on the current regimen Will fluctuate throughout recovery with increased activity You are able to tolerate regular activity and exercises The importance of continued cold therapy throughout recovery The importance of following the prescribed precautions by your surgeon You have had a bowel movement The importance of continuing blood thinner as prescribed The importance of wearing compression stockings as prescribed    You indicated that all of your questions have been answered at the time of our call.    Please don't hesitate to reach out if you have any additional questions or concerns.    Clarissa Saul MBA, BSN, RN-BC  Orthopedic Program Navigator  [facility]   [phone]

## 2024-12-23 ENCOUNTER — HOME CARE VISIT (OUTPATIENT)
Dept: HOME HEALTH SERVICES | Facility: HOME HEALTH | Age: 59
End: 2024-12-23
Payer: COMMERCIAL

## 2024-12-23 VITALS
SYSTOLIC BLOOD PRESSURE: 129 MMHG | OXYGEN SATURATION: 99 % | DIASTOLIC BLOOD PRESSURE: 77 MMHG | TEMPERATURE: 96.8 F | HEART RATE: 86 BPM

## 2024-12-23 PROCEDURE — G0151 HHCP-SERV OF PT,EA 15 MIN: HCPCS

## 2024-12-23 ASSESSMENT — ENCOUNTER SYMPTOMS
HIGHEST PAIN SEVERITY IN PAST 24 HOURS: 3/10
PAIN LOCATION - PAIN QUALITY: DULL AND ACHY
PAIN LOCATION - RELIEVING FACTORS: ICE AND MEDS
PAIN LOCATION - PAIN SEVERITY: 3/10
PAIN LOCATION - PAIN FREQUENCY: INTERMITTENT
LOWEST PAIN SEVERITY IN PAST 24 HOURS: 2/10
PAIN: 1
PAIN LOCATION - EXACERBATING FACTORS: EXERCISE
SUBJECTIVE PAIN PROGRESSION: WAXING AND WANING
PERSON REPORTING PAIN: PATIENT
PAIN LOCATION: RIGHT HIP
PAIN SEVERITY GOAL: 2/10

## 2024-12-27 ENCOUNTER — HOME CARE VISIT (OUTPATIENT)
Dept: HOME HEALTH SERVICES | Facility: HOME HEALTH | Age: 59
End: 2024-12-27
Payer: COMMERCIAL

## 2024-12-27 VITALS
RESPIRATION RATE: 16 BRPM | DIASTOLIC BLOOD PRESSURE: 73 MMHG | HEART RATE: 95 BPM | TEMPERATURE: 96.9 F | OXYGEN SATURATION: 98 % | SYSTOLIC BLOOD PRESSURE: 108 MMHG

## 2024-12-27 PROCEDURE — G0151 HHCP-SERV OF PT,EA 15 MIN: HCPCS

## 2024-12-27 ASSESSMENT — ENCOUNTER SYMPTOMS
PAIN SEVERITY GOAL: 1/10
SUBJECTIVE PAIN PROGRESSION: WAXING AND WANING
PAIN LOCATION: RIGHT HIP
HIGHEST PAIN SEVERITY IN PAST 24 HOURS: 3/10
PERSON REPORTING PAIN: PATIENT
PAIN LOCATION - PAIN SEVERITY: 3/10
LOWEST PAIN SEVERITY IN PAST 24 HOURS: 1/10
PAIN LOCATION - RELIEVING FACTORS: REST AND MEDS
PAIN LOCATION - PAIN FREQUENCY: INTERMITTENT
PAIN LOCATION - PAIN QUALITY: PRESSURE
PAIN: 1

## 2024-12-30 ENCOUNTER — HOME CARE VISIT (OUTPATIENT)
Dept: HOME HEALTH SERVICES | Facility: HOME HEALTH | Age: 59
End: 2024-12-30
Payer: COMMERCIAL

## 2024-12-30 VITALS
OXYGEN SATURATION: 98 % | TEMPERATURE: 96.9 F | SYSTOLIC BLOOD PRESSURE: 126 MMHG | DIASTOLIC BLOOD PRESSURE: 77 MMHG | RESPIRATION RATE: 16 BRPM | HEART RATE: 87 BPM

## 2024-12-30 PROCEDURE — G0151 HHCP-SERV OF PT,EA 15 MIN: HCPCS

## 2024-12-30 SDOH — HEALTH STABILITY: PHYSICAL HEALTH: EXERCISE COMMENTS: ARCHING  HAMSTRING CURLS

## 2024-12-30 SDOH — HEALTH STABILITY: PHYSICAL HEALTH: EXERCISE TYPE: THR

## 2024-12-30 SDOH — HEALTH STABILITY: PHYSICAL HEALTH
EXERCISE COMMENTS: REVIEWED HEP IN SIT STAND AND SUPINE X 15 REPS    SUPINE  ISOMETRIC QUADS  ISOMETRIC GLUTS  ANKLE PUMPS  SAQ  SLR  HIP ABDUCTION  HEEL SLIDES    SITTING  ANKLE PUMPS  LAQ  ISOM HIP ADDUCTION  ACTIVE ABDUCTION    STANDING  TOE RAISES  HIP ABDUCTION  M

## 2024-12-30 ASSESSMENT — ACTIVITIES OF DAILY LIVING (ADL)
AMBULATION ASSISTANCE: 1
CURRENT_FUNCTION: INDEPENDENT
PHYSICAL TRANSFERS ASSESSED: 1
HOME_HEALTH_OASIS: 00
AMBULATION ASSISTANCE: INDEPENDENT
BATHING ASSESSED: 1
PREPARING MEALS: INDEPENDENT
DRESSING_LB_CURRENT_FUNCTION: INDEPENDENT
BATHING_CURRENT_FUNCTION: INDEPENDENT
GROOMING ASSESSED: 1
FEEDING ASSESSED: 1
OASIS_M1830: 00
TOILETING: INDEPENDENT
AMBULATION ASSISTANCE ON UNEVEN SURFACES: 1
AMBULATION ASSISTANCE ON FLAT SURFACES: 1
FEEDING: INDEPENDENT
TOILETING: 1
GROOMING_CURRENT_FUNCTION: INDEPENDENT

## 2024-12-30 ASSESSMENT — ENCOUNTER SYMPTOMS
PAIN LOCATION - RELIEVING FACTORS: ICE AND MEDS
HIGHEST PAIN SEVERITY IN PAST 24 HOURS: 3/10
PERSON REPORTING PAIN: PATIENT
PAIN SEVERITY GOAL: 1/10
MUSCLE WEAKNESS: 1
PAIN LOCATION - EXACERBATING FACTORS: PROLONGED SITTING
PAIN LOCATION - PAIN SEVERITY: 3/10
PAIN LOCATION: RIGHT HIP
PAIN LOCATION - PAIN QUALITY: DULL AND ACHY
PAIN: 1
PAIN LOCATION - PAIN FREQUENCY: INTERMITTENT
LOWEST PAIN SEVERITY IN PAST 24 HOURS: 1/10
SUBJECTIVE PAIN PROGRESSION: WAXING AND WANING

## 2025-01-02 ENCOUNTER — EVALUATION (OUTPATIENT)
Dept: PHYSICAL THERAPY | Facility: CLINIC | Age: 60
End: 2025-01-02
Payer: COMMERCIAL

## 2025-01-02 DIAGNOSIS — G89.18 ACUTE POST-OPERATIVE PAIN: Primary | ICD-10-CM

## 2025-01-02 PROCEDURE — 97112 NEUROMUSCULAR REEDUCATION: CPT | Mod: GP

## 2025-01-02 PROCEDURE — 97110 THERAPEUTIC EXERCISES: CPT | Mod: GP

## 2025-01-02 PROCEDURE — 97161 PT EVAL LOW COMPLEX 20 MIN: CPT | Mod: GP

## 2025-01-02 ASSESSMENT — PAIN - FUNCTIONAL ASSESSMENT: PAIN_FUNCTIONAL_ASSESSMENT: 0-10

## 2025-01-02 ASSESSMENT — PAIN SCALES - GENERAL: PAINLEVEL_OUTOF10: 2

## 2025-01-02 NOTE — PROGRESS NOTES
Physical Therapy Evaluation and Treatment     Patient Name: Tiki Gunter  MRN: [id number]  Encounter date: 1/2/2025  Time Calculation  Start Time: 1030  Stop Time: 1122  Time Calculation (min): 52 min  PT Evaluation Time Entry  PT Evaluation (Low) Time Entry: 20  PT Therapeutic Procedures Time Entry  Neuromuscular Re-Education Time Entry: 10  Therapeutic Exercise Time Entry: 15  Gait Training Time Entry: 2  Therapeutic Activity Time Entry: 3    Visit # 1 of 16  Visits/Dates Authorized: 12/30/24: MMO - NO AUTH / $10 COPAY / NO MAX OOP / [place] VISITS / AVAILITY [id number] / Per Hanh @ [facility] [place], ref: [id number] / [place]    [place] [address] not covered    Current Problem:   Problem List Items Addressed This Visit             ICD-10-CM    Acute post-operative pain - Primary G89.18    Relevant Orders    Follow Up In Physical Therapy     Precautions:  Precautions  Post-Surgical Precautions: Right hip precautions  Precautions Comment: 2 weeks post-op, 4/19/24 L TSR, anatomical (not reverse), GERD, aortic aneurysm       Subjective Evaluation    History of Present Illness  Date of surgery: 12/16/2024  Mechanism of injury: Pt 2 1/2 wks post-op elective R NGUYEN, posterolateral approach.  D/C to sister's home, [facility] PT through 12/30/24, on feet a lot due to care for family member too. Not yet driving.   Pt with prolonged pain and asymmetrical gait pre-op, also underwent L TSR 8 months prior.  Vs pre-op gait improved, was heavily WBing via cane, now able to use lightly.      Pain  Location: mild to moderate pain, sitting uncomfortable due to location of incision/pressure/fullness    Social Support  Lives in: multiple-level home (3 steps w/rail to enter, 12 w/rail to 2nd floor)  Lives with: alone       Pain Assessment: 0-10  0-10 (Numeric) Pain Score: 2    Objective     Observations     Right Hip  Positive for incision.     Additional Observation Details  No signs of infection, steri-strips intact    Tenderness     Additional Tenderness  Details  Tender/fullness at incision and surrounding area R gluteals    Active Range of Motion     Right Hip   Flexion: 90 degrees   Extension: WFL  Adduction: 0 degrees     Strength/Myotome Testing     Right Hip   Planes of Motion   Flexion: 2+  Extension: 2+  Abduction: 2+    Ambulation     Observational Gait   Gait: asymmetric   Decreased walking speed and right stance time.     Additional Observational Gait Details  Using cane out of house, no device at times in house  Mild asymmetries vs post op        Other Measures  Other Outcome Measures: 2-MWT 92m, no device (age and BMI related norm 170m)    Treatments:     Therapeutic Exercise 18355:   NuStep L 5, intermittent cues to maintain neutral hip  Step up 3in  Step tap 3in  Hurdles FW 6inch  Reviewed HEP      Neuromuscular Re-Ed 79686:   Foam DLS EC  RB ML static  RB AP static and shift    Therapeutic Activity 19988:  Reviewed post-op precautions, pt demonstrates understanding    Gait Training 26436:   Reinforced use of cane if gait deficits are increased without it    Manual Therapy 39284:   deferred    Modalities:   deferred     HEP / Access Codes:       Assessment & Plan     Assessment  Impairments: abnormal gait, abnormal muscle tone, abnormal or restricted ROM, activity intolerance, impaired balance, impaired physical strength, lacks appropriate home exercise program, pain with function and weight-bearing intolerance  Assessment details:     Pt is a 58 y/o female 2 1/2 wks s/p elective R NGUYEN with posterolateral approach. Pt with timely post op progress. Expected to progress well to full function.    Low complexity due to patient's clinical presentation being stable and uncomplicated by any significant comorbidities that may affect rehab tolerance and progression.  Prognosis: good    Plan  Therapy options: will be seen for skilled physical therapy services  Planned modality interventions: electrical stimulation/Russian stimulation and thermotherapy  (hydrocollator packs)  Planned therapy interventions: manual therapy, neuromuscular re-education, orthotic fitting/training, postural training, strengthening, stretching, therapeutic activities, home exercise program, gait training, flexibility, functional ROM exercises and body mechanics training  Frequency: 2x week  Duration in visits: 10  Treatment plan discussed with: patient       Post-Treatment Symptoms:  Response to Interventions: No change in pain    Goals:   Active       PT Problem       PT Goals       Start:  01/02/25    Expected End:  04/02/25       1) Indep with HEP  2) Increase 2-MWT to at least 145m from 92m at eval to indicate improved mobility/strength/endurance (age and BMI related norm 170m).  3) Symmetrical gait without cane.           Patient Stated Goals       Start:  01/02/25    Expected End:  04/02/25       1) Pain free R hip  2) Good balance  3) Good ROM

## 2025-01-03 ASSESSMENT — ACTIVITIES OF DAILY LIVING (ADL): POOR_BALANCE: 1

## 2025-01-07 ENCOUNTER — TREATMENT (OUTPATIENT)
Dept: PHYSICAL THERAPY | Facility: CLINIC | Age: 60
End: 2025-01-07
Payer: COMMERCIAL

## 2025-01-07 DIAGNOSIS — Z96.641 S/P HIP REPLACEMENT, RIGHT: Primary | ICD-10-CM

## 2025-01-07 DIAGNOSIS — G89.18 ACUTE POST-OPERATIVE PAIN: ICD-10-CM

## 2025-01-07 PROCEDURE — 97112 NEUROMUSCULAR REEDUCATION: CPT | Mod: GP

## 2025-01-07 PROCEDURE — 97110 THERAPEUTIC EXERCISES: CPT | Mod: GP

## 2025-01-07 ASSESSMENT — PAIN - FUNCTIONAL ASSESSMENT: PAIN_FUNCTIONAL_ASSESSMENT: 0-10

## 2025-01-07 ASSESSMENT — PAIN SCALES - GENERAL: PAINLEVEL_OUTOF10: 4

## 2025-01-07 NOTE — PROGRESS NOTES
Physical Therapy Treatment    Patient Name: Tiki Gunter  MRN: [id number]  Encounter date: 1/7/2025    Time Calculation  Start Time: 0825  Stop Time: 0900  Time Calculation (min): 35 min  PT Therapeutic Procedures Time Entry  Neuromuscular Re-Education Time Entry: 15  Therapeutic Exercise Time Entry: 20    Visit # 2 of 16  Visits/Dates Authorized: 12/30/24: MMO - NO AUTH / $10 COPAY / NO MAX OOP / [place] VISITS / AVAILITY [id number] / Per Hanh @ [facility] [place], ref: [id number] / ds    CPT 60259 not covered    Current Problem:   Problem List Items Addressed This Visit             ICD-10-CM    Acute post-operative pain G89.18    S/P hip replacement, right - Primary Z96.641     Surgery: R NGUYEN posterolateral approach  Surgery date: 12/16/24    Precautions:   Precautions  Precautions Comment: 3 wks post op  Post-Surgical Precautions: Right hip posterolateral precautions; 4/19/24 L TSR, anatomical (not reverse), GERD, aortic aneurysm       Subjective   General:   General Comment: At office 6 hours, a lot of sitting, up to copier often too. Some soreness/stiffness but seems proportionate to tasks.    Pre-Treatment Symptoms:   Pain Assessment: 0-10  0-10 (Numeric) Pain Score: 4    Objective   Findings:    Mild gait asymmetry, no assistive device       Treatments:    Therapeutic Exercise 79509:   NuStep L 5, intermittent cues to maintain neutral hip  Step up 3in  Step tap 3in  Hurdles FW/R/L 6inch      Neuromuscular Re-Ed 73926:   Foam DLS EC  Foam DLS weight shift  RB ML static  RB AP static and shift    Therapeutic Activity 60171:  deferred    Gait Training 67457:   deferred    Manual Therapy 94497:   deferred    Modalities:   deferred     HEP / Access Codes:   1/7/25 reviewed HEP, will update handouts next session    Assessment:   PT Assessment  Assessment Comment: Timely post op progress. Expected to cont gains in function.    Post-Treatment Symptoms:   Response to Interventions: No change in pain (some fatigue from using  muscles)    Plan:    Advance to ability    Goals:   Active       PT Problem       PT Goals       Start:  01/02/25    Expected End:  04/02/25       1) Indep with HEP  2) Increase 2-MWT to at least 145m from 92m at eval to indicate improved mobility/strength/endurance (age and BMI related norm 170m).  3) Symmetrical gait without cane.           Patient Stated Goals       Start:  01/02/25    Expected End:  04/02/25       1) Pain free R hip  2) Good balance  3) Good ROM

## 2025-01-09 ENCOUNTER — APPOINTMENT (OUTPATIENT)
Dept: PHYSICAL THERAPY | Facility: CLINIC | Age: 60
End: 2025-01-09
Payer: COMMERCIAL

## 2025-01-09 ENCOUNTER — TELEPHONE (OUTPATIENT)
Dept: PHYSICAL THERAPY | Facility: CLINIC | Age: 60
End: 2025-01-09
Payer: COMMERCIAL

## 2025-01-14 ENCOUNTER — TREATMENT (OUTPATIENT)
Dept: PHYSICAL THERAPY | Facility: CLINIC | Age: 60
End: 2025-01-14
Payer: COMMERCIAL

## 2025-01-14 DIAGNOSIS — G89.18 ACUTE POST-OPERATIVE PAIN: ICD-10-CM

## 2025-01-14 DIAGNOSIS — Z96.641 S/P HIP REPLACEMENT, RIGHT: Primary | ICD-10-CM

## 2025-01-14 PROCEDURE — 97116 GAIT TRAINING THERAPY: CPT | Mod: GP

## 2025-01-14 PROCEDURE — 97140 MANUAL THERAPY 1/> REGIONS: CPT | Mod: GP

## 2025-01-14 ASSESSMENT — PAIN - FUNCTIONAL ASSESSMENT: PAIN_FUNCTIONAL_ASSESSMENT: 0-10

## 2025-01-15 NOTE — PROGRESS NOTES
Physical Therapy Treatment    Patient Name: Tiki Gunter  MRN: [id number]  Encounter date: 1/14/2025    Time Calculation  Start Time: 1348  Stop Time: 1430  Time Calculation (min): 42 min  PT Therapeutic Procedures Time Entry  Manual Therapy Time Entry: 29  Therapeutic Exercise Time Entry: 6  Gait Training Time Entry: 5    Visit # 3 of 16  Visits/Dates Authorized: 12/30/24: [facility] [place] - NO AUTH / $10 COPAY / NO MAX OOP / [place] VISITS / AVAILITY [id number] / Malik Schafer @ [facility] [place], ref: [id number] / ds    CPT 66180 not covered    Current Problem:   Problem List Items Addressed This Visit             ICD-10-CM    Acute post-operative pain G89.18    S/P hip replacement, right - Primary Z96.641     Surgery: R NGUYEN posterolateral approach  Surgery date: 12/16/24    Precautions:      Post-Surgical Precautions: Right hip posterolateral precautions; 4/19/24 L TSR, anatomical (not reverse), GERD, aortic aneurysm       Subjective   General:   General Comment: R hip bothersome at incision, can feel something catch, steri strips mostly off.    Pre-Treatment Symptoms:   Pain Assessment: 0-10  0-10 (Numeric) Pain Score:  (sore)  Pain Location: Hip    Objective   Findings:    Mild gait asymmetry, no assistive device   Incision healing, no signs of infection, 1 spot of trace scabbing at mid point    Treatments:    Therapeutic Exercise 14561:   sci fit at level 4 x 6 min  Standing knee flexion/quad stretch    Deferred:  NuStep L 5, intermittent cues to maintain neutral hip  Step up 3in  Step tap 3in  Hurdles FW/R/L 6inch      Neuromuscular Re-Ed 68291:   Deferred:  Foam DLS EC  Foam DLS weight shift  RB ML static  RB AP static and shift    Therapeutic Activity 05461:  deferred    Gait Training 71120:   stair negotiation with reciprocal gait and 1 rail x 5    Manual Therapy 73564:   scar massage and STM to posterolateral hip in sidelying   Removed remnant of suture distal incision that was protruding approx 2 inches  Proximal incision also  with protruding suture approx 2 inches, did not come free with gentle tugging via sterile sutures, covered it with a bandage to avoid catching on clothing.    Modalities:   deferred     HEP / Access Codes:   1/7/25 reviewed HEP    Assessment:   PT Assessment  Assessment Comment: More comfortable after remnant of suture removed distally. Proximal remnant may be ready next session.    Post-Treatment Symptoms:   Response to Interventions: Relief    Plan:    Advance to ability    Goals:   Active       PT Problem       PT Goals       Start:  01/02/25    Expected End:  04/02/25       1) Indep with HEP  2) Increase 2-MWT to at least 145m from 92m at eval to indicate improved mobility/strength/endurance (age and BMI related norm 170m).  3) Symmetrical gait without cane.           Patient Stated Goals       Start:  01/02/25    Expected End:  04/02/25       1) Pain free R hip  2) Good balance  3) Good ROM

## 2025-01-16 ENCOUNTER — TREATMENT (OUTPATIENT)
Dept: PHYSICAL THERAPY | Facility: CLINIC | Age: 60
End: 2025-01-16
Payer: COMMERCIAL

## 2025-01-16 DIAGNOSIS — G89.18 ACUTE POST-OPERATIVE PAIN: ICD-10-CM

## 2025-01-16 DIAGNOSIS — Z96.641 S/P HIP REPLACEMENT, RIGHT: Primary | ICD-10-CM

## 2025-01-16 PROCEDURE — 97112 NEUROMUSCULAR REEDUCATION: CPT | Mod: GP,CQ

## 2025-01-16 PROCEDURE — 97110 THERAPEUTIC EXERCISES: CPT | Mod: GP,CQ

## 2025-01-16 ASSESSMENT — PAIN - FUNCTIONAL ASSESSMENT: PAIN_FUNCTIONAL_ASSESSMENT: 0-10

## 2025-01-16 ASSESSMENT — PAIN SCALES - GENERAL: PAINLEVEL_OUTOF10: 0 - NO PAIN

## 2025-01-16 NOTE — PROGRESS NOTES
Physical Therapy Treatment    Patient Name: Tiki Gunter  MRN: [id number]  Encounter date: 1/16/2025 PT Received On: 01/16/25       Visit # 4 of 16  Visits/Dates Authorized: 12/30/24: MMO - NO AUTH / $10 COPAY / NO MAX OOP / [place] VISITS / AVAILITY [id number] / Malik Schafer @ [facility] [place], ref: [id number] / ds    CPT 69672 not covered    Current Problem:   Problem List Items Addressed This Visit             ICD-10-CM    Acute post-operative pain G89.18    S/P hip replacement, right - Primary Z96.641       Surgery: R NGUYEN posterolateral approach  Surgery date: 12/16/24    Precautions:      Post-Surgical Precautions: Right hip posterolateral precautions; 4/19/24 L TSR, anatomical (not reverse), GERD, aortic aneurysm       Subjective  Feeling some muscle soreness after PT not pain.  Scar is sensative now since nothing covering it. One month post op.   MD on 6th week gume  Jan 30th      Pre-Treatment Symptoms:   Pain Assessment: 0-10  0-10 (Numeric) Pain Score: 0 - No pain  Pain Location: Hip    Objective  2min talk test 480 feet 1/16/25   (146 meter)  Findings:    Mild gait asymmetry, no assistive device   Incision healing, no signs of infection, 1 spot of trace scabbing at mid point    Treatments:    Therapeutic Exercise 81925:   NuStep L 5, intermittent cues to maintain neutral hip 7min  Step up 3in  Laq #5 2x10  Ham curl 40# 2x10  Hurdles FW/R/L 6inch  STS not deep ROM (raise table) 25 inches updated HEP    Neuromuscular Re-Ed 92338:   Tandem step with arm curls or punch outs  Foam DLS EC  Foam DLS weight shift  RB ML static and tap  RB AP static and shift  Step tap 6 inches firm      Therapeutic Activity 97007:  deferred    Gait Training 09468: DNP  stair negotiation with reciprocal gait and 1 rail x 5    Manual Therapy 73371: DNP  scar massage and STM to posterolateral hip in sidelying   Removed remnant of suture distal incision that was protruding approx 2 inches  Proximal incision also with protruding suture approx 2  inches, did not come free with gentle tugging via sterile sutures, covered it with a bandage to avoid catching on clothing.    Modalities:   deferred     HEP / Access Codes:   1/7/25 reviewed HEP    Assessment:  Good effort with session. Per patient even doing reciprocal at stairs at home on occasion.  Feeling more confident in strength. Improved 2 min walk test today. Good tolerance to upgrades       Post-Treatment Symptoms:    Same just tired    Plan:    Advance to ability    Goals:   Active       PT Problem       PT Goals       Start:  01/02/25    Expected End:  04/02/25       1) Indep with HEP  2) Increase 2-MWT to at least 145m from 92m at eval to indicate improved mobility/strength/endurance (age and BMI related norm 170m).  3) Symmetrical gait without cane.           Patient Stated Goals       Start:  01/02/25    Expected End:  04/02/25       1) Pain free R hip  2) Good balance  3) Good ROM

## 2025-01-21 ENCOUNTER — TREATMENT (OUTPATIENT)
Dept: PHYSICAL THERAPY | Facility: CLINIC | Age: 60
End: 2025-01-21
Payer: COMMERCIAL

## 2025-01-21 DIAGNOSIS — Z96.641 S/P HIP REPLACEMENT, RIGHT: Primary | ICD-10-CM

## 2025-01-21 DIAGNOSIS — G89.18 ACUTE POST-OPERATIVE PAIN: ICD-10-CM

## 2025-01-21 PROCEDURE — 97112 NEUROMUSCULAR REEDUCATION: CPT | Mod: GP,CQ

## 2025-01-21 PROCEDURE — 97110 THERAPEUTIC EXERCISES: CPT | Mod: GP,CQ

## 2025-01-21 ASSESSMENT — PAIN - FUNCTIONAL ASSESSMENT: PAIN_FUNCTIONAL_ASSESSMENT: 0-10

## 2025-01-21 ASSESSMENT — PAIN SCALES - GENERAL: PAINLEVEL_OUTOF10: 2

## 2025-01-21 NOTE — ASSESSMENT & PLAN NOTE
Anticipatory guidance given.  
Continue amlodipine for now.  Since she is about ready to have hip surgery in 2 weeks will not change her blood pressure medication.  She will recheck with me in about 3 months.  Will recheck blood pressure and consider that time switching back to lisinopril or losartan which she inquired about.  
Continue omeprazole.  Recheck in 6 months.  
Recommend splint continue splinting especially at night for now.  If no improvement over the next month or 2 she will let me know in that time would consider thumb injection.  Or referral.  She has seen Dr. Tyson in the past for shoulder surgery.  
Will keep off medication since she has been intolerant of statins and Zetia.  Will return here in in 6 months and check cholesterol at that time.  In the meantime consider red rice yeast extract.  Improve low-fat diet.  
(V5) oriented, appropriate

## 2025-01-21 NOTE — PROGRESS NOTES
Physical Therapy Treatment    Patient Name: Tiki Gunter  MRN: [id number]  Encounter date: 1/21/2025 PT Received On: 01/21/25  Time Calculation  Start Time: 1330  Stop Time: 1410  Time Calculation (min): 40 min  PT Therapeutic Procedures Time Entry  Neuromuscular Re-Education Time Entry: 10  Therapeutic Exercise Time Entry: 25  Gait Training Time Entry: 5    Visit # 5 of 16  Visits/Dates Authorized: 12/30/24: MMO - NO AUTH / $10 COPAY / NO MAX OOP / [place] VISITS / AVAILITY [id number] / Per Hanh @ [facility] [place], ref: [id number] / ds    CPT 60585 not covered    Current Problem:   Problem List Items Addressed This Visit             ICD-10-CM    Acute post-operative pain G89.18    S/P hip replacement, right - Primary Z96.641         Surgery: R NGUYEN posterolateral approach  Surgery date: 12/16/24    Precautions:      Post-Surgical Precautions: Right hip posterolateral precautions; 4/19/24 L TSR, anatomical (not reverse), GERD, aortic aneurysm       Subjective  Feeling some muscle soreness after PT not pain.  Still have a hard time not letting knees in on squats but much more conscience of it    Pre-Treatment Symptoms:   Pain Assessment: 0-10  0-10 (Numeric) Pain Score: 2  Pain Location: Hip    Objective  2min talk test 480 feet 1/16/25   (146 meter)  Findings:  Patent able to verbalize all hip precautions   Mild gait asymmetry, no assistive device   Incision healing, no signs of infection, 1 spot of trace scabbing at mid point    Treatments:    Therapeutic Exercise 05461:   NuStep L 5, intermittent cues to maintain neutral hip 7min  Step up 3in  Laq #5 2x10  Ham curl 40# 3x10  Hurdles FW/R/L 6inch  STS not deep ROM (raise table) 25 inches updated HEP  Wgt carry unilateral #10  Shuttle   b 62#-75 with band around knees to promote nuetral knee position    single 37# 2x15     heel raises 37# 2x15    Neuromuscular Re-Ed 19139:   Tandem step with arm curls or punch outs  Foam DLS EC  Foam DLS weight shift  RB ML static and tap  RB  AP static and shift  Step tap 6 inches firm      Therapeutic Activity 36630:  deferred    Gait Training 15763:   stair negotiation with reciprocal gait and 1 rail x 5 no rail    Manual Therapy 09492: DNP  scar massage and STM to posterolateral hip in sidelying   Removed remnant of suture distal incision that was protruding approx 2 inches  Proximal incision also with protruding suture approx 2 inches, did not come free with gentle tugging via sterile sutures, covered it with a bandage to avoid catching on clothing.    Modalities:   deferred     HEP / Access Codes:   1/7/25 reviewed HEP    Assessment:  Good effort with session.Patient more achey then sore. Getting used to doing more therex and movement. Using cane if needed. Increased valgus left knee causing patient to feel knock kneed.         Post-Treatment Symptoms:    Same just tired    Plan:    Advance to ability    Goals:   Active       PT Problem       PT Goals       Start:  01/02/25    Expected End:  04/02/25       1) Indep with HEP  2) Increase 2-MWT to at least 145m from 92m at eval to indicate improved mobility/strength/endurance (age and BMI related norm 170m).  3) Symmetrical gait without cane.           Patient Stated Goals       Start:  01/02/25    Expected End:  04/02/25       1) Pain free R hip  2) Good balance  3) Good ROM

## 2025-01-23 ENCOUNTER — TREATMENT (OUTPATIENT)
Dept: PHYSICAL THERAPY | Facility: CLINIC | Age: 60
End: 2025-01-23
Payer: COMMERCIAL

## 2025-01-23 DIAGNOSIS — Z96.641 S/P HIP REPLACEMENT, RIGHT: Primary | ICD-10-CM

## 2025-01-23 DIAGNOSIS — G89.18 ACUTE POST-OPERATIVE PAIN: ICD-10-CM

## 2025-01-23 PROCEDURE — 97110 THERAPEUTIC EXERCISES: CPT | Mod: GP

## 2025-01-23 ASSESSMENT — PAIN SCALES - GENERAL: PAINLEVEL_OUTOF10: 2

## 2025-01-23 ASSESSMENT — PAIN - FUNCTIONAL ASSESSMENT: PAIN_FUNCTIONAL_ASSESSMENT: 0-10

## 2025-01-23 NOTE — PROGRESS NOTES
Physical Therapy Treatment    Patient Name: Tiki Gunter  MRN: [id number]  Encounter date: 1/23/2025    Time Calculation  Start Time: 0830  Stop Time: 0915  Time Calculation (min): 45 min  PT Therapeutic Procedures Time Entry  Manual Therapy Time Entry: 5  Therapeutic Exercise Time Entry: 35    Visit # 6 of 16  Visits/Dates Authorized:   2025 [facility]MO - NO AUTH / $10 COPAY / NO MAX OOP / [place] VISITS / AVAILITY [id number] / ds 1/23/25.   CPT 14174 Not covered. CPT 87095 requires authorization.   12/30/24: MMO - NO AUTH / $10 COPAY / NO MAX OOP / MN VISITS / AVAILITY [id number] / Per Hanh @ [facility] [place], ref: [id number] / ds    CPT 66626 not covered    Current Problem:   Problem List Items Addressed This Visit             ICD-10-CM    Acute post-operative pain G89.18    S/P hip replacement, right - Primary Z96.641       Surgery: R NGUYEN posterolateral approach  Surgery date: 12/16/24    Precautions:   Precautions  Precautions Comment: 5 weeks, 2 days post op  Post-Surgical Precautions: Right hip posterolateral precautions; 4/19/24 L TSR, anatomical (not reverse), GERD, aortic aneurysm       Subjective    General Comment: No longer needing to nap after busy days. Not having the pain with sitting like before.      Pre-Treatment Symptoms:   Pain Assessment: 0-10  0-10 (Numeric) Pain Score: 2    Objective    1/16/25 2min walk test 480 feet 1/16/25   (146 meter)  Findings:   Mild gait asymmetry, no assistive device   Incision healing, no signs of infection,  1/23/25 remnant of nylon suture further emerged proximally, removed easily. 2 dissolvable sutures emerging proximal 1/3 of incision but not coming loose without effort    Treatments:    Therapeutic Exercise 40726:   NuStep L 5, intermittent cues to maintain neutral hip 7min  Step up 3in  Laq #5 2x10  Shuttle B 62#-75 with band around knees to promote neutral knee position    single 37# 2x15     heel raises 37# 2x15  Ham curl 40# 3x10    Deferred:  Hurdles FW/R/L 6inch  STS not  deep ROM (raise table) 25 inches updated HEP  Wgt carry unilateral #10    Neuromuscular Re-Ed 32566:   Tandem step with arm curls or punch outs  Foam DLS EC  Foam DLS weight shift  RB ML static and tap  RB AP static and shift  Step tap 6 inches firm      Manual Therapy 03187:   scar massage and STM to posterolateral hip in sidelying   Removed remnant of suture proximal incision, attempted others without success      Modalities:   deferred     HEP / Access Codes:   1/7/25 reviewed HEP    Assessment:   PT Assessment  Assessment Comment: Improving comfort and activity tolerance    Post-Treatment Symptoms:   Response to Interventions: No change in pain    Plan:    Advance to ability    Goals:   Active       PT Problem       PT Goals       Start:  01/02/25    Expected End:  04/02/25       1) Indep with HEP  2) Increase 2-MWT to at least 145m from 92m at eval to indicate improved mobility/strength/endurance (age and BMI related norm 170m).  3) Symmetrical gait without cane.           Patient Stated Goals       Start:  01/02/25    Expected End:  04/02/25       1) Pain free R hip  2) Good balance  3) Good ROM

## 2025-01-28 ENCOUNTER — TREATMENT (OUTPATIENT)
Dept: PHYSICAL THERAPY | Facility: CLINIC | Age: 60
End: 2025-01-28
Payer: COMMERCIAL

## 2025-01-28 DIAGNOSIS — G89.18 ACUTE POST-OPERATIVE PAIN: ICD-10-CM

## 2025-01-28 DIAGNOSIS — Z96.641 S/P HIP REPLACEMENT, RIGHT: Primary | ICD-10-CM

## 2025-01-28 PROCEDURE — 97112 NEUROMUSCULAR REEDUCATION: CPT | Mod: GP

## 2025-01-28 PROCEDURE — 97110 THERAPEUTIC EXERCISES: CPT | Mod: GP

## 2025-01-28 PROCEDURE — 97140 MANUAL THERAPY 1/> REGIONS: CPT | Mod: GP

## 2025-01-28 ASSESSMENT — PAIN SCALES - GENERAL: PAINLEVEL_OUTOF10: 2

## 2025-01-28 ASSESSMENT — PAIN - FUNCTIONAL ASSESSMENT: PAIN_FUNCTIONAL_ASSESSMENT: 0-10

## 2025-01-28 NOTE — PROGRESS NOTES
Physical Therapy Treatment    Patient Name: Tiki Gunter  MRN: [id number]  Encounter date: 1/28/2025    Time Calculation  Start Time: 1045  Stop Time: 1130  Time Calculation (min): 45 min  PT Therapeutic Procedures Time Entry  Manual Therapy Time Entry: 15  Neuromuscular Re-Education Time Entry: 15  Therapeutic Exercise Time Entry: 10    Visit # 7 of 16  Visits/Dates Authorized:   2025 MMO - NO AUTH / $10 COPAY / NO MAX OOP / MN VISITS / AVAILITY [id number] / ds 1/23/25.   CPT 19233 Not covered. CPT 42385 requires authorization.   12/30/24: MMO - NO AUTH / $10 COPAY / NO MAX OOP / MN VISITS / CPT 04449 not covered    Current Problem:   Problem List Items Addressed This Visit             ICD-10-CM    Acute post-operative pain G89.18    S/P hip replacement, right - Primary Z96.641       Surgery: R NGUYEN posterolateral approach  Surgery date: 12/16/24    Precautions:   Precautions  Precautions Comment: 6 wks post op  Post-Surgical Precautions: Right hip posterolateral precautions; 4/19/24 L TSR, anatomical (not reverse), GERD, aortic aneurysm       Subjective    General Comment: Continues to have gradual progress in function and comfort.      Pre-Treatment Symptoms:   Pain Assessment: 0-10  0-10 (Numeric) Pain Score: 2    Objective      Findings:   Mild gait asymmetry, no assistive device   Incision healing, no signs of infection, no gross adhesions, no remaining visible suture remnants  Non-uniform mm R gluteals, guarding  1/23/25 remnant of nylon suture further emerged proximally, removed easily. 2 dissolvable sutures emerging proximal   1/16/25 2min walk test 480 feet 1/16/25   (146 meter)  1/3/25 of incision but not coming loose without effort    Treatments:    Therapeutic Exercise 99673:   NuStep L 5, intermittent cues to maintain neutral hip 8min  Step up 3in  Hurdles for ROM FW/R/L 6inch    Deferred:  Laq #5 2x10  Shuttle B 62#-75 with band around knees to promote neutral knee position    single 37# 2x15      heel raises 37# 2x15  Ham curl 40# 3x10  STS not deep ROM (raise table) 25 inches updated HEP  Wgt carry unilateral #10    Neuromuscular Re-Ed 75284:   Foam DLS EC  Foam DLS weight shift  RB ML static and tap  RB AP static and shift  Step tap 6 inches firm    Deferred:  Tandem step with arm curls or punch outs    Manual Therapy 74804:   scar massage and STM to R posterolateral hip in L sidelying       Modalities:   deferred     HEP / Access Codes:   1/7/25 reviewed HEP    Assessment:   PT Assessment  Assessment Comment: Continued progress, further progress expected with completing POC.    Post-Treatment Symptoms:   Response to Interventions: Relief (looser after STM/scar massage)    Plan:    Advance to ability    Goals:   Active       PT Problem       PT Goals       Start:  01/02/25    Expected End:  04/02/25       1) Indep with HEP  2) Increase 2-MWT to at least 145m from 92m at eval to indicate improved mobility/strength/endurance (age and BMI related norm 170m).  3) Symmetrical gait without cane.           Patient Stated Goals       Start:  01/02/25    Expected End:  04/02/25       1) Pain free R hip  2) Good balance  3) Good ROM

## 2025-01-30 ENCOUNTER — HOSPITAL ENCOUNTER (OUTPATIENT)
Dept: RADIOLOGY | Facility: CLINIC | Age: 60
Discharge: HOME | End: 2025-01-30
Payer: COMMERCIAL

## 2025-01-30 ENCOUNTER — OFFICE VISIT (OUTPATIENT)
Dept: ORTHOPEDIC SURGERY | Facility: CLINIC | Age: 60
End: 2025-01-30
Payer: COMMERCIAL

## 2025-01-30 DIAGNOSIS — Z96.641 S/P HIP REPLACEMENT, RIGHT: ICD-10-CM

## 2025-01-30 PROCEDURE — 73502 X-RAY EXAM HIP UNI 2-3 VIEWS: CPT | Mod: RT

## 2025-01-30 PROCEDURE — 99211 OFF/OP EST MAY X REQ PHY/QHP: CPT | Performed by: PHYSICIAN ASSISTANT

## 2025-01-30 NOTE — PROGRESS NOTES
Aleshia Medina, MELLISA, PA-C, ATC  Adult Reconstruction and Joint Replacement Surgery  Phone: [phone]     Fax:[phone]            Chief Complaint   Patient presents with    Right Hip - Post-op     Rt. NGUYEN SR24       HPI:    Tiki Gunter is a pleasant 59 y.o. year-old female here for follow-up of their side: right total hip arthroplasty by Dr. Hernandez.  The patient is approximately 6 week(s) postop.The patient has no mechanical symptoms.  The patient has mild swelling and pain.   The patients wound has healed uneventfully.  The patient has been doing HEP and/or outpatient PT.  No complications postoperatively.      Review of Systems  Past Medical History:   Diagnosis Date    Arthritis     Ascending aorta dilatation (CMS-HCC)     Delayed emergence from general anesthesia     Eczema     GERD (gastroesophageal reflux disease)     Hyperlipidemia     Hypertension      Patient Active Problem List   Diagnosis    Aortic aneurysm (CMS-HCC)    Elevated coronary artery calcium score    Essential (primary) hypertension    Female climacteric state    Gastro-esophageal reflux disease without esophagitis    Obesity    Perimenopause    Pure hypercholesterolemia    Well adult exam    Primary osteoarthritis of right hip    Primary osteoarthritis, left shoulder    S/P shoulder replacement, left    Unilateral primary osteoarthritis, right hip    Biceps tendinitis, left    Postoperative pain    Trigger finger of right thumb    Acute post-operative pain    S/P hip replacement, right     Medication Documentation Review Audit       Reviewed by Alena Chow LPN (Licensed Nurse) on 25 at 1005      Medication Order Taking? Sig Documenting Provider Last Dose Status   amLODIPine (Norvasc) 5 mg tablet [id number] Yes Take 1 tablet (5 mg) by mouth once daily. El Tang MD 2024 Morning Active   ascorbic acid (Vitamin C) 1,000 mg tablet [id number] Yes Take 1 tablet (1,000 mg) by mouth once daily.  Parvin Lindquist MD Past Week Active   cetirizine (ZyrTEC) 10 mg chewable tablet [id number] Yes Chew 1 tablet (10 mg) if needed for allergies. Parvin Lindquist MD Past Month Active   cholecalciferol (Vitamin D-3) 125 MCG (5000 UT) capsule [id number] Yes Take 1 capsule (125 mcg) by mouth once daily. Parvin Lindquist MD Past Week Active   EpiCeram lotion [id number] Yes Apply topically. 2-3 times per day Parvin Lindquist MD More than a month Active   magnesium oxide (Mag-Ox) 400 mg tablet [id number] Yes Take 1 tablet (400 mg) by mouth once daily. Parvin Lindquist MD Past Week Active   omeprazole (PriLOSEC) 20 mg DR capsule [id number] Yes Take 1 capsule (20 mg) by mouth every other day. El Tang MD 12/15/2024 Active   penicillin v potassium (Veetid) 500 mg tablet [id number] Yes TAKE 4 TABLETS BY MOUTH 1 HOUR PRIOR TO PROCEDURE, AND 2 TABLETS EVERY 6 HOURS POST PROCEDURE Parvin Lindquist MD Unknown Active     Discontinued 25 1005   tretinoin (Retin-A) 0.05 % cream [id number] Yes APPLY TO AFFECTED AREAS AT NIGHTTIME Parvin Lindquist MD Past Week Active                  Allergies   Allergen Reactions    Zetia [Ezetimibe] Dizziness     Jaw pain    Rosuvastatin Rash and Myalgia    Statins-Hmg-Coa Reductase Inhibitors Rash and Myalgia     Social History     Socioeconomic History    Marital status:      Spouse name: Not on file    Number of children: Not on file    Years of education: Not on file    Highest education level: Not on file   Occupational History    Not on file   Tobacco Use    Smoking status: Former     Current packs/day: 0.00     Average packs/day: 0.3 packs/day for 1 year (0.3 ttl pk-yrs)     Types: Cigarettes     Start date:      Quit date:      Years since quittin.0     Passive exposure: Never    Smokeless tobacco: Never    Tobacco comments:     I quit smoking in    Vaping Use    Vaping status: Never Used   Substance and Sexual Activity    Alcohol  use: Yes     Alcohol/week: 2.0 standard drinks of alcohol    Drug use: Never    Sexual activity: Not Currently     Partners: Male     Birth control/protection: Post-menopausal   Other Topics Concern    Not on file   Social History Narrative    Not on file     Social Drivers of Health     Financial Resource Strain: Low Risk  (2/13/2024)    Received from [facility]    Overall Financial Resource Strain (CARDIA)     Difficulty of Paying Living Expenses: Not hard at all   Food Insecurity: No Food Insecurity (2/13/2024)    Received from [facility]    Hunger Vital Sign     Worried About Running Out of Food in the Last Year: Never true     Ran Out of Food in the Last Year: Never true   Transportation Needs: No Transportation Needs (12/30/2024)    OASIS : Transportation     Lack of Transportation (Medical): No     Lack of Transportation (Non-Medical): No     Patient Unable or Declines to Respond: No   Physical Activity: Inactive (2/13/2024)    Received from [facility]    Exercise Vital Sign     Days of Exercise per Week: 0 days     Minutes of Exercise per Session: 0 min   Stress: No Stress Concern Present (2/13/2024)    Received from [facility] - Occupational Stress Questionnaire     Feeling of Stress : Not at all   Social Connections: Feeling Socially Integrated (12/30/2024)    OASIS : Social Isolation     Frequency of experiencing loneliness or isolation: Never   Intimate Partner Violence: Not At Risk (2/13/2024)    Received from [facility]    Humiliation, Afraid, Rape, and Kick questionnaire     Fear of Current or Ex-Partner: No     Emotionally Abused: No     Physically Abused: No     Sexually Abused: No   Housing Stability: Low Risk  (2/13/2024)    Received from [facility]    Housing Stability Vital Sign     Unable to Pay for Housing in  the Last Year: No     Number of Places Lived in the Last Year: 1     Unstable Housing in the Last Year: No     Past Surgical History:   Procedure Laterality Date    BUNIONECTOMY Bilateral     COLONOSCOPY  2019    COSMETIC SURGERY      ENDOMETRIAL ABLATION      HIP ARTHROPLASTY Left 2021    JOINT REPLACEMENT  3/31/2021 4/19/2024    IL BREAST AUGMENTATION WITH IMPLANT  2006    TONSILLECTOMY      TOTAL SHOULDER ARTHROPLASTY Left 04/19/2024    WISDOM TOOTH EXTRACTION         Physical Exam  side: right Hip  There were no vitals filed for this visit.  AxO x 3 in NAD, appears stated age. Cooperative.   Assistive Device: no device. Coordination and balance intact.  Skin inact over bilateral upper and lower extremities, no erythema, ecchymosis, temperature changes. No popliteal lymphadenopathy,  No other overlying lesion  mood: euthymic  Respirations non labored  Surgical hip demonstrates pain free ROM with mild tenderness to palpation over greater trochanter laterally.  The incision is midline healing well with no signs of surrounding infection, dehiscence or drainage.   Neurovascularly back to baseline  5/5 hip flexion/knee extension/DF/PF/EHL  SILT in santa/saph/ per/tib distribution   Extremities warm and well perfused.  No lower extremity calf tenderness or swelling  2+ Femoral/DP/PT pulses bilaterally    IMAGING:  No images are attached to the encounter.    I personally reviewed multiple views of the hip today in clinic.  Status post side: right Total Hip Arthroplasty. The implant is well fixed, well aligned.  No evidence of angelina-implant fracture, lucency or dislocation. Leg lengths closely restored.    Impression/Plan:    Tiki Gunter is doing well post-operatively and happy with the results of the operation.     S/P Total side: right Hip  Arthroplasty  I talked with patient at length about activity precautions and dislocation precautions in detail. I talked with patient at length about activity precautions and  dislocation precautions in detail which include avoidance of hip flexion > 90 degrees with simultaneous internal rotation.  Patient can progress activities as tolerated. And understands their permanent precautions. At this time, you may gradually increase your activities and get back to a normal, low-impact lifestyle. Please avoid running, jumping, and heavy lifting.    This was  demonstrated in the room by OMKAR and patient verbalized understanding. Patient should also limit running, jumping, weight lifting and repetitive activity. The patient verbalizes understanding of these permanent precautions.        2. Continue HEP or outpatient PT, per protocol.    3. Continue Post-operative instructions.    4. Discussed the importance of dental prophylactic dental antibiotics lifelong. Patient may request medication refill through MyChart,       Pharmacy or surgeons office.    All questions were answered.    Follow-up 1 year with xrays at next visit.      MELLISA Jenkins, PA-C, ATC  Orthopedic Physician Assisant  Adult Reconstruction and Total Joint Replacement  General Orthopedics  Department of Orthopaedic Surgery  [place] [address]  [employer] messaging preferred

## 2025-01-31 ENCOUNTER — TREATMENT (OUTPATIENT)
Dept: PHYSICAL THERAPY | Facility: CLINIC | Age: 60
End: 2025-01-31
Payer: COMMERCIAL

## 2025-01-31 DIAGNOSIS — Z96.641 S/P HIP REPLACEMENT, RIGHT: Primary | ICD-10-CM

## 2025-01-31 DIAGNOSIS — G89.18 ACUTE POST-OPERATIVE PAIN: ICD-10-CM

## 2025-01-31 PROCEDURE — 97110 THERAPEUTIC EXERCISES: CPT | Mod: GP

## 2025-01-31 ASSESSMENT — PAIN SCALES - GENERAL: PAINLEVEL_OUTOF10: 2

## 2025-01-31 ASSESSMENT — PAIN - FUNCTIONAL ASSESSMENT: PAIN_FUNCTIONAL_ASSESSMENT: 0-10

## 2025-01-31 NOTE — PROGRESS NOTES
Physical Therapy Treatment    Patient Name: Tiki Gunter  MRN: [id number]  Encounter date: 1/31/2025    Time Calculation  Start Time: 0933  Stop Time: 1020  Time Calculation (min): 47 min  PT Therapeutic Procedures Time Entry  Therapeutic Exercise Time Entry: 40    Visit # 8 of 16  Visits/Dates Authorized:   2025 MMO - NO AUTH / $10 COPAY / NO MAX OOP / MN VISITS / AVAILITY [id number] / ds 1/23/25.   CPT 48549 Not covered. CPT 54784 requires authorization.   12/30/24: MMO - NO AUTH / $10 COPAY / NO MAX OOP / MN VISITS / CPT 07728 not covered    Current Problem:   Problem List Items Addressed This Visit             ICD-10-CM    Acute post-operative pain G89.18    S/P hip replacement, right - Primary Z96.641       Surgery: R NGUYEN posterolateral approach  Surgery date: 12/16/24    Precautions:   Precautions  Precautions Comment: 6.5 wks post op  Post-Surgical Precautions: Right hip posterolateral precautions; 4/19/24 L TSR, anatomical (not reverse), GERD, aortic aneurysm       Subjective    General Comment: Gradually better, driving going well. Surgeon follow up this week, cont precaution of not reaching beside her while seated. No longer feeling any snags from incision      Pre-Treatment Symptoms:   Pain Assessment: 0-10  0-10 (Numeric) Pain Score: 2    Objective      Findings:   Mild gait asymmetry, no assistive device   Incision healing, no signs of infection, no gross adhesions, no remaining visible suture remnants  Non-uniform mm R gluteals, guarding  1/23/25 remnant of nylon suture further emerged proximally, removed easily. 2 dissolvable sutures emerging proximal   1/16/25 2min walk test 480 feet (146 meter)  1/3/25 of incision but not coming loose without effort    Treatments:    Therapeutic Exercise 00903:   NuStep L6, intermittent cues to maintain neutral hip 8min  Step up 3in  Hurdles for ROM FW/R/L 6inch  Laq #5 2x15  Shuttle B 75# 2x15, single 37# 2x15      Shuttle heel raises 37# 2x15  Ham curl 40#  x40    Deferred:  STS not deep ROM (raise table) 25 inches updated HEP  Wgt carry unilateral #10    Neuromuscular Re-Ed 16956:   Deferred:  Foam DLS EC  Foam DLS weight shift  RB ML static and tap  RB AP static and shift  Step tap 6 inches firm  Tandem step with arm curls or punch outs    Manual Therapy 60200:   Deferred: scar massage and STM to R posterolateral hip in L sidelying       Modalities:   deferred     HEP / Access Codes:   1/7/25 reviewed HEP    Assessment:   PT Assessment  Assessment Comment: Continued progress, further progress expected with completing POC.    Post-Treatment Symptoms:   Response to Interventions: Relief    Plan:    Advance to ability    Goals:   Active       PT Problem       PT Goals       Start:  01/02/25    Expected End:  04/02/25       1) Indep with HEP  2) Increase 2-MWT to at least 145m from 92m at eval to indicate improved mobility/strength/endurance (age and BMI related norm 170m).  3) Symmetrical gait without cane.           Patient Stated Goals       Start:  01/02/25    Expected End:  04/02/25       1) Pain free R hip  2) Good balance  3) Good ROM

## 2025-02-03 ENCOUNTER — TREATMENT (OUTPATIENT)
Dept: PHYSICAL THERAPY | Facility: CLINIC | Age: 60
End: 2025-02-03
Payer: COMMERCIAL

## 2025-02-03 DIAGNOSIS — G89.18 ACUTE POST-OPERATIVE PAIN: Primary | ICD-10-CM

## 2025-02-03 DIAGNOSIS — Z96.641 S/P HIP REPLACEMENT, RIGHT: ICD-10-CM

## 2025-02-03 PROCEDURE — 97110 THERAPEUTIC EXERCISES: CPT | Mod: GP

## 2025-02-03 ASSESSMENT — PAIN SCALES - GENERAL: PAINLEVEL_OUTOF10: 1

## 2025-02-03 ASSESSMENT — PAIN - FUNCTIONAL ASSESSMENT: PAIN_FUNCTIONAL_ASSESSMENT: 0-10

## 2025-02-03 NOTE — PROGRESS NOTES
Physical Therapy Treatment    Patient Name: Tiki Gunter  MRN: [id number]  Encounter date: 2/3/2025    Time Calculation  Start Time: 1003  Stop Time: 1047  Time Calculation (min): 44 min  PT Therapeutic Procedures Time Entry  Therapeutic Exercise Time Entry: 40    Visit # 9 of 16  Visits/Dates Authorized:   2025 MMO - NO AUTH / $10 COPAY / NO MAX OOP / MN VISITS / AVAILITY [id number] / ds 1/23/25.   CPT 41379 Not covered. CPT 64602 requires authorization.   12/30/24: MMO - NO AUTH / $10 COPAY / NO MAX OOP / MN VISITS / CPT 94189 not covered    Current Problem:   Problem List Items Addressed This Visit             ICD-10-CM    Acute post-operative pain - Primary G89.18    S/P hip replacement, right Z96.641       Surgery: R NGUYEN posterolateral approach  Surgery date: 12/16/24    Precautions:   Precautions  Precautions Comment: 7 wks post op  Post-Surgical Precautions: Right hip posterolateral precautions; 4/19/24 L TSR, anatomical (not reverse), GERD, aortic aneurysm       Subjective    General Comment: Some soreness but related to extra walking and stairs attending [employer] last night. Sitting was comfortable, padded floor seat. At home, challenging to complete ascending stairs reciprocally full flight.      Pre-Treatment Symptoms:   Pain Assessment: 0-10  0-10 (Numeric) Pain Score: 1    Objective      Findings:   Mild gait asymmetry, no assistive device   Incision healing, no signs of infection, no gross adhesions, no remaining visible suture remnants  Non-uniform mm R gluteals, guarding  1/23/25 remnant of nylon suture further emerged proximally, removed easily. 2 dissolvable sutures emerging proximal   1/16/25 2min walk test 480 feet (146 meter)  1/3/25 of incision but not coming loose without effort    Treatments:    Therapeutic Exercise 95385:   NuStep L6, intermittent cues to maintain neutral hip 8min  Resisted gait lat 10#, FW/BW 15# x4 ea  Hurdles for ROM FW/R/L 6inch  Shuttle B 75# 2x15, single 37# 3x15       Shuttle heel raises 37# 3x15  Ham curl 4# 3x15  Leg ext #5 2x15    Deferred:  Step up 3in  STS not deep ROM (raise table) 25 inches updated HEP  Wgt carry unilateral #10    Neuromuscular Re-Ed 48957:   Deferred:  Foam DLS EC  Foam DLS weight shift  RB ML static and tap  RB AP static and shift  Step tap 6 inches firm  Tandem step with arm curls or punch outs    Manual Therapy 76803:   Deferred: scar massage and STM to R posterolateral hip in L sidelying       Modalities:   deferred     HEP / Access Codes:   1/7/25 reviewed HEP    Assessment:   PT Assessment  Assessment Comment: Gradual progress in function. One more visit anticipated then D/C    Post-Treatment Symptoms:   Response to Interventions: No change in pain    Plan:    Advance to ability    Goals:   Active       PT Problem       PT Goals       Start:  01/02/25    Expected End:  04/02/25       1) Indep with HEP  2) Increase 2-MWT to at least 145m from 92m at eval to indicate improved mobility/strength/endurance (age and BMI related norm 170m).  3) Symmetrical gait without cane.           Patient Stated Goals       Start:  01/02/25    Expected End:  04/02/25       1) Pain free R hip  2) Good balance  3) Good ROM

## 2025-02-06 ENCOUNTER — TREATMENT (OUTPATIENT)
Dept: PHYSICAL THERAPY | Facility: CLINIC | Age: 60
End: 2025-02-06
Payer: COMMERCIAL

## 2025-02-06 DIAGNOSIS — G89.18 ACUTE POST-OPERATIVE PAIN: ICD-10-CM

## 2025-02-06 DIAGNOSIS — Z96.641 S/P HIP REPLACEMENT, RIGHT: Primary | ICD-10-CM

## 2025-02-06 PROCEDURE — 97110 THERAPEUTIC EXERCISES: CPT | Mod: GP

## 2025-02-06 ASSESSMENT — PAIN - FUNCTIONAL ASSESSMENT: PAIN_FUNCTIONAL_ASSESSMENT: 0-10

## 2025-02-06 ASSESSMENT — PAIN SCALES - GENERAL: PAINLEVEL_OUTOF10: 2

## 2025-02-06 NOTE — PROGRESS NOTES
Physical Therapy Treatment/Discharge Summary    Patient Name: Tiki Gunter  MRN: [id number]  Encounter date: 2/6/2025    Time Calculation  Start Time: 1000  Stop Time: 1045  Time Calculation (min): 45 min  PT Therapeutic Procedures Time Entry  Therapeutic Exercise Time Entry: 40    Visit # 10 of 16  Visits/Dates Authorized:   2025 MMO - NO AUTH / $10 COPAY / NO MAX OOP / MN VISITS / AVAILITY [id number] / ds 1/23/25.   CPT 80615 Not covered. CPT 05343 requires authorization.   12/30/24: MMO - NO AUTH / $10 COPAY / NO MAX OOP / MN VISITS / CPT 68734 not covered    Current Problem:   Problem List Items Addressed This Visit             ICD-10-CM    Acute post-operative pain G89.18    S/P hip replacement, right - Primary Z96.641       Surgery: R NGUYEN posterolateral approach  Surgery date: 12/16/24    Precautions:   Precautions  Precautions Comment: 7.5 wks post op  Post-Surgical Precautions: Right hip posterolateral precautions; 4/19/24 L TSR, anatomical (not reverse), GERD, aortic aneurysm       Subjective    General Comment: Soreness R thigh and L knee. Some fatigue by end of day, jaun in thigh with standing. Overall pleased with progress, feeling confident with ability to cont progress with HEP.      Pre-Treatment Symptoms:   Pain Assessment: 0-10  0-10 (Numeric) Pain Score: 2    Objective      Findings:   Very Mild gait asymmetry, no assistive device  Other Measures  Other Outcome Measures: 2-MWT 162m (was 92m at eval, age/gender/BMI norm 170m)    Incision healing, no signs of infection, no gross adhesions, no remaining visible suture remnants  Non-uniform mm R gluteals, guarding  1/23/25 remnant of nylon suture further emerged proximally, removed easily. 2 dissolvable sutures emerging proximal   1/16/25 2min walk test 480 feet (146 meter)  1/3/25 of incision but not coming loose without effort    Treatments:    Therapeutic Exercise 21253:   Resisted gait lat 10#, FW/BW 15# x4 ea  Shuttle B 75# 2x15, single 37# 3x15       Shuttle heel raises 37# 3x15  Tloop L hip add standing orange  Tloop iso hip abd mini squat green  Tloop monster walk FW/BW green      Deferred:  NuStep L6, intermittent cues to maintain neutral hip 8min  Hurdles for ROM FW/R/L 6inch  Ham curl 4# 3x15  Leg ext #5 2x15  Step up 3in  STS not deep ROM (raise table) 25 inches updated HEP  Wgt carry unilateral #10    HEP / Access Codes:   1/7/25 reviewed HEP  2/6/25:   Tloop L hip add standing orange  Tloop iso hip abd mini squat green  Tloop monster walk FW/BW green    Assessment:   PT Assessment  Assessment Comment: Continued gradual progress. Further improvement expected with adherence to HEP. Pt to contact dept with any needs.  Date of discharge 2/6/2025  Date of last visit 2/6/2025  Date of evaluation 1/2/25  Number of attended visits 10  Referred by Aleshia Medina PA-C  Referred for R THR    Discharge Reason(s): Achieved all or the most significant goal(s) and Satisfied with progress, able to continue self-management independently      Post-Treatment Symptoms:   Response to Interventions: No change in pain    Plan:    D/C PT    Goals:   Resolved       PT Problem       PT Goals (Met)       Start:  01/02/25    Expected End:  04/02/25    Resolved:  02/06/25    MET 1) Indep with HEP  MET 2) Increase 2-MWT to at least 145m from 92m at eval to indicate improved mobility/strength/endurance (age and BMI related norm 170m).  MET 3) Symmetrical gait without cane.           Patient Stated Goals (Met)       Start:  01/02/25    Expected End:  04/02/25    Resolved:  02/06/25    1) Pain free R hip  2) Good balance  3) Good ROM

## 2025-02-20 ENCOUNTER — APPOINTMENT (OUTPATIENT)
Dept: PRIMARY CARE | Facility: CLINIC | Age: 60
End: 2025-02-20
Payer: COMMERCIAL

## 2025-02-28 LAB
ALBUMIN SERPL-MCNC: 4.4 G/DL (ref 3.6–5.1)
ALBUMIN/CREAT UR: 9 MG/G CREAT
ALP SERPL-CCNC: 76 U/L (ref 37–153)
ALT SERPL-CCNC: 17 U/L (ref 6–29)
ANION GAP SERPL CALCULATED.4IONS-SCNC: 9 MMOL/L (CALC) (ref 7–17)
APPEARANCE UR: CLEAR
AST SERPL-CCNC: 22 U/L (ref 10–35)
BACTERIA #/AREA URNS HPF: ABNORMAL /HPF
BASOPHILS # BLD AUTO: 59 CELLS/UL (ref 0–200)
BASOPHILS NFR BLD AUTO: 1.3 %
BILIRUB SERPL-MCNC: 0.4 MG/DL (ref 0.2–1.2)
BILIRUB UR QL STRIP: NEGATIVE
BUN SERPL-MCNC: 14 MG/DL (ref 7–25)
CALCIUM SERPL-MCNC: 9.3 MG/DL (ref 8.6–10.4)
CAOX CRY #/AREA URNS HPF: ABNORMAL /HPF
CHLORIDE SERPL-SCNC: 103 MMOL/L (ref 98–110)
CHOLEST SERPL-MCNC: 232 MG/DL
CHOLEST/HDLC SERPL: 4.5 (CALC)
CO2 SERPL-SCNC: 27 MMOL/L (ref 20–32)
COLOR UR: ABNORMAL
CREAT SERPL-MCNC: 0.66 MG/DL (ref 0.5–1.03)
CREAT UR-MCNC: 383 MG/DL (ref 20–275)
EGFRCR SERPLBLD CKD-EPI 2021: 102 ML/MIN/1.73M2
EOSINOPHIL # BLD AUTO: 81 CELLS/UL (ref 15–500)
EOSINOPHIL NFR BLD AUTO: 1.8 %
ERYTHROCYTE [DISTWIDTH] IN BLOOD BY AUTOMATED COUNT: 14.8 % (ref 11–15)
GLUCOSE SERPL-MCNC: 95 MG/DL (ref 65–99)
GLUCOSE UR QL STRIP: NEGATIVE
HCT VFR BLD AUTO: 34.2 % (ref 35–45)
HDLC SERPL-MCNC: 52 MG/DL
HGB BLD-MCNC: 10.9 G/DL (ref 11.7–15.5)
HGB UR QL STRIP: ABNORMAL
HYALINE CASTS #/AREA URNS LPF: ABNORMAL /LPF
KETONES UR QL STRIP: ABNORMAL
LDLC SERPL CALC-MCNC: 147 MG/DL (CALC)
LEUKOCYTE ESTERASE UR QL STRIP: ABNORMAL
LYMPHOCYTES # BLD AUTO: 1760 CELLS/UL (ref 850–3900)
LYMPHOCYTES NFR BLD AUTO: 39.1 %
MCH RBC QN AUTO: 27.5 PG (ref 27–33)
MCHC RBC AUTO-ENTMCNC: 31.9 G/DL (ref 32–36)
MCV RBC AUTO: 86.1 FL (ref 80–100)
MICROALBUMIN UR-MCNC: 3.4 MG/DL
MONOCYTES # BLD AUTO: 342 CELLS/UL (ref 200–950)
MONOCYTES NFR BLD AUTO: 7.6 %
NEUTROPHILS # BLD AUTO: 2259 CELLS/UL (ref 1500–7800)
NEUTROPHILS NFR BLD AUTO: 50.2 %
NITRITE UR QL STRIP: NEGATIVE
NONHDLC SERPL-MCNC: 180 MG/DL (CALC)
PH UR STRIP: 7 [PH] (ref 5–8)
PLATELET # BLD AUTO: 408 THOUSAND/UL (ref 140–400)
PMV BLD REES-ECKER: 9.6 FL (ref 7.5–12.5)
POTASSIUM SERPL-SCNC: 3.9 MMOL/L (ref 3.5–5.3)
PROT SERPL-MCNC: 6.9 G/DL (ref 6.1–8.1)
PROT UR QL STRIP: ABNORMAL
RBC # BLD AUTO: 3.97 MILLION/UL (ref 3.8–5.1)
RBC #/AREA URNS HPF: ABNORMAL /HPF
SERVICE CMNT-IMP: ABNORMAL
SODIUM SERPL-SCNC: 139 MMOL/L (ref 135–146)
SP GR UR STRIP: 1.02 (ref 1–1.03)
SQUAMOUS #/AREA URNS HPF: ABNORMAL /HPF
TRANS CELLS #/AREA URNS HPF: ABNORMAL /HPF
TRIGL SERPL-MCNC: 192 MG/DL
WBC # BLD AUTO: 4.5 THOUSAND/UL (ref 3.8–10.8)
WBC #/AREA URNS HPF: ABNORMAL /HPF

## 2025-03-03 ENCOUNTER — TELEPHONE (OUTPATIENT)
Dept: PRIMARY CARE | Facility: CLINIC | Age: 60
End: 2025-03-03

## 2025-03-03 ENCOUNTER — APPOINTMENT (OUTPATIENT)
Dept: PRIMARY CARE | Facility: CLINIC | Age: 60
End: 2025-03-03
Payer: COMMERCIAL

## 2025-03-03 VITALS
HEART RATE: 84 BPM | SYSTOLIC BLOOD PRESSURE: 122 MMHG | WEIGHT: 152 LBS | DIASTOLIC BLOOD PRESSURE: 66 MMHG | OXYGEN SATURATION: 98 % | BODY MASS INDEX: 26.09 KG/M2 | RESPIRATION RATE: 16 BRPM

## 2025-03-03 DIAGNOSIS — I10 ESSENTIAL (PRIMARY) HYPERTENSION: Primary | ICD-10-CM

## 2025-03-03 DIAGNOSIS — E78.00 PURE HYPERCHOLESTEROLEMIA: ICD-10-CM

## 2025-03-03 DIAGNOSIS — I10 ESSENTIAL (PRIMARY) HYPERTENSION: ICD-10-CM

## 2025-03-03 PROBLEM — I71.9 AORTIC ANEURYSM (CMS-HCC): Status: RESOLVED | Noted: 2023-05-01 | Resolved: 2025-03-03

## 2025-03-03 PROCEDURE — 99213 OFFICE O/P EST LOW 20 MIN: CPT | Performed by: FAMILY MEDICINE

## 2025-03-03 PROCEDURE — 1036F TOBACCO NON-USER: CPT | Performed by: FAMILY MEDICINE

## 2025-03-03 PROCEDURE — 3074F SYST BP LT 130 MM HG: CPT | Performed by: FAMILY MEDICINE

## 2025-03-03 PROCEDURE — 3078F DIAST BP <80 MM HG: CPT | Performed by: FAMILY MEDICINE

## 2025-03-03 ASSESSMENT — PAIN SCALES - GENERAL: PAINLEVEL_OUTOF10: 0-NO PAIN

## 2025-03-03 NOTE — ASSESSMENT & PLAN NOTE
Will continue the amlodipine since her dizziness has resolved and her blood pressure is in good control.  In retrospect and talking to her about her dizziness symptoms perhaps they related more to labyrinthitis rather than side effect from medication.  If she has recurrence of symptoms she is to let me know and we can discuss this further.  She is to follow-up in 6 months for recheck.

## 2025-03-03 NOTE — PROGRESS NOTES
Subjective   Patient ID: Tiki Gunter is a 59 y.o. female who presents for Hypertension (Patient is here for a HTN check ).    HPI   She is here for follow-up of hypertension.  She is on amlodipine 5 daily.  When she came in for CPE in 12/24 we talked about switching that medication because it was making her feel slightly dizzy.  We held off because of her upcoming hip surgery. Since then her dizziness SX have resolved.  She had been on losartan prior to amlodipine which was causing fatigue.    Review of Systems  Denies headache, blurred vision, chest pain, shortness of breath, nausea or vomiting, change in bowel habits or leg pain or swelling.    Objective   /66 (BP Location: Left arm, Patient Position: Sitting, BP Cuff Size: Large adult)   Pulse 84   Resp 16   Wt 68.9 kg (152 lb)   LMP  (LMP Unknown) Comment: uterine ablasion, over 55  SpO2 98%   BMI 26.09 kg/m²     Physical Exam  Vitals and nurse's notes reviewed  General: no acute distress  HEENT: Normal  Neck: Supple  Lungs: Clear  Cardio: RRR w/o murmur  Extremities: No edema, no calf tenderness  Neuro: Alert and oriented with no focal deficits    Assessment/Plan   Problem List Items Addressed This Visit             ICD-10-CM       High    Essential (primary) hypertension - Primary I10     Will continue the amlodipine since her dizziness has resolved and her blood pressure is in good control.  In retrospect and talking to her about her dizziness symptoms perhaps they related more to labyrinthitis rather than side effect from medication.  If she has recurrence of symptoms she is to let me know and we can discuss this further.  She is to follow-up in 6 months for recheck.

## 2025-05-29 DIAGNOSIS — I10 ESSENTIAL (PRIMARY) HYPERTENSION: ICD-10-CM

## 2025-05-29 RX ORDER — AMLODIPINE BESYLATE 5 MG/1
5 TABLET ORAL DAILY
Qty: 90 TABLET | Refills: 2 | Status: SHIPPED | OUTPATIENT
Start: 2025-05-29

## 2025-06-25 ASSESSMENT — ENCOUNTER SYMPTOMS
CHEST TIGHTNESS: 0
WEAKNESS: 0
DIFFICULTY URINATING: 0
SHORTNESS OF BREATH: 0
FATIGUE: 0
ABDOMINAL PAIN: 0
DIZZINESS: 0
ABDOMINAL DISTENTION: 0
DYSURIA: 0
UNEXPECTED WEIGHT CHANGE: 0
ACTIVITY CHANGE: 0
ADENOPATHY: 0
COLOR CHANGE: 0
JOINT SWELLING: 0
HEADACHES: 0

## 2025-06-25 NOTE — PROGRESS NOTES
"Annual-menopause  Subjective   Tiki Gunter is a 59 y.o.  female last seen2022,  who is here for a routine exam.   Complaints:   denies vag bleed or discharge; denies pelvic  pain, pressure, or persistent bloating.  Medical History[1] abnl pap [id number]/colpo ;former tobacco  SurgHx:EAB ;  brst aug 2006  2010 h/s removal iud/resect submuc fibroid.  Bilat hip replaced; left shoulder replaced  Cold sores; htn; cholesterol; acne  Fam Hx: fa MI; Mo htn  Last pap 8/10/2021 neg/hpv neg  Last mamm 12/10/2024 neg/saline implants  Colonoscopy  2019 normal; Dr. Anna Poon; repeat due   Review of Systems   Constitutional:  Negative for activity change, fatigue and unexpected weight change.   Respiratory:  Negative for chest tightness and shortness of breath.    Cardiovascular:  Negative for chest pain and leg swelling.   Gastrointestinal:  Negative for abdominal distention and abdominal pain.   Genitourinary:  Negative for difficulty urinating, dysuria, genital sores, pelvic pain, vaginal bleeding, vaginal discharge and vaginal pain.   Musculoskeletal:  Negative for gait problem and joint swelling.   Skin:  Negative for color change and rash.   Neurological:  Negative for dizziness, weakness and headaches.   Hematological:  Negative for adenopathy.   Objective   Visit Vitals  /80   Ht 1.626 m (5' 4\")   Wt 69.7 kg (153 lb 9.6 oz)   LMP  (LMP Unknown) Comment: uterine ablasion, over 55   BMI 26.37 kg/m²   OB Status Postmenopausal   Smoking Status Former   BSA 1.77 m²       General:   Alert and oriented, in no acute distress   Neck: Supple. No visible thyromegaly.    Breast/Axilla: Normal to palpation bilaterally without masses, skin changes, or nipple discharge.    Abdomen: Soft, non-tender, without masses or organomegaly   Vulva: Normal architecture without erythema, masses, or lesions.    Vagina: mucosa without lesions, masses.  Positive atrophy. No abnormal vaginal discharge.    Cervix: Normal " without masses, lesions, or signs of cervicitis.  Pap sent   Uterus: Normal mobile, non-enlarged uterus    Adnexa: No palpable  masses or tenderness   Pelvic Floor No POP noted. No high tone pelvic floor    Psych   Normal affect. Normal mood.      Assessment/Plan   Encounter Diagnoses   Name Primary?    Visit for gynecologic examination; no suspicious findings on current clinical breast or pelvic exam. Yes    Screening for human papillomavirus; Pap sent     Encounter for screening mammogram for malignant neoplasm of breast; due December 2025/order placed.     Menopause;; rare symptoms now     Postmenopausal atrophic vaginitis; asymptomatic/abstinent     Postmenopausal bone loss; preventive strategies reviewed     Screen for colon cancer; scope nl; repeat due 2029     Telma Singh MD           [1]   Past Medical History:  Diagnosis Date    Aortic aneurysm 05/01/2023    Arthritis     Ascending aorta dilatation     Delayed emergence from general anesthesia     Eczema     GERD (gastroesophageal reflux disease)     Hyperlipidemia     Hypertension

## 2025-06-26 ENCOUNTER — OFFICE VISIT (OUTPATIENT)
Dept: OBSTETRICS AND GYNECOLOGY | Facility: CLINIC | Age: 60
End: 2025-06-26
Payer: COMMERCIAL

## 2025-06-26 VITALS
DIASTOLIC BLOOD PRESSURE: 80 MMHG | WEIGHT: 153.6 LBS | SYSTOLIC BLOOD PRESSURE: 120 MMHG | BODY MASS INDEX: 26.22 KG/M2 | HEIGHT: 64 IN

## 2025-06-26 DIAGNOSIS — Z11.51 SCREENING FOR HUMAN PAPILLOMAVIRUS: ICD-10-CM

## 2025-06-26 DIAGNOSIS — M81.0 POSTMENOPAUSAL BONE LOSS: ICD-10-CM

## 2025-06-26 DIAGNOSIS — N95.2 POSTMENOPAUSAL ATROPHIC VAGINITIS: ICD-10-CM

## 2025-06-26 DIAGNOSIS — Z12.11 SCREEN FOR COLON CANCER: ICD-10-CM

## 2025-06-26 DIAGNOSIS — Z78.0 MENOPAUSE: ICD-10-CM

## 2025-06-26 DIAGNOSIS — Z12.31 ENCOUNTER FOR SCREENING MAMMOGRAM FOR MALIGNANT NEOPLASM OF BREAST: ICD-10-CM

## 2025-06-26 DIAGNOSIS — Z01.419 VISIT FOR GYNECOLOGIC EXAMINATION: Primary | ICD-10-CM

## 2025-06-26 PROCEDURE — 99396 PREV VISIT EST AGE 40-64: CPT | Performed by: OBSTETRICS & GYNECOLOGY

## 2025-06-26 PROCEDURE — 3008F BODY MASS INDEX DOCD: CPT | Performed by: OBSTETRICS & GYNECOLOGY

## 2025-06-26 PROCEDURE — 1036F TOBACCO NON-USER: CPT | Performed by: OBSTETRICS & GYNECOLOGY

## 2025-06-26 PROCEDURE — 3079F DIAST BP 80-89 MM HG: CPT | Performed by: OBSTETRICS & GYNECOLOGY

## 2025-06-26 PROCEDURE — 3074F SYST BP LT 130 MM HG: CPT | Performed by: OBSTETRICS & GYNECOLOGY

## 2025-06-26 ASSESSMENT — ENCOUNTER SYMPTOMS
LOSS OF SENSATION IN FEET: 0
OCCASIONAL FEELINGS OF UNSTEADINESS: 0
DEPRESSION: 0

## 2025-06-26 ASSESSMENT — LIFESTYLE VARIABLES
AUDIT-C TOTAL SCORE: 3
HOW MANY STANDARD DRINKS CONTAINING ALCOHOL DO YOU HAVE ON A TYPICAL DAY: 1 OR 2
HOW OFTEN DO YOU HAVE A DRINK CONTAINING ALCOHOL: 2-3 TIMES A WEEK
SKIP TO QUESTIONS 9-10: 1
HOW OFTEN DO YOU HAVE SIX OR MORE DRINKS ON ONE OCCASION: NEVER

## 2025-06-26 ASSESSMENT — PATIENT HEALTH QUESTIONNAIRE - PHQ9
SUM OF ALL RESPONSES TO PHQ9 QUESTIONS 1 & 2: 0
2. FEELING DOWN, DEPRESSED OR HOPELESS: NOT AT ALL
1. LITTLE INTEREST OR PLEASURE IN DOING THINGS: NOT AT ALL

## 2025-06-26 ASSESSMENT — PAIN SCALES - GENERAL: PAINLEVEL_OUTOF10: 0-NO PAIN

## 2025-08-08 DIAGNOSIS — I10 ESSENTIAL (PRIMARY) HYPERTENSION: ICD-10-CM

## 2025-08-08 DIAGNOSIS — E78.00 PURE HYPERCHOLESTEROLEMIA: ICD-10-CM

## 2025-09-03 ENCOUNTER — TELEPHONE (OUTPATIENT)
Dept: OBSTETRICS AND GYNECOLOGY | Facility: CLINIC | Age: 60
End: 2025-09-03
Payer: COMMERCIAL

## 2025-09-03 DIAGNOSIS — B00.1 RECURRENT COLD SORES: Primary | ICD-10-CM

## 2025-09-03 RX ORDER — VALACYCLOVIR HYDROCHLORIDE 1 G/1
1000 TABLET, FILM COATED ORAL 2 TIMES DAILY
Qty: 4 TABLET | Refills: 1 | Status: SHIPPED | OUTPATIENT
Start: 2025-09-03 | End: 2025-09-05

## 2025-09-08 ENCOUNTER — APPOINTMENT (OUTPATIENT)
Dept: PRIMARY CARE | Facility: CLINIC | Age: 60
End: 2025-09-08
Payer: COMMERCIAL

## 2025-09-18 ENCOUNTER — APPOINTMENT (OUTPATIENT)
Dept: PRIMARY CARE | Facility: CLINIC | Age: 60
End: 2025-09-18
Payer: COMMERCIAL

## (undated) DEVICE — STOCKINETTE, IMPERVIOUS, 12 X 48 IN, LF, STERILE

## (undated) DEVICE — GLOVE, SURGICAL, PROTEXIS PI BLUE W/NEUTHERA, 7.0, PF, LF

## (undated) DEVICE — NEEDLE, SPINAL, QUINCKE, 18 G X 3.5 IN, PINK HUB

## (undated) DEVICE — GLOVE, SURGICAL, PROTEXIS PI , 6.5, PF, LF

## (undated) DEVICE — GOWN, SURGICAL, SIRUS, NON REINFORCED, LARGE

## (undated) DEVICE — SLEEVE, VASO PRESS, CALF GARMENT, MEDIUM, GREEN

## (undated) DEVICE — SUTURE, VICRYL, 3-0, 27 IN, CT-1, UNDYED

## (undated) DEVICE — SUTURE, ETHIBOND XTRA, 5 V-37, GRN/BR, LF

## (undated) DEVICE — BLADE, SAW, SAGITTAL, 25 X 73 X 1.24MM, SS, STERILE

## (undated) DEVICE — Device

## (undated) DEVICE — HOOD, SURGICAL, FLYTE HYBRID

## (undated) DEVICE — 10IN STRYKER SMOKE EVACUATION TUBING

## (undated) DEVICE — DRAPE, SHEET, LARGE, 70 X 85IN, STERILE

## (undated) DEVICE — GLOVE, SURGICAL, PROTEXIS PI , 8.0, PF, LF

## (undated) DEVICE — WOUND SYSTEM, DEBRIDEMENT & CLEANING, O.R DUOPAK

## (undated) DEVICE — DRAPE, IRRIGATION, W/POUCH, ADHESIVE STRIP, STERI DRAPE, 19 X 23 IN, DISPOSABLE, STERILE

## (undated) DEVICE — ADULT REM POLYHESIVE II PATIENT RETURN ELECTRODE W/9 FT (2.7 M) ATTACHED CORD

## (undated) DEVICE — DRAPE, ISOLATION, INCISE, W/POUCH, STERI DRAPE, 125 X 83 IN, DISPOSABLE, STERILE

## (undated) DEVICE — SYRINGE, 60 CC, IRRIGATION, BULB, CONTRO-BULB, PAPER POUCH

## (undated) DEVICE — SUTURE, VICRYL, 0, 18 IN, CT-1, UNDYED

## (undated) DEVICE — SUTURE, VICRYL, 0, 36 IN, CT-1, UNDYED

## (undated) DEVICE — SUTURE, FIBERLINK P 2, 26IN BRAIDED POLYBLEND, BLUE

## (undated) DEVICE — SUTURE, MONOCRYL, 4-0, 27 IN, PS-2, UNDYED

## (undated) DEVICE — ADHESIVE, SKIN, DERMABOND ADVANCED, 15CM, PEN-STYLE

## (undated) DEVICE — SUTURE, VICRYL, 2-0, 18 IN CP-2, UNDYED

## (undated) DEVICE — DRAPE, SHEET, U, W/ADHESIVE STRIP, IMPERVIOUS, 60 X 70 IN, DISPOSABLE, LF, STERILE

## (undated) DEVICE — MIXER, CEMENT, MIXEVAC III HIGH VACUUM KIT, STERILE

## (undated) DEVICE — SUTURE, FIBERWIRE 2, T-5 TAPER NEEDLE, 38"

## (undated) DEVICE — PACK, BEACH CHAIR SHOULDER

## (undated) DEVICE — BLADE, SAW, SAGITTAL, 18.5 X 73 X 0.76 MM, STAINLESS STEEL, STERILE

## (undated) DEVICE — GLOVE, SURGICAL, PROTEXIS PI , 7.5, PF, LF

## (undated) DEVICE — STRIP, SKIN CLOSURE, STERI STRIP, REINFORCED, 0.5 X 4 IN

## (undated) DEVICE — NEEDLE, SCORPION, HD

## (undated) DEVICE — DRAPE, INCISE, ANTIMICROBIAL, IOBAN 2, 13 X 13 IN, DISPOSABLE, STERILE

## (undated) DEVICE — DRAPE, INCISE, STERI DRAPE, LARGE, 23 X 17 IN, DISPOSABLE, STERILE

## (undated) DEVICE — TUBING, IRRIGATION, HIGH FLOW, HAND PIECE SET

## (undated) DEVICE — 83.75IN X 126IN STERI-DRAPE, ISOLATION POUCH W/DRAIN, ADH WINDOW

## (undated) DEVICE — SYRINGE, 50 CC, LUER LOCK

## (undated) DEVICE — SCREW SIZER, ECLIPSE CAGE

## (undated) DEVICE — BANDAGE, COBAN 2, LAYER LITE COMPRESSION, 4 IN, LF

## (undated) DEVICE — SKIN CLOSURE SYS, PREMIERPRO EXOFIN, 2-4CM X 30CM, 1.75G TUBE

## (undated) DEVICE — DRESSING, SILVERLON FLEXIBLE ISLAND, 4 X 11IN

## (undated) DEVICE — RETRIEVER, SUTURE, STERILE

## (undated) DEVICE — BLANKET, LOWER BODY, VHA PLUS, ADULT